# Patient Record
Sex: FEMALE | Race: WHITE | NOT HISPANIC OR LATINO | Employment: UNEMPLOYED | URBAN - METROPOLITAN AREA
[De-identification: names, ages, dates, MRNs, and addresses within clinical notes are randomized per-mention and may not be internally consistent; named-entity substitution may affect disease eponyms.]

---

## 2020-09-03 ENCOUNTER — LAB VISIT (OUTPATIENT)
Dept: LAB | Facility: HOSPITAL | Age: 30
End: 2020-09-03
Attending: FAMILY MEDICINE
Payer: OTHER GOVERNMENT

## 2020-09-03 ENCOUNTER — OFFICE VISIT (OUTPATIENT)
Dept: FAMILY MEDICINE | Facility: CLINIC | Age: 30
End: 2020-09-03
Payer: OTHER GOVERNMENT

## 2020-09-03 VITALS
DIASTOLIC BLOOD PRESSURE: 83 MMHG | BODY MASS INDEX: 35.37 KG/M2 | TEMPERATURE: 98 F | HEART RATE: 93 BPM | SYSTOLIC BLOOD PRESSURE: 112 MMHG | OXYGEN SATURATION: 97 % | RESPIRATION RATE: 15 BRPM | WEIGHT: 192.19 LBS | HEIGHT: 62 IN

## 2020-09-03 DIAGNOSIS — Z11.59 ENCOUNTER FOR HEPATITIS C SCREENING TEST FOR LOW RISK PATIENT: ICD-10-CM

## 2020-09-03 DIAGNOSIS — E03.9 HYPOTHYROIDISM, UNSPECIFIED TYPE: ICD-10-CM

## 2020-09-03 DIAGNOSIS — Z76.89 ENCOUNTER TO ESTABLISH CARE: Primary | ICD-10-CM

## 2020-09-03 DIAGNOSIS — F41.9 ANXIETY: ICD-10-CM

## 2020-09-03 DIAGNOSIS — R10.33 PERIUMBILICAL ABDOMINAL PAIN: ICD-10-CM

## 2020-09-03 DIAGNOSIS — Z13.1 SCREENING FOR DIABETES MELLITUS: ICD-10-CM

## 2020-09-03 DIAGNOSIS — Z13.220 SCREENING FOR LIPID DISORDERS: ICD-10-CM

## 2020-09-03 DIAGNOSIS — Z86.73 HISTORY OF STROKE: ICD-10-CM

## 2020-09-03 DIAGNOSIS — Z11.4 SCREENING FOR HIV (HUMAN IMMUNODEFICIENCY VIRUS): ICD-10-CM

## 2020-09-03 LAB
ALBUMIN SERPL BCP-MCNC: 4 G/DL (ref 3.5–5.2)
ALP SERPL-CCNC: 77 U/L (ref 55–135)
ALT SERPL W/O P-5'-P-CCNC: 29 U/L (ref 10–44)
ANION GAP SERPL CALC-SCNC: 11 MMOL/L (ref 8–16)
AST SERPL-CCNC: 26 U/L (ref 10–40)
BASOPHILS # BLD AUTO: 0.04 K/UL (ref 0–0.2)
BASOPHILS NFR BLD: 0.6 % (ref 0–1.9)
BILIRUB SERPL-MCNC: 0.7 MG/DL (ref 0.1–1)
BUN SERPL-MCNC: 11 MG/DL (ref 6–20)
CALCIUM SERPL-MCNC: 9.4 MG/DL (ref 8.7–10.5)
CHLORIDE SERPL-SCNC: 104 MMOL/L (ref 95–110)
CHOLEST SERPL-MCNC: 185 MG/DL (ref 120–199)
CHOLEST/HDLC SERPL: 3.1 {RATIO} (ref 2–5)
CO2 SERPL-SCNC: 25 MMOL/L (ref 23–29)
CREAT SERPL-MCNC: 0.9 MG/DL (ref 0.5–1.4)
DIFFERENTIAL METHOD: ABNORMAL
EOSINOPHIL # BLD AUTO: 0.2 K/UL (ref 0–0.5)
EOSINOPHIL NFR BLD: 3.2 % (ref 0–8)
ERYTHROCYTE [DISTWIDTH] IN BLOOD BY AUTOMATED COUNT: 14.7 % (ref 11.5–14.5)
EST. GFR  (AFRICAN AMERICAN): >60 ML/MIN/1.73 M^2
EST. GFR  (NON AFRICAN AMERICAN): >60 ML/MIN/1.73 M^2
ESTIMATED AVG GLUCOSE: 94 MG/DL (ref 68–131)
GLUCOSE SERPL-MCNC: 71 MG/DL (ref 70–110)
HBA1C MFR BLD HPLC: 4.9 % (ref 4.5–6.2)
HCT VFR BLD AUTO: 43.1 % (ref 37–48.5)
HDLC SERPL-MCNC: 59 MG/DL (ref 40–75)
HDLC SERPL: 31.9 % (ref 20–50)
HGB BLD-MCNC: 13.9 G/DL (ref 12–16)
IMM GRANULOCYTES # BLD AUTO: 0.02 K/UL (ref 0–0.04)
IMM GRANULOCYTES NFR BLD AUTO: 0.3 % (ref 0–0.5)
LDLC SERPL CALC-MCNC: 103.2 MG/DL (ref 63–159)
LYMPHOCYTES # BLD AUTO: 2.2 K/UL (ref 1–4.8)
LYMPHOCYTES NFR BLD: 32.7 % (ref 18–48)
MCH RBC QN AUTO: 27.4 PG (ref 27–31)
MCHC RBC AUTO-ENTMCNC: 32.3 G/DL (ref 32–36)
MCV RBC AUTO: 85 FL (ref 82–98)
MONOCYTES # BLD AUTO: 0.6 K/UL (ref 0.3–1)
MONOCYTES NFR BLD: 8.3 % (ref 4–15)
NEUTROPHILS # BLD AUTO: 3.6 K/UL (ref 1.8–7.7)
NEUTROPHILS NFR BLD: 54.9 % (ref 38–73)
NONHDLC SERPL-MCNC: 126 MG/DL
NRBC BLD-RTO: 0 /100 WBC
PLATELET # BLD AUTO: 197 K/UL (ref 150–350)
PMV BLD AUTO: 12.1 FL (ref 9.2–12.9)
POTASSIUM SERPL-SCNC: 3.5 MMOL/L (ref 3.5–5.1)
PROT SERPL-MCNC: 8.1 G/DL (ref 6–8.4)
RBC # BLD AUTO: 5.07 M/UL (ref 4–5.4)
SODIUM SERPL-SCNC: 140 MMOL/L (ref 136–145)
T4 FREE SERPL-MCNC: 0.71 NG/DL (ref 0.71–1.51)
TRIGL SERPL-MCNC: 114 MG/DL (ref 30–150)
TSH SERPL DL<=0.005 MIU/L-ACNC: 7.18 UIU/ML (ref 0.34–5.6)
WBC # BLD AUTO: 6.6 K/UL (ref 3.9–12.7)

## 2020-09-03 PROCEDURE — 86703 HIV-1/HIV-2 1 RESULT ANTBDY: CPT

## 2020-09-03 PROCEDURE — 99204 OFFICE O/P NEW MOD 45 MIN: CPT | Mod: S$GLB,,, | Performed by: FAMILY MEDICINE

## 2020-09-03 PROCEDURE — 80053 COMPREHEN METABOLIC PANEL: CPT

## 2020-09-03 PROCEDURE — 86803 HEPATITIS C AB TEST: CPT

## 2020-09-03 PROCEDURE — 84443 ASSAY THYROID STIM HORMONE: CPT

## 2020-09-03 PROCEDURE — 84439 ASSAY OF FREE THYROXINE: CPT

## 2020-09-03 PROCEDURE — 85025 COMPLETE CBC W/AUTO DIFF WBC: CPT

## 2020-09-03 PROCEDURE — 99204 PR OFFICE/OUTPT VISIT, NEW, LEVL IV, 45-59 MIN: ICD-10-PCS | Mod: S$GLB,,, | Performed by: FAMILY MEDICINE

## 2020-09-03 PROCEDURE — 80061 LIPID PANEL: CPT

## 2020-09-03 PROCEDURE — 83036 HEMOGLOBIN GLYCOSYLATED A1C: CPT

## 2020-09-03 PROCEDURE — 36415 COLL VENOUS BLD VENIPUNCTURE: CPT

## 2020-09-03 RX ORDER — CLONAZEPAM 1 MG/1
1 TABLET ORAL 2 TIMES DAILY PRN
COMMUNITY
End: 2020-09-03 | Stop reason: SDUPTHER

## 2020-09-03 RX ORDER — OMEPRAZOLE 40 MG/1
40 CAPSULE, DELAYED RELEASE ORAL DAILY
COMMUNITY
End: 2020-10-08

## 2020-09-03 RX ORDER — THYROID 30 MG/1
30 TABLET ORAL
COMMUNITY
End: 2020-09-08 | Stop reason: SDUPTHER

## 2020-09-03 RX ORDER — CLONAZEPAM 1 MG/1
1 TABLET ORAL 2 TIMES DAILY PRN
Qty: 60 TABLET | Refills: 0 | Status: SHIPPED | OUTPATIENT
Start: 2020-09-03 | End: 2020-10-08 | Stop reason: SDUPTHER

## 2020-09-03 NOTE — PROGRESS NOTES
[facility] - Clinic Note    Subjective      Ms. Gamble is a 30 y.o. female who presents to clinic for Establish Care (REQUESTING REFILLS)    Patient recently moved here from [facility].  She has a history of GERD, hypothyroidism, stroke.  Reports that she has had several strokes in her life.  The last stroke was in February of 2018.  There has been no reason elucidated for these strokes.  She is currently on Eliquis which has been the only medication that has been helping prevent her strokes.  With regard to her hypothyroidism that was diagnosed a year ago she is currently taking Keene Thyroid 30 mg daily.  She also has a history of anxiety for which she takes Klonopin 1 mg daily.  She feels like she may need a 2nd does throughout the day given the move an extra anxiety at this time.  She takes Prilosec for GERD which helps.  For the last 2 days she has noticed some pain in her umbilicus.  Reports that 2 days ago her  noted some purulent drainage.  Reports a history of chronic diarrhea for at least the last year    [facility] Dori has a past medical history of Depression, GERD (gastroesophageal reflux disease), Hypothyroidism, and Stroke.   PSX Dori has a past surgical history that includes Hysterectomy.    Dori's family history includes Arthritis in her maternal grandmother; Depression in her mother and sister; Heart disease in her father and mother; Kidney disease in her sister; Stroke in her mother.   LUISA Meadows reports that she has been smoking cigarettes. She has never used smokeless tobacco. She reports current alcohol use of about 2.0 standard drinks of alcohol per week. She reports previous drug use.   DAI Meadows is allergic to latex, natural rubber.   MAYANK Meadows has a current medication list which includes the following prescription(s): apixaban, clonazepam, omeprazole, and thyroid (pork).     Review of Systems   Constitutional: Negative for chills and fever.   HENT: Negative for  "congestion and rhinorrhea.    Eyes: Negative for visual disturbance.   Respiratory: Negative for cough and shortness of breath.    Cardiovascular: Negative for chest pain.   Gastrointestinal: Positive for abdominal pain and diarrhea. Negative for constipation, nausea and vomiting.   Genitourinary: Negative for dysuria.   Musculoskeletal: Negative for myalgias.   Skin: Negative for rash.   Neurological: Negative for weakness and headaches.     Objective     /83 (BP Location: Left arm, Patient Position: Sitting, BP Method: Large (Automatic))   Pulse 93   Temp 98.4 °F (36.9 °C) (Temporal)   Resp 15   Ht 5' 2" (1.575 m)   Wt 87.2 kg (192 lb 3.2 oz)   SpO2 97%   BMI 35.15 kg/m²     Physical Exam  Vitals signs and nursing note reviewed.   Constitutional:       General: She is not in acute distress.     Appearance: Normal appearance. She is well-developed. She is not diaphoretic.   HENT:      Head: Normocephalic and atraumatic.      Right Ear: External ear normal.      Left Ear: External ear normal.   Eyes:      General:         Right eye: No discharge.         Left eye: No discharge.   Cardiovascular:      Rate and Rhythm: Normal rate and regular rhythm.      Heart sounds: Normal heart sounds.   Pulmonary:      Effort: Pulmonary effort is normal.      Breath sounds: Normal breath sounds. No wheezing or rales.   Abdominal:      General: Abdomen is flat.      Palpations: Abdomen is soft.      Tenderness: There is no abdominal tenderness.      Comments: No drainage in the umbilicus.  Small abrasion, no surrounding erythema or warmth   Skin:     General: Skin is warm and dry.   Neurological:      Mental Status: She is alert and oriented to person, place, and time. Mental status is at baseline.   Psychiatric:         Mood and Affect: Mood normal.         Behavior: Behavior normal.         Thought Content: Thought content normal.         Judgment: Judgment normal.        Assessment/Plan     1. Encounter to " establish care     2. History of stroke  apixaban (ELIQUIS) 5 mg Tab   3. Anxiety  clonazePAM (KLONOPIN) 1 MG tablet   4. Hypothyroidism, unspecified type  T4, free    TSH   5. Periumbilical abdominal pain  Comprehensive metabolic panel    CBC auto differential   6. Encounter for hepatitis C screening test for low risk patient  Hepatitis C Antibody   7. Screening for HIV (human immunodeficiency virus)  HIV 1 / 2 ANTIBODY   8. Screening for lipid disorders  Lipid Panel   9. Screening for diabetes mellitus  Hemoglobin A1C     Will continue Eliquis for history of stroke.  Will obtain records from previous physician for review.  Given persistent anxiety will increase Klonopin to 1 mg twice a day as needed.   reviewed.  No red flags.  He given history of hypothyroidism and diarrhea will check thyroid function to assess if hypothyroidism is adequately controlled.  Will also check CMP and CBC.  Screening lab work as above.  Will continue to monitor umbilical pain.  If any drainage is noted then call back.  Follow-up in 1 month.    Future Appointments   Date Time Provider Department Center   10/8/2020  1:40 PM Mc Bernal MD [facility]         Mc Bernal MD  Family Medicine  [facility]

## 2020-09-04 LAB
HCV AB SERPL QL IA: NEGATIVE
HIV 1+2 AB+HIV1 P24 AG SERPL QL IA: NEGATIVE

## 2020-09-08 DIAGNOSIS — E03.9 HYPOTHYROIDISM, UNSPECIFIED TYPE: Primary | ICD-10-CM

## 2020-09-23 ENCOUNTER — PATIENT MESSAGE (OUTPATIENT)
Dept: FAMILY MEDICINE | Facility: CLINIC | Age: 30
End: 2020-09-23

## 2020-09-24 ENCOUNTER — PATIENT OUTREACH (OUTPATIENT)
Dept: ADMINISTRATIVE | Facility: HOSPITAL | Age: 30
End: 2020-09-24

## 2020-09-25 ENCOUNTER — NURSE TRIAGE (OUTPATIENT)
Dept: ADMINISTRATIVE | Facility: CLINIC | Age: 30
End: 2020-09-25

## 2020-09-25 ENCOUNTER — TELEPHONE (OUTPATIENT)
Dept: FAMILY MEDICINE | Facility: CLINIC | Age: 30
End: 2020-09-25

## 2020-09-25 ENCOUNTER — HOSPITAL ENCOUNTER (EMERGENCY)
Facility: HOSPITAL | Age: 30
Discharge: HOME | End: 2020-09-25
Attending: EMERGENCY MEDICINE
Payer: OTHER GOVERNMENT

## 2020-09-25 VITALS
WEIGHT: 192 LBS | RESPIRATION RATE: 19 BRPM | HEART RATE: 82 BPM | BODY MASS INDEX: 35.33 KG/M2 | DIASTOLIC BLOOD PRESSURE: 78 MMHG | SYSTOLIC BLOOD PRESSURE: 116 MMHG | HEIGHT: 62 IN | TEMPERATURE: 98 F | OXYGEN SATURATION: 97 %

## 2020-09-25 DIAGNOSIS — R05.9 COUGH: ICD-10-CM

## 2020-09-25 DIAGNOSIS — R07.9 CHEST PAIN: ICD-10-CM

## 2020-09-25 DIAGNOSIS — J40 BRONCHITIS: Primary | ICD-10-CM

## 2020-09-25 LAB — SARS-COV-2 RDRP RESP QL NAA+PROBE: NEGATIVE

## 2020-09-25 PROCEDURE — 71046 X-RAY EXAM CHEST 2 VIEWS: CPT | Mod: 26,,, | Performed by: RADIOLOGY

## 2020-09-25 PROCEDURE — 71046 XR CHEST PA AND LATERAL: ICD-10-PCS | Mod: 26,,, | Performed by: RADIOLOGY

## 2020-09-25 PROCEDURE — U0002 COVID-19 LAB TEST NON-CDC: HCPCS

## 2020-09-25 PROCEDURE — 93005 ELECTROCARDIOGRAM TRACING: CPT

## 2020-09-25 PROCEDURE — 99284 EMERGENCY DEPT VISIT MOD MDM: CPT | Mod: 25

## 2020-09-25 PROCEDURE — 71046 X-RAY EXAM CHEST 2 VIEWS: CPT | Mod: TC,FY

## 2020-09-25 RX ORDER — METHYLPREDNISOLONE 4 MG/1
TABLET ORAL
Qty: 1 PACKAGE | Refills: 0 | Status: SHIPPED | OUTPATIENT
Start: 2020-09-25 | End: 2020-10-08

## 2020-09-25 RX ORDER — AZITHROMYCIN 250 MG/1
250 TABLET, FILM COATED ORAL DAILY
Qty: 6 TABLET | Refills: 0 | Status: SHIPPED | OUTPATIENT
Start: 2020-09-25 | End: 2020-10-08

## 2020-09-25 RX ORDER — BENZONATATE 100 MG/1
100 CAPSULE ORAL 3 TIMES DAILY PRN
Qty: 20 CAPSULE | Refills: 0 | Status: SHIPPED | OUTPATIENT
Start: 2020-09-25 | End: 2020-10-05

## 2020-09-25 RX ORDER — GUAIFENESIN 600 MG/1
600 TABLET, EXTENDED RELEASE ORAL 2 TIMES DAILY
Qty: 20 TABLET | Refills: 0 | Status: SHIPPED | OUTPATIENT
Start: 2020-09-25 | End: 2020-10-05

## 2020-09-25 NOTE — TELEPHONE ENCOUNTER
Attempted to contact patient, unable to reach X1      ----- Message from Gracia Buchanan sent at 9/25/2020 10:14 AM CDT -----  Regarding: Pt advice  Contact: py  Type:  Patient Returning Call    Who Called:  pt  Who Left Message for Patient:  Ирина Lamb  Does the patient know what this is regarding?:  pt is returning call to office.  Best Call Back Number:    Additional Information:  Please follow up. Thank you

## 2020-09-25 NOTE — TELEPHONE ENCOUNTER
Cough and cold for a week and very fatigue. Nasal congestion. Pt stated she doesn't want to be tested for covid 19 because she will get it. Tightness in chest. Care advice recommend pt go to Er. Pt verbalized understanding.     Reason for Disposition   SEVERE or constant chest pain or pressure (Exception: mild central chest pain, present only when coughing)    Additional Information   Negative: Severe difficulty breathing (e.g., struggling for each breath, speaks in single words)   Negative: Difficult to awaken or acting confused (e.g., disoriented, slurred speech)   Negative: Bluish (or gray) lips or face now   Negative: Shock suspected (e.g., cold/pale/clammy skin, too weak to stand, low BP, rapid pulse)   Negative: Sounds like a life-threatening emergency to the triager    Protocols used: CORONAVIRUS (COVID-19) DIAGNOSED OR [id number]
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
## 2020-10-08 ENCOUNTER — OFFICE VISIT (OUTPATIENT)
Dept: FAMILY MEDICINE | Facility: CLINIC | Age: 30
End: 2020-10-08

## 2020-10-08 VITALS
WEIGHT: 191.19 LBS | OXYGEN SATURATION: 96 % | HEIGHT: 62 IN | HEART RATE: 88 BPM | SYSTOLIC BLOOD PRESSURE: 111 MMHG | RESPIRATION RATE: 14 BRPM | DIASTOLIC BLOOD PRESSURE: 74 MMHG | BODY MASS INDEX: 35.18 KG/M2 | TEMPERATURE: 98 F

## 2020-10-08 DIAGNOSIS — Z86.73 HISTORY OF STROKE: ICD-10-CM

## 2020-10-08 DIAGNOSIS — F41.9 ANXIETY: ICD-10-CM

## 2020-10-08 DIAGNOSIS — K21.9 GASTROESOPHAGEAL REFLUX DISEASE, UNSPECIFIED WHETHER ESOPHAGITIS PRESENT: Primary | ICD-10-CM

## 2020-10-08 DIAGNOSIS — E03.9 HYPOTHYROIDISM, UNSPECIFIED TYPE: ICD-10-CM

## 2020-10-08 DIAGNOSIS — F17.210 CIGARETTE NICOTINE DEPENDENCE WITHOUT COMPLICATION: ICD-10-CM

## 2020-10-08 PROCEDURE — 99214 PR OFFICE/OUTPT VISIT, EST, LEVL IV, 30-39 MIN: ICD-10-PCS | Mod: S$GLB,,, | Performed by: FAMILY MEDICINE

## 2020-10-08 PROCEDURE — 99214 OFFICE O/P EST MOD 30 MIN: CPT | Mod: S$GLB,,, | Performed by: FAMILY MEDICINE

## 2020-10-08 RX ORDER — FAMOTIDINE 20 MG/1
20 TABLET, FILM COATED ORAL 2 TIMES DAILY PRN
Qty: 60 TABLET | Refills: 11 | Status: SHIPPED | OUTPATIENT
Start: 2020-10-08 | End: 2021-05-13

## 2020-10-08 RX ORDER — ATORVASTATIN CALCIUM 20 MG/1
20 TABLET, FILM COATED ORAL DAILY
Qty: 90 TABLET | Refills: 3 | Status: SHIPPED | OUTPATIENT
Start: 2020-10-08 | End: 2021-05-13 | Stop reason: SDUPTHER

## 2020-10-08 RX ORDER — CLONAZEPAM 1 MG/1
1 TABLET ORAL 2 TIMES DAILY PRN
Qty: 60 TABLET | Refills: 0 | Status: SHIPPED | OUTPATIENT
Start: 2020-10-08 | End: 2021-05-13

## 2020-10-08 NOTE — PROGRESS NOTES
"[facility] - Clinic Note    Subjective      Ms. Gamble is a 30 y.o. female who presents to clinic for Follow-up    Patient reports that she has been feeling well overall.  The Klonopin is helping control her anxiety.  With regard to acid reflux she was previously on omeprazole.  She has never tried Pepcid.  She has been on omeprazole for 5 years.  She currently smokes.  History of strokes on Eliquis.  With regard to her thyroid she states that she was not taking her medication as prescribed and she is taking as prescribed now.    PM Dori has a past medical history of Depression, GERD (gastroesophageal reflux disease), Hypothyroidism, and Stroke.   PSXH Dori has a past surgical history that includes Hysterectomy.    Dori's family history includes Arthritis in her maternal grandmother; Depression in her mother and sister; Heart disease in her father and mother; Kidney disease in her sister; Stroke in her mother.   LUISA Meadows reports that she has been smoking cigarettes. She has never used smokeless tobacco. She reports current alcohol use of about 2.0 standard drinks of alcohol per week. She reports previous drug use.   DAI Meadows is allergic to latex, natural rubber.   MAYANK Meadows has a current medication list which includes the following prescription(s): clonazepam, omeprazole, thyroid (pork), apixaban, atorvastatin, and famotidine.     Review of Systems   Constitutional: Negative for chills and fever.   Respiratory: Negative for shortness of breath.    Cardiovascular: Negative for chest pain.     Objective     /74 (BP Location: Left arm, Patient Position: Sitting, BP Method: Large (Automatic))   Pulse 88   Temp 97.5 °F (36.4 °C) (Temporal)   Resp 14   Ht 5' 2" (1.575 m)   Wt 86.7 kg (191 lb 3.2 oz)   SpO2 96%   BMI 34.97 kg/m²     Physical Exam  Vitals signs and nursing note reviewed.   Constitutional:       General: She is not in acute distress.     Appearance: Normal appearance. " She is well-developed. She is not diaphoretic.   HENT:      Head: Normocephalic and atraumatic.      Right Ear: External ear normal.      Left Ear: External ear normal.   Eyes:      General:         Right eye: No discharge.         Left eye: No discharge.   Cardiovascular:      Rate and Rhythm: Normal rate and regular rhythm.      Heart sounds: Normal heart sounds.   Pulmonary:      Effort: Pulmonary effort is normal.      Breath sounds: Normal breath sounds. No wheezing or rales.   Skin:     General: Skin is warm and dry.   Neurological:      Mental Status: She is alert and oriented to person, place, and time. Mental status is at baseline.   Psychiatric:         Mood and Affect: Mood normal.         Behavior: Behavior normal.         Thought Content: Thought content normal.         Judgment: Judgment normal.        Assessment/Plan     1. Gastroesophageal reflux disease, unspecified whether esophagitis present  famotidine (PEPCID) 20 MG tablet   2. Cigarette nicotine dependence without complication  Ambulatory referral/consult to Smoking Cessation Program   3. Hypothyroidism, unspecified type  TSH   4. History of stroke  atorvastatin (LIPITOR) 20 MG tablet   5. Anxiety  [facility] Drug Screen     Will trial Pepcid instead of omeprazole as needed for GERD.  Referral placed to smoking cessation program.  Given history of strokes it would be beneficial for patient to quit smoking.  Will add Lipitor for secondary stroke prevention.  Continue Eliquis.  Will check TSH in 1 month.  Continue Klonopin for anxiety is is providing relief for patient.  Follow-up in 3 months.  Patient declines vaccinations at this time.    No future appointments.      Mc Bernal MD  Family Medicine  [facility]

## 2020-12-02 ENCOUNTER — HOSPITAL ENCOUNTER (EMERGENCY)
Facility: HOSPITAL | Age: 30
Discharge: HOME | End: 2020-12-02
Attending: INTERNAL MEDICINE

## 2020-12-02 VITALS
RESPIRATION RATE: 18 BRPM | TEMPERATURE: 98 F | OXYGEN SATURATION: 100 % | WEIGHT: 197 LBS | BODY MASS INDEX: 36.25 KG/M2 | DIASTOLIC BLOOD PRESSURE: 91 MMHG | SYSTOLIC BLOOD PRESSURE: 135 MMHG | HEIGHT: 62 IN | HEART RATE: 94 BPM

## 2020-12-02 DIAGNOSIS — G43.011 INTRACTABLE MIGRAINE WITHOUT AURA AND WITH STATUS MIGRAINOSUS: ICD-10-CM

## 2020-12-02 DIAGNOSIS — M94.0 COSTOCHONDRITIS, ACUTE: Primary | ICD-10-CM

## 2020-12-02 DIAGNOSIS — R07.9 CHEST PAIN: ICD-10-CM

## 2020-12-02 LAB
ALBUMIN SERPL BCP-MCNC: 3.9 G/DL (ref 3.5–5.2)
ALP SERPL-CCNC: 88 U/L (ref 55–135)
ALT SERPL W/O P-5'-P-CCNC: 45 U/L (ref 10–44)
ANION GAP SERPL CALC-SCNC: 7 MMOL/L (ref 8–16)
AST SERPL-CCNC: 34 U/L (ref 10–40)
BASOPHILS # BLD AUTO: 0.06 K/UL (ref 0–0.2)
BASOPHILS NFR BLD: 0.8 % (ref 0–1.9)
BILIRUB SERPL-MCNC: 0.6 MG/DL (ref 0.1–1)
BUN SERPL-MCNC: 11 MG/DL (ref 6–20)
CALCIUM SERPL-MCNC: 9.1 MG/DL (ref 8.7–10.5)
CHLORIDE SERPL-SCNC: 105 MMOL/L (ref 95–110)
CO2 SERPL-SCNC: 25 MMOL/L (ref 23–29)
CREAT SERPL-MCNC: 0.9 MG/DL (ref 0.5–1.4)
CRP SERPL-MCNC: 0.63 MG/DL (ref 0–0.75)
D DIMER PPP IA.FEU-MCNC: 0.29 MG/L FEU
DIFFERENTIAL METHOD: NORMAL
EOSINOPHIL # BLD AUTO: 0.2 K/UL (ref 0–0.5)
EOSINOPHIL NFR BLD: 2.7 % (ref 0–8)
ERYTHROCYTE [DISTWIDTH] IN BLOOD BY AUTOMATED COUNT: 13.1 % (ref 11.5–14.5)
EST. GFR  (AFRICAN AMERICAN): >60 ML/MIN/1.73 M^2
EST. GFR  (NON AFRICAN AMERICAN): >60 ML/MIN/1.73 M^2
GLUCOSE SERPL-MCNC: 81 MG/DL (ref 70–110)
HCT VFR BLD AUTO: 43.9 % (ref 37–48.5)
HGB BLD-MCNC: 14.2 G/DL (ref 12–16)
IMM GRANULOCYTES # BLD AUTO: 0.03 K/UL (ref 0–0.04)
IMM GRANULOCYTES NFR BLD AUTO: 0.4 % (ref 0–0.5)
IRON SERPL-MCNC: 61 UG/DL (ref 30–160)
LYMPHOCYTES # BLD AUTO: 2.5 K/UL (ref 1–4.8)
LYMPHOCYTES NFR BLD: 31.1 % (ref 18–48)
MCH RBC QN AUTO: 27.6 PG (ref 27–31)
MCHC RBC AUTO-ENTMCNC: 32.3 G/DL (ref 32–36)
MCV RBC AUTO: 85 FL (ref 82–98)
MONOCYTES # BLD AUTO: 0.5 K/UL (ref 0.3–1)
MONOCYTES NFR BLD: 6.3 % (ref 4–15)
NEUTROPHILS # BLD AUTO: 4.7 K/UL (ref 1.8–7.7)
NEUTROPHILS NFR BLD: 58.7 % (ref 38–73)
NRBC BLD-RTO: 0 /100 WBC
PLATELET # BLD AUTO: 228 K/UL (ref 150–350)
PMV BLD AUTO: 11.6 FL (ref 9.2–12.9)
POTASSIUM SERPL-SCNC: 4.1 MMOL/L (ref 3.5–5.1)
PROT SERPL-MCNC: 7.3 G/DL (ref 6–8.4)
RBC # BLD AUTO: 5.15 M/UL (ref 4–5.4)
SATURATED IRON: 14 % (ref 20–50)
SODIUM SERPL-SCNC: 137 MMOL/L (ref 136–145)
TOTAL IRON BINDING CAPACITY: 435 UG/DL (ref 250–450)
TRANSFERRIN SERPL-MCNC: 294 MG/DL (ref 200–375)
TROPONIN I SERPL DL<=0.01 NG/ML-MCNC: <0.01 NG/ML (ref 0.02–0.5)
WBC # BLD AUTO: 7.91 K/UL (ref 3.9–12.7)

## 2020-12-02 PROCEDURE — 84484 ASSAY OF TROPONIN QUANT: CPT

## 2020-12-02 PROCEDURE — 36415 COLL VENOUS BLD VENIPUNCTURE: CPT

## 2020-12-02 PROCEDURE — 85379 FIBRIN DEGRADATION QUANT: CPT

## 2020-12-02 PROCEDURE — 96372 THER/PROPH/DIAG INJ SC/IM: CPT | Mod: 59

## 2020-12-02 PROCEDURE — 93005 ELECTROCARDIOGRAM TRACING: CPT

## 2020-12-02 PROCEDURE — 99285 EMERGENCY DEPT VISIT HI MDM: CPT | Mod: 25

## 2020-12-02 PROCEDURE — 63600175 PHARM REV CODE 636 W HCPCS: Performed by: INTERNAL MEDICINE

## 2020-12-02 PROCEDURE — 83540 ASSAY OF IRON: CPT

## 2020-12-02 PROCEDURE — 93010 ELECTROCARDIOGRAM REPORT: CPT | Mod: ,,, | Performed by: INTERNAL MEDICINE

## 2020-12-02 PROCEDURE — 80053 COMPREHEN METABOLIC PANEL: CPT

## 2020-12-02 PROCEDURE — 85025 COMPLETE CBC W/AUTO DIFF WBC: CPT

## 2020-12-02 PROCEDURE — 71046 X-RAY EXAM CHEST 2 VIEWS: CPT | Mod: 26,,, | Performed by: RADIOLOGY

## 2020-12-02 PROCEDURE — 71046 X-RAY EXAM CHEST 2 VIEWS: CPT | Mod: TC,FY

## 2020-12-02 PROCEDURE — 86140 C-REACTIVE PROTEIN: CPT

## 2020-12-02 PROCEDURE — 71046 XR CHEST PA AND LATERAL: ICD-10-PCS | Mod: 26,,, | Performed by: RADIOLOGY

## 2020-12-02 PROCEDURE — 93010 EKG 12-LEAD: ICD-10-PCS | Mod: ,,, | Performed by: INTERNAL MEDICINE

## 2020-12-02 RX ORDER — PREDNISONE 10 MG/1
60 TABLET ORAL
Status: COMPLETED | OUTPATIENT
Start: 2020-12-02 | End: 2020-12-02

## 2020-12-02 RX ORDER — DEXAMETHASONE SODIUM PHOSPHATE 10 MG/ML
10 INJECTION INTRAMUSCULAR; INTRAVENOUS
Status: COMPLETED | OUTPATIENT
Start: 2020-12-02 | End: 2020-12-02

## 2020-12-02 RX ORDER — KETOROLAC TROMETHAMINE 30 MG/ML
30 INJECTION, SOLUTION INTRAMUSCULAR; INTRAVENOUS
Status: COMPLETED | OUTPATIENT
Start: 2020-12-02 | End: 2020-12-02

## 2020-12-02 RX ORDER — ATENOLOL 50 MG/1
50 TABLET ORAL DAILY
Qty: 30 TABLET | Refills: 11 | Status: SHIPPED | OUTPATIENT
Start: 2020-12-02 | End: 2022-04-18 | Stop reason: SDUPTHER

## 2020-12-02 RX ORDER — ATENOLOL 50 MG/1
50 TABLET ORAL DAILY
Qty: 30 TABLET | Refills: 11 | Status: SHIPPED | OUTPATIENT
Start: 2020-12-02 | End: 2021-05-13 | Stop reason: SDUPTHER

## 2020-12-02 RX ADMIN — PREDNISONE 60 MG: 10 TABLET ORAL at 08:12

## 2020-12-02 RX ADMIN — KETOROLAC TROMETHAMINE 30 MG: 30 INJECTION, SOLUTION INTRAMUSCULAR at 10:12

## 2020-12-02 RX ADMIN — DEXAMETHASONE SODIUM PHOSPHATE 10 MG: 10 INJECTION, SOLUTION INTRAMUSCULAR; INTRAVENOUS at 10:12

## 2020-12-02 NOTE — ED TRIAGE NOTES
Pt having gi issues, epigastric pain/chest pain for months, unable to get rx filled or get follow up here for eval

## 2020-12-02 NOTE — ED PROVIDER NOTES
Encounter Date: 12/2/2020       History     Chief Complaint   Patient presents with    gastric reflux     Patient comes in with 1 month history of anterior chest pain.  The pain is pleuritic.  It is worse when she laughs and coughs.  She is exquisitely tender along the costal manubrial joints.  She has no other joint stiffness.    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  She has a history of migraine.  Is not being treated her with prophylaxis.  She is on Eliquis for stroke prevention.  She was not found to have a clotting disorder.  The 2nd stroke cause numbness in her lower legs.  These are all characteristic of migraine induced stroke, with without a underlying clotting disorder such as factor 5 Leiden or some other genetic predisposition.    Patient was seen here for bronchitis and September with negative testing including COVID.  She has no other symptoms of COVID currently.    No fever chills sweats no cough a bronchitis no myalgias.    The patient was diagnosed with mild hypothyroidism but states she cannot afford thyroid medication.  TSH was 7.2, which is borderline necessitating treatment.    Patient has had a total hysterectomy for excessive bleeding.        Review of patient's allergies indicates:   Allergen Reactions    Latex, natural rubber Rash     Past Medical History:   Diagnosis Date    Depression     GERD (gastroesophageal reflux disease)     Hypothyroidism     Migraine headache     Stroke      Past Surgical History:   Procedure Laterality Date    HYSTERECTOMY       Family History   Problem Relation Age of Onset    Depression Mother     Heart disease Mother     Stroke Mother     Heart disease Father     Kidney disease Sister     Depression Sister     Arthritis Maternal Grandmother      Social History     Tobacco Use    Smoking status: Current Every Day Smoker     Types: Cigarettes    Smokeless tobacco: Never Used    Substance Use Topics    Alcohol use: Yes     Alcohol/week: 2.0 standard drinks     Types: 2 Cans of beer per week     Frequency: 4 or more times a week     Drinks per session: 1 or 2     Binge frequency: Less than monthly    Drug use: Not Currently     Review of Systems   Constitutional: Negative for fever.   HENT: Negative for sore throat.    Respiratory: Negative for shortness of breath.    Cardiovascular: Positive for chest pain.   Gastrointestinal: Negative for nausea.   Genitourinary: Negative for dysuria.   Musculoskeletal: Negative for back pain.   Skin: Negative for rash.   Neurological: Negative for weakness.   Hematological: Does not bruise/bleed easily.   All other systems reviewed and are negative.      Physical Exam     Initial Vitals [12/02/20 0800]   BP Pulse Resp Temp SpO2   (!) 135/91 94 18 97.6 °F (36.4 °C) 100 %      MAP       --         Physical Exam    Nursing note and vitals reviewed.  Constitutional: Vital signs are normal. She appears well-developed and well-nourished. She is active and cooperative.   HENT:   Head: Normocephalic and atraumatic.   Eyes: Conjunctivae and lids are normal. Lids are everted and swept, no foreign bodies found.   Neck: Trachea normal, normal range of motion and full passive range of motion without pain. Neck supple.   Cardiovascular: Normal rate, regular rhythm, S1 normal, S2 normal, normal heart sounds, intact distal pulses and normal pulses.  No extrasystoles are present.    Pulmonary/Chest: Breath sounds normal.   Exquisite tenderness on the costal manubrial joints as well as the left sternoclavicular joint.  There is no swelling clearly did detected.  No other joints are swollen or tender including peripheral joints   Abdominal: Soft. Normal appearance and bowel sounds are normal.   Musculoskeletal: Normal range of motion.   Neurological: She is alert. She has normal reflexes. GCS eye subscore is 4. GCS verbal subscore is 5. GCS motor subscore is 6.   Skin:  Skin is warm, dry and intact. Capillary refill takes less than 2 seconds.   Psychiatric: She has a normal mood and affect. Her speech is normal and behavior is normal. Cognition and memory are normal.         ED Course   Procedures  Labs Reviewed   CBC W/ AUTO DIFFERENTIAL   COMPREHENSIVE METABOLIC PANEL   C-REACTIVE PROTEIN   D DIMER, QUANTITATIVE   TROPONIN I     EKG Readings: (Independently Interpreted)   Initial Reading: No STEMI. Rhythm: Normal Sinus Rhythm. Ectopy: No Ectopy. Conduction: Normal. ST Segments: Normal ST Segments. T Waves: Normal. Axis: Normal. Clinical Impression: Normal Sinus Rhythm       Imaging Results    None       X-Rays:   Independently Interpreted Readings:   Chest X-Ray: Normal heart size.  No infiltrates.  No acute abnormalities. Normal vessels.                                 Clinical Impression:       ICD-10-CM ICD-9-CM   1. Costochondritis, acute  M94.0 733.6   2. Chest pain  R07.9 786.50                      Disposition:   Disposition: Discharged  Condition: Stable                          Vinnie Lorenzana MD  12/02/20 [id number]

## 2020-12-02 NOTE — DISCHARGE INSTRUCTIONS
1.  Headache free multivitamins 2 tablets once a day for headache prevention, find on [employer] dot com    2.  Atenolol 50 mg daily for headache prevention    3.  Search the Eliquis website for patient's support and financial assistance    4.  After achieving medical care, consider bone density testing due to total hysterectomy    5.

## 2020-12-02 NOTE — Clinical Note
"Dori"Chas Gamble was seen and treated in our emergency department on 12/2/2020.  She may return to work on 12/03/2020.       If you have any questions or concerns, please don't hesitate to call.      pawan carmona RN    "

## 2020-12-09 ENCOUNTER — PATIENT MESSAGE (OUTPATIENT)
Dept: FAMILY MEDICINE | Facility: CLINIC | Age: 30
End: 2020-12-09

## 2021-01-08 ENCOUNTER — PATIENT MESSAGE (OUTPATIENT)
Dept: FAMILY MEDICINE | Facility: CLINIC | Age: 31
End: 2021-01-08

## 2021-02-03 ENCOUNTER — HOSPITAL ENCOUNTER (EMERGENCY)
Facility: HOSPITAL | Age: 31
Discharge: HOME | End: 2021-02-03
Attending: EMERGENCY MEDICINE
Payer: OTHER GOVERNMENT

## 2021-02-03 VITALS
DIASTOLIC BLOOD PRESSURE: 70 MMHG | TEMPERATURE: 98 F | WEIGHT: 200 LBS | HEART RATE: 89 BPM | HEIGHT: 63 IN | OXYGEN SATURATION: 98 % | BODY MASS INDEX: 35.44 KG/M2 | RESPIRATION RATE: 20 BRPM | SYSTOLIC BLOOD PRESSURE: 96 MMHG

## 2021-02-03 DIAGNOSIS — Z20.822 COVID-19 VIRUS RNA NOT DETECTED: ICD-10-CM

## 2021-02-03 DIAGNOSIS — B34.9 VIRAL SYNDROME: Primary | ICD-10-CM

## 2021-02-03 DIAGNOSIS — Z71.89 ADVICE GIVEN ABOUT COVID-19 VIRUS INFECTION: ICD-10-CM

## 2021-02-03 LAB — SARS-COV-2 RDRP RESP QL NAA+PROBE: NEGATIVE

## 2021-02-03 PROCEDURE — U0002 COVID-19 LAB TEST NON-CDC: HCPCS

## 2021-02-03 PROCEDURE — 99281 EMR DPT VST MAYX REQ PHY/QHP: CPT

## 2021-04-26 ENCOUNTER — HOSPITAL ENCOUNTER (EMERGENCY)
Facility: HOSPITAL | Age: 31
Discharge: HOME | End: 2021-04-26
Attending: EMERGENCY MEDICINE

## 2021-04-26 VITALS
TEMPERATURE: 98 F | OXYGEN SATURATION: 96 % | DIASTOLIC BLOOD PRESSURE: 91 MMHG | HEIGHT: 63 IN | WEIGHT: 187 LBS | BODY MASS INDEX: 33.13 KG/M2 | RESPIRATION RATE: 18 BRPM | SYSTOLIC BLOOD PRESSURE: 128 MMHG | HEART RATE: 96 BPM

## 2021-04-26 DIAGNOSIS — B02.9 HERPES ZOSTER WITHOUT COMPLICATION: Primary | ICD-10-CM

## 2021-04-26 PROCEDURE — 99284 EMERGENCY DEPT VISIT MOD MDM: CPT

## 2021-04-26 RX ORDER — ACYCLOVIR 800 MG/1
800 TABLET ORAL
Qty: 40 TABLET | Refills: 0 | Status: SHIPPED | OUTPATIENT
Start: 2021-04-26 | End: 2021-05-13

## 2021-04-26 RX ORDER — GABAPENTIN 100 MG/1
100 CAPSULE ORAL 2 TIMES DAILY
Qty: 14 CAPSULE | Refills: 11 | Status: SHIPPED | OUTPATIENT
Start: 2021-04-26 | End: 2021-05-13

## 2021-05-09 ENCOUNTER — PATIENT MESSAGE (OUTPATIENT)
Dept: FAMILY MEDICINE | Facility: CLINIC | Age: 31
End: 2021-05-09

## 2021-05-13 ENCOUNTER — TELEPHONE (OUTPATIENT)
Dept: FAMILY MEDICINE | Facility: CLINIC | Age: 31
End: 2021-05-13

## 2021-05-13 ENCOUNTER — OFFICE VISIT (OUTPATIENT)
Dept: FAMILY MEDICINE | Facility: CLINIC | Age: 31
End: 2021-05-13
Payer: COMMERCIAL

## 2021-05-13 VITALS
OXYGEN SATURATION: 99 % | WEIGHT: 195.13 LBS | HEART RATE: 82 BPM | RESPIRATION RATE: 17 BRPM | DIASTOLIC BLOOD PRESSURE: 78 MMHG | SYSTOLIC BLOOD PRESSURE: 104 MMHG | BODY MASS INDEX: 34.57 KG/M2 | TEMPERATURE: 98 F | HEIGHT: 63 IN

## 2021-05-13 DIAGNOSIS — Z86.73 HISTORY OF STROKE: ICD-10-CM

## 2021-05-13 DIAGNOSIS — G47.9 SLEEP DISTURBANCE: Primary | ICD-10-CM

## 2021-05-13 DIAGNOSIS — H92.03 OTALGIA OF BOTH EARS: Primary | ICD-10-CM

## 2021-05-13 DIAGNOSIS — H92.03 OTALGIA OF BOTH EARS: ICD-10-CM

## 2021-05-13 PROCEDURE — 99214 OFFICE O/P EST MOD 30 MIN: CPT | Mod: S$GLB,,, | Performed by: FAMILY MEDICINE

## 2021-05-13 PROCEDURE — 3008F PR BODY MASS INDEX (BMI) DOCUMENTED: ICD-10-PCS | Mod: S$GLB,,, | Performed by: FAMILY MEDICINE

## 2021-05-13 PROCEDURE — 1125F PR PAIN SEVERITY QUANTIFIED, PAIN PRESENT: ICD-10-PCS | Mod: S$GLB,,, | Performed by: FAMILY MEDICINE

## 2021-05-13 PROCEDURE — 3008F BODY MASS INDEX DOCD: CPT | Mod: S$GLB,,, | Performed by: FAMILY MEDICINE

## 2021-05-13 PROCEDURE — 1125F AMNT PAIN NOTED PAIN PRSNT: CPT | Mod: S$GLB,,, | Performed by: FAMILY MEDICINE

## 2021-05-13 PROCEDURE — 99214 PR OFFICE/OUTPT VISIT, EST, LEVL IV, 30-39 MIN: ICD-10-PCS | Mod: S$GLB,,, | Performed by: FAMILY MEDICINE

## 2021-05-13 RX ORDER — HYDROCORTISONE AND ACETIC ACID 20.75; 10.375 MG/ML; MG/ML
3 SOLUTION AURICULAR (OTIC) 3 TIMES DAILY
Qty: 10 ML | Refills: 0 | Status: SHIPPED | OUTPATIENT
Start: 2021-05-13 | End: 2021-05-23

## 2021-05-13 RX ORDER — TRAZODONE HYDROCHLORIDE 100 MG/1
100 TABLET ORAL NIGHTLY
Qty: 30 TABLET | Refills: 3 | Status: SHIPPED | OUTPATIENT
Start: 2021-05-13 | End: 2021-09-24

## 2021-05-13 RX ORDER — ATORVASTATIN CALCIUM 20 MG/1
20 TABLET, FILM COATED ORAL DAILY
Qty: 90 TABLET | Refills: 3 | Status: SHIPPED | OUTPATIENT
Start: 2021-05-13 | End: 2021-11-09 | Stop reason: SDUPTHER

## 2021-05-13 RX ORDER — PREDNISOLONE ACETATE 10 MG/ML
SUSPENSION/ DROPS OPHTHALMIC
Qty: 5 ML | Refills: 0 | Status: SHIPPED | OUTPATIENT
Start: 2021-05-13 | End: 2021-05-25

## 2021-05-13 RX ORDER — OMEPRAZOLE 10 MG/1
10 CAPSULE, DELAYED RELEASE ORAL DAILY
COMMUNITY
End: 2021-11-01 | Stop reason: SDUPTHER

## 2021-05-21 ENCOUNTER — PATIENT MESSAGE (OUTPATIENT)
Dept: FAMILY MEDICINE | Facility: CLINIC | Age: 31
End: 2021-05-21

## 2021-05-21 DIAGNOSIS — E03.9 HYPOTHYROIDISM, UNSPECIFIED TYPE: ICD-10-CM

## 2021-05-25 ENCOUNTER — PATIENT MESSAGE (OUTPATIENT)
Dept: FAMILY MEDICINE | Facility: CLINIC | Age: 31
End: 2021-05-25

## 2021-05-25 ENCOUNTER — HOSPITAL ENCOUNTER (EMERGENCY)
Facility: HOSPITAL | Age: 31
Discharge: HOME | End: 2021-05-25
Attending: EMERGENCY MEDICINE
Payer: COMMERCIAL

## 2021-05-25 VITALS
BODY MASS INDEX: 35.44 KG/M2 | SYSTOLIC BLOOD PRESSURE: 116 MMHG | TEMPERATURE: 98 F | RESPIRATION RATE: 19 BRPM | DIASTOLIC BLOOD PRESSURE: 79 MMHG | HEART RATE: 82 BPM | HEIGHT: 63 IN | WEIGHT: 200 LBS | OXYGEN SATURATION: 98 %

## 2021-05-25 DIAGNOSIS — E03.9 HYPOTHYROIDISM, UNSPECIFIED TYPE: Primary | ICD-10-CM

## 2021-05-25 DIAGNOSIS — S81.812A LACERATION OF LEFT LOWER EXTREMITY, INITIAL ENCOUNTER: Primary | ICD-10-CM

## 2021-05-25 PROCEDURE — 63600175 PHARM REV CODE 636 W HCPCS: Performed by: NURSE PRACTITIONER

## 2021-05-25 PROCEDURE — 99284 EMERGENCY DEPT VISIT MOD MDM: CPT | Mod: 25

## 2021-05-25 PROCEDURE — 90471 IMMUNIZATION ADMIN: CPT | Performed by: NURSE PRACTITIONER

## 2021-05-25 PROCEDURE — 90714 TD VACC NO PRESV 7 YRS+ IM: CPT | Performed by: NURSE PRACTITIONER

## 2021-05-25 PROCEDURE — 25000003 PHARM REV CODE 250: Performed by: NURSE PRACTITIONER

## 2021-05-25 PROCEDURE — 12002 RPR S/N/AX/GEN/TRNK2.6-7.5CM: CPT

## 2021-05-25 RX ORDER — BACITRACIN 500 [USP'U]/G
OINTMENT TOPICAL
Status: COMPLETED | OUTPATIENT
Start: 2021-05-25 | End: 2021-05-25

## 2021-05-25 RX ORDER — LIDOCAINE HYDROCHLORIDE 10 MG/ML
5 INJECTION, SOLUTION EPIDURAL; INFILTRATION; INTRACAUDAL; PERINEURAL
Status: COMPLETED | OUTPATIENT
Start: 2021-05-25 | End: 2021-05-25

## 2021-05-25 RX ADMIN — LIDOCAINE HYDROCHLORIDE 50 MG: 10 INJECTION, SOLUTION EPIDURAL; INFILTRATION; INTRACAUDAL; PERINEURAL at 07:05

## 2021-05-25 RX ADMIN — BACITRACIN: 500 OINTMENT TOPICAL at 08:05

## 2021-05-25 RX ADMIN — TETANUS AND DIPHTHERIA TOXOIDS ADSORBED 0.5 ML: 2; 2 INJECTION INTRAMUSCULAR at 07:05

## 2021-05-26 ENCOUNTER — PATIENT MESSAGE (OUTPATIENT)
Dept: FAMILY MEDICINE | Facility: CLINIC | Age: 31
End: 2021-05-26

## 2021-05-26 RX ORDER — THYROID 30 MG/1
30 TABLET ORAL
Qty: 30 TABLET | Refills: 11 | Status: SHIPPED | OUTPATIENT
Start: 2021-05-26 | End: 2021-06-10

## 2021-05-27 ENCOUNTER — PATIENT MESSAGE (OUTPATIENT)
Dept: FAMILY MEDICINE | Facility: CLINIC | Age: 31
End: 2021-05-27

## 2021-06-10 ENCOUNTER — OFFICE VISIT (OUTPATIENT)
Dept: FAMILY MEDICINE | Facility: CLINIC | Age: 31
End: 2021-06-10
Payer: COMMERCIAL

## 2021-06-10 VITALS
BODY MASS INDEX: 35.35 KG/M2 | HEIGHT: 63 IN | HEART RATE: 76 BPM | OXYGEN SATURATION: 97 % | SYSTOLIC BLOOD PRESSURE: 120 MMHG | DIASTOLIC BLOOD PRESSURE: 74 MMHG | RESPIRATION RATE: 17 BRPM | WEIGHT: 199.5 LBS

## 2021-06-10 DIAGNOSIS — E03.9 HYPOTHYROIDISM, UNSPECIFIED TYPE: ICD-10-CM

## 2021-06-10 DIAGNOSIS — G47.9 SLEEP DISTURBANCE: Primary | ICD-10-CM

## 2021-06-10 PROCEDURE — 3008F BODY MASS INDEX DOCD: CPT | Mod: S$GLB,,, | Performed by: FAMILY MEDICINE

## 2021-06-10 PROCEDURE — 99214 OFFICE O/P EST MOD 30 MIN: CPT | Mod: S$GLB,,, | Performed by: FAMILY MEDICINE

## 2021-06-10 PROCEDURE — 3008F PR BODY MASS INDEX (BMI) DOCUMENTED: ICD-10-PCS | Mod: S$GLB,,, | Performed by: FAMILY MEDICINE

## 2021-06-10 PROCEDURE — 1126F AMNT PAIN NOTED NONE PRSNT: CPT | Mod: S$GLB,,, | Performed by: FAMILY MEDICINE

## 2021-06-10 PROCEDURE — 99214 PR OFFICE/OUTPT VISIT, EST, LEVL IV, 30-39 MIN: ICD-10-PCS | Mod: S$GLB,,, | Performed by: FAMILY MEDICINE

## 2021-06-10 PROCEDURE — 1126F PR PAIN SEVERITY QUANTIFIED, NO PAIN PRESENT: ICD-10-PCS | Mod: S$GLB,,, | Performed by: FAMILY MEDICINE

## 2021-06-10 RX ORDER — THYROID 15 MG/1
30 TABLET ORAL
Qty: 60 TABLET | Refills: 11 | Status: SHIPPED | OUTPATIENT
Start: 2021-06-10 | End: 2021-11-09 | Stop reason: SDUPTHER

## 2021-06-21 ENCOUNTER — HOSPITAL ENCOUNTER (EMERGENCY)
Facility: HOSPITAL | Age: 31
Discharge: HOME | End: 2021-06-22
Attending: FAMILY MEDICINE
Payer: OTHER MISCELLANEOUS

## 2021-06-21 ENCOUNTER — NURSE TRIAGE (OUTPATIENT)
Dept: ADMINISTRATIVE | Facility: CLINIC | Age: 31
End: 2021-06-21

## 2021-06-21 DIAGNOSIS — S09.90XA INJURY OF HEAD, INITIAL ENCOUNTER: Primary | ICD-10-CM

## 2021-06-21 PROCEDURE — 25000003 PHARM REV CODE 250: Performed by: FAMILY MEDICINE

## 2021-06-21 PROCEDURE — 70450 CT HEAD/BRAIN W/O DYE: CPT | Mod: 26,,, | Performed by: RADIOLOGY

## 2021-06-21 PROCEDURE — 70450 CT HEAD WITHOUT CONTRAST: ICD-10-PCS | Mod: 26,,, | Performed by: RADIOLOGY

## 2021-06-21 PROCEDURE — 99284 EMERGENCY DEPT VISIT MOD MDM: CPT | Mod: 25

## 2021-06-21 PROCEDURE — 70450 CT HEAD/BRAIN W/O DYE: CPT | Mod: TC

## 2021-06-21 RX ORDER — HYDROCODONE BITARTRATE AND ACETAMINOPHEN 5; 325 MG/1; MG/1
1 TABLET ORAL
Status: COMPLETED | OUTPATIENT
Start: 2021-06-21 | End: 2021-06-21

## 2021-06-21 RX ADMIN — HYDROCODONE BITARTRATE AND ACETAMINOPHEN 1 TABLET: 5; 325 TABLET ORAL at 11:06

## 2021-06-22 VITALS
HEIGHT: 63 IN | HEART RATE: 68 BPM | SYSTOLIC BLOOD PRESSURE: 98 MMHG | OXYGEN SATURATION: 98 % | RESPIRATION RATE: 16 BRPM | WEIGHT: 201 LBS | DIASTOLIC BLOOD PRESSURE: 60 MMHG | TEMPERATURE: 97 F | BODY MASS INDEX: 35.61 KG/M2

## 2021-06-22 RX ORDER — TRAMADOL HYDROCHLORIDE 50 MG/1
100 TABLET ORAL EVERY 8 HOURS PRN
Qty: 8 TABLET | Refills: 0 | Status: SHIPPED | OUTPATIENT
Start: 2021-06-22 | End: 2021-09-13

## 2021-06-23 ENCOUNTER — HOSPITAL ENCOUNTER (EMERGENCY)
Facility: HOSPITAL | Age: 31
Discharge: HOME | End: 2021-06-23
Attending: EMERGENCY MEDICINE
Payer: COMMERCIAL

## 2021-06-23 VITALS
RESPIRATION RATE: 16 BRPM | TEMPERATURE: 98 F | WEIGHT: 200 LBS | SYSTOLIC BLOOD PRESSURE: 96 MMHG | OXYGEN SATURATION: 99 % | DIASTOLIC BLOOD PRESSURE: 50 MMHG | HEIGHT: 62 IN | BODY MASS INDEX: 36.8 KG/M2 | HEART RATE: 78 BPM

## 2021-06-23 DIAGNOSIS — F07.81 POST CONCUSSIVE SYNDROME: Primary | ICD-10-CM

## 2021-06-23 LAB
B-HCG UR QL: NEGATIVE
CTP QC/QA: YES

## 2021-06-23 PROCEDURE — 81025 URINE PREGNANCY TEST: CPT | Performed by: EMERGENCY MEDICINE

## 2021-06-23 PROCEDURE — 99284 EMERGENCY DEPT VISIT MOD MDM: CPT | Mod: 25

## 2021-06-23 RX ORDER — ACETAMINOPHEN 325 MG/1
650 TABLET ORAL
Status: DISCONTINUED | OUTPATIENT
Start: 2021-06-23 | End: 2021-06-23 | Stop reason: HOSPADM

## 2021-07-22 ENCOUNTER — LAB VISIT (OUTPATIENT)
Dept: LAB | Facility: HOSPITAL | Age: 31
End: 2021-07-22
Attending: FAMILY MEDICINE
Payer: COMMERCIAL

## 2021-07-22 DIAGNOSIS — E03.9 HYPOTHYROIDISM, UNSPECIFIED TYPE: ICD-10-CM

## 2021-07-22 LAB — TSH SERPL DL<=0.005 MIU/L-ACNC: 3.96 UIU/ML (ref 0.4–4)

## 2021-07-22 PROCEDURE — 84443 ASSAY THYROID STIM HORMONE: CPT | Performed by: FAMILY MEDICINE

## 2021-07-22 PROCEDURE — 36415 COLL VENOUS BLD VENIPUNCTURE: CPT | Performed by: FAMILY MEDICINE

## 2021-08-04 ENCOUNTER — PATIENT MESSAGE (OUTPATIENT)
Dept: FAMILY MEDICINE | Facility: CLINIC | Age: 31
End: 2021-08-04

## 2021-08-20 ENCOUNTER — LAB VISIT (OUTPATIENT)
Dept: FAMILY MEDICINE | Facility: CLINIC | Age: 31
End: 2021-08-20
Payer: COMMERCIAL

## 2021-08-20 DIAGNOSIS — R05.9 COUGH: Primary | ICD-10-CM

## 2021-08-20 DIAGNOSIS — R05.8 COUGH WITH EXPOSURE TO COVID-19 VIRUS: ICD-10-CM

## 2021-08-20 DIAGNOSIS — Z20.822 COUGH WITH EXPOSURE TO COVID-19 VIRUS: ICD-10-CM

## 2021-08-20 PROCEDURE — U0005 INFEC AGEN DETEC AMPLI PROBE: HCPCS | Performed by: FAMILY MEDICINE

## 2021-08-20 PROCEDURE — U0003 INFECTIOUS AGENT DETECTION BY NUCLEIC ACID (DNA OR RNA); SEVERE ACUTE RESPIRATORY SYNDROME CORONAVIRUS 2 (SARS-COV-2) (CORONAVIRUS DISEASE [COVID-19]), AMPLIFIED PROBE TECHNIQUE, MAKING USE OF HIGH THROUGHPUT TECHNOLOGIES AS DESCRIBED BY CMS-2020-01-R: HCPCS | Performed by: FAMILY MEDICINE

## 2021-08-21 LAB — SARS-COV-2- CYCLE NUMBER: 13.92

## 2021-08-22 LAB — SARS-COV-2 RNA RESP QL NAA+PROBE: DETECTED

## 2021-08-24 ENCOUNTER — TELEPHONE (OUTPATIENT)
Dept: FAMILY MEDICINE | Facility: CLINIC | Age: 31
End: 2021-08-24

## 2021-09-04 ENCOUNTER — HOSPITAL ENCOUNTER (EMERGENCY)
Facility: HOSPITAL | Age: 31
Discharge: LEFT BEFORE DISCHARGE | End: 2021-09-04
Attending: FAMILY MEDICINE
Payer: COMMERCIAL

## 2021-09-04 VITALS
DIASTOLIC BLOOD PRESSURE: 76 MMHG | SYSTOLIC BLOOD PRESSURE: 120 MMHG | WEIGHT: 198 LBS | OXYGEN SATURATION: 100 % | RESPIRATION RATE: 17 BRPM | HEART RATE: 73 BPM | BODY MASS INDEX: 36.44 KG/M2 | TEMPERATURE: 99 F | HEIGHT: 62 IN

## 2021-09-04 DIAGNOSIS — G98.8 NEUROLOGIC DISORDER: Primary | ICD-10-CM

## 2021-09-04 LAB
ALBUMIN SERPL BCP-MCNC: 3.7 G/DL (ref 3.5–5.2)
ALP SERPL-CCNC: 91 U/L (ref 55–135)
ALT SERPL W/O P-5'-P-CCNC: 44 U/L (ref 10–44)
ANION GAP SERPL CALC-SCNC: 12 MMOL/L (ref 8–16)
AST SERPL-CCNC: 29 U/L (ref 10–40)
BASOPHILS # BLD AUTO: 0.02 K/UL (ref 0–0.2)
BASOPHILS NFR BLD: 0.2 % (ref 0–1.9)
BILIRUB SERPL-MCNC: 0.7 MG/DL (ref 0.1–1)
BUN SERPL-MCNC: 8 MG/DL (ref 6–20)
CALCIUM SERPL-MCNC: 9.5 MG/DL (ref 8.7–10.5)
CHLORIDE SERPL-SCNC: 110 MMOL/L (ref 95–110)
CO2 SERPL-SCNC: 20 MMOL/L (ref 23–29)
CREAT SERPL-MCNC: 1 MG/DL (ref 0.5–1.4)
DIFFERENTIAL METHOD: NORMAL
EOSINOPHIL # BLD AUTO: 0.1 K/UL (ref 0–0.5)
EOSINOPHIL NFR BLD: 1.1 % (ref 0–8)
ERYTHROCYTE [DISTWIDTH] IN BLOOD BY AUTOMATED COUNT: 13.5 % (ref 11.5–14.5)
EST. GFR  (AFRICAN AMERICAN): >60 ML/MIN/1.73 M^2
EST. GFR  (NON AFRICAN AMERICAN): >60 ML/MIN/1.73 M^2
GLUCOSE SERPL-MCNC: 92 MG/DL (ref 70–110)
HCT VFR BLD AUTO: 40.7 % (ref 37–48.5)
HGB BLD-MCNC: 13.8 G/DL (ref 12–16)
IMM GRANULOCYTES # BLD AUTO: 0.03 K/UL (ref 0–0.04)
IMM GRANULOCYTES NFR BLD AUTO: 0.3 % (ref 0–0.5)
LYMPHOCYTES # BLD AUTO: 3.4 K/UL (ref 1–4.8)
LYMPHOCYTES NFR BLD: 36.6 % (ref 18–48)
MCH RBC QN AUTO: 30.4 PG (ref 27–31)
MCHC RBC AUTO-ENTMCNC: 33.9 G/DL (ref 32–36)
MCV RBC AUTO: 90 FL (ref 82–98)
MONOCYTES # BLD AUTO: 0.7 K/UL (ref 0.3–1)
MONOCYTES NFR BLD: 7.2 % (ref 4–15)
NEUTROPHILS # BLD AUTO: 5 K/UL (ref 1.8–7.7)
NEUTROPHILS NFR BLD: 54.6 % (ref 38–73)
NRBC BLD-RTO: 0 /100 WBC
PLATELET # BLD AUTO: 214 K/UL (ref 150–450)
PMV BLD AUTO: 11.1 FL (ref 9.2–12.9)
POTASSIUM SERPL-SCNC: 3.7 MMOL/L (ref 3.5–5.1)
PROT SERPL-MCNC: 7.1 G/DL (ref 6–8.4)
RBC # BLD AUTO: 4.54 M/UL (ref 4–5.4)
SODIUM SERPL-SCNC: 142 MMOL/L (ref 136–145)
WBC # BLD AUTO: 9.18 K/UL (ref 3.9–12.7)

## 2021-09-04 PROCEDURE — 85025 COMPLETE CBC W/AUTO DIFF WBC: CPT | Performed by: FAMILY MEDICINE

## 2021-09-04 PROCEDURE — 80053 COMPREHEN METABOLIC PANEL: CPT | Performed by: FAMILY MEDICINE

## 2021-09-04 PROCEDURE — 99283 EMERGENCY DEPT VISIT LOW MDM: CPT

## 2021-09-04 PROCEDURE — 25000003 PHARM REV CODE 250: Performed by: FAMILY MEDICINE

## 2021-09-04 RX ORDER — LORAZEPAM 2 MG/ML
1 INJECTION INTRAMUSCULAR
Status: DISCONTINUED | OUTPATIENT
Start: 2021-09-04 | End: 2021-09-04 | Stop reason: HOSPADM

## 2021-09-04 RX ORDER — LORAZEPAM 0.5 MG/1
1 TABLET ORAL
Status: COMPLETED | OUTPATIENT
Start: 2021-09-04 | End: 2021-09-04

## 2021-09-04 RX ADMIN — LORAZEPAM 1 MG: 0.5 TABLET ORAL at 04:09

## 2021-09-08 ENCOUNTER — OFFICE VISIT (OUTPATIENT)
Dept: FAMILY MEDICINE | Facility: CLINIC | Age: 31
End: 2021-09-08
Payer: COMMERCIAL

## 2021-09-08 VITALS
HEART RATE: 67 BPM | WEIGHT: 200.5 LBS | HEIGHT: 62 IN | RESPIRATION RATE: 16 BRPM | SYSTOLIC BLOOD PRESSURE: 109 MMHG | BODY MASS INDEX: 36.9 KG/M2 | OXYGEN SATURATION: 99 % | DIASTOLIC BLOOD PRESSURE: 72 MMHG

## 2021-09-08 DIAGNOSIS — G47.9 SLEEP DISTURBANCE: ICD-10-CM

## 2021-09-08 DIAGNOSIS — E03.9 HYPOTHYROIDISM, UNSPECIFIED TYPE: Primary | ICD-10-CM

## 2021-09-08 PROCEDURE — 1159F MED LIST DOCD IN RCRD: CPT | Mod: ,,, | Performed by: FAMILY MEDICINE

## 2021-09-08 PROCEDURE — 99214 PR OFFICE/OUTPT VISIT, EST, LEVL IV, 30-39 MIN: ICD-10-PCS | Mod: ,,, | Performed by: FAMILY MEDICINE

## 2021-09-08 PROCEDURE — 3078F DIAST BP <80 MM HG: CPT | Mod: ,,, | Performed by: FAMILY MEDICINE

## 2021-09-08 PROCEDURE — 3074F PR MOST RECENT SYSTOLIC BLOOD PRESSURE < 130 MM HG: ICD-10-PCS | Mod: ,,, | Performed by: FAMILY MEDICINE

## 2021-09-08 PROCEDURE — 3078F PR MOST RECENT DIASTOLIC BLOOD PRESSURE < 80 MM HG: ICD-10-PCS | Mod: ,,, | Performed by: FAMILY MEDICINE

## 2021-09-08 PROCEDURE — 1159F PR MEDICATION LIST DOCUMENTED IN MEDICAL RECORD: ICD-10-PCS | Mod: ,,, | Performed by: FAMILY MEDICINE

## 2021-09-08 PROCEDURE — 3008F BODY MASS INDEX DOCD: CPT | Mod: ,,, | Performed by: FAMILY MEDICINE

## 2021-09-08 PROCEDURE — 3074F SYST BP LT 130 MM HG: CPT | Mod: ,,, | Performed by: FAMILY MEDICINE

## 2021-09-08 PROCEDURE — 3008F PR BODY MASS INDEX (BMI) DOCUMENTED: ICD-10-PCS | Mod: ,,, | Performed by: FAMILY MEDICINE

## 2021-09-08 PROCEDURE — 99214 OFFICE O/P EST MOD 30 MIN: CPT | Mod: ,,, | Performed by: FAMILY MEDICINE

## 2021-09-08 RX ORDER — RIMEGEPANT SULFATE 75 MG/75MG
1 TABLET, ORALLY DISINTEGRATING ORAL
COMMUNITY
Start: 2021-08-21 | End: 2022-10-05

## 2021-09-10 DIAGNOSIS — I67.9 CEREBROVASCULAR DISEASE, UNSPECIFIED: ICD-10-CM

## 2021-09-10 DIAGNOSIS — F07.81 POSTCONCUSSION SYNDROME: Primary | ICD-10-CM

## 2021-09-10 DIAGNOSIS — Z86.73 HISTORY OF STROKE: ICD-10-CM

## 2021-09-10 DIAGNOSIS — R90.89 MRI OF BRAIN ABNORMAL: ICD-10-CM

## 2021-09-10 DIAGNOSIS — R40.4 STARING EPISODES: ICD-10-CM

## 2021-09-13 ENCOUNTER — OFFICE VISIT (OUTPATIENT)
Dept: CARDIOLOGY | Facility: CLINIC | Age: 31
End: 2021-09-13
Payer: COMMERCIAL

## 2021-09-13 ENCOUNTER — TELEPHONE (OUTPATIENT)
Dept: CARDIOLOGY | Facility: CLINIC | Age: 31
End: 2021-09-13

## 2021-09-13 ENCOUNTER — PATIENT MESSAGE (OUTPATIENT)
Dept: FAMILY MEDICINE | Facility: CLINIC | Age: 31
End: 2021-09-13

## 2021-09-13 ENCOUNTER — HOSPITAL ENCOUNTER (OUTPATIENT)
Dept: RADIOLOGY | Facility: HOSPITAL | Age: 31
Discharge: HOME | End: 2021-09-13
Attending: STUDENT IN AN ORGANIZED HEALTH CARE EDUCATION/TRAINING PROGRAM
Payer: COMMERCIAL

## 2021-09-13 VITALS
WEIGHT: 199 LBS | HEIGHT: 62 IN | BODY MASS INDEX: 36.62 KG/M2 | DIASTOLIC BLOOD PRESSURE: 80 MMHG | HEART RATE: 80 BPM | OXYGEN SATURATION: 96 % | SYSTOLIC BLOOD PRESSURE: 112 MMHG

## 2021-09-13 DIAGNOSIS — R40.4 STARING EPISODES: ICD-10-CM

## 2021-09-13 DIAGNOSIS — I67.9 CEREBROVASCULAR DISEASE, UNSPECIFIED: ICD-10-CM

## 2021-09-13 DIAGNOSIS — R90.89 MRI OF BRAIN ABNORMAL: ICD-10-CM

## 2021-09-13 DIAGNOSIS — Z86.73 HISTORY OF STROKE: ICD-10-CM

## 2021-09-13 DIAGNOSIS — I63.9 CRYPTOGENIC STROKE: ICD-10-CM

## 2021-09-13 DIAGNOSIS — F07.81 POSTCONCUSSION SYNDROME: ICD-10-CM

## 2021-09-13 DIAGNOSIS — I34.0 MITRAL VALVE INSUFFICIENCY, UNSPECIFIED ETIOLOGY: Primary | ICD-10-CM

## 2021-09-13 PROBLEM — G44.52 NEW DAILY PERSISTENT HEADACHE: Status: ACTIVE | Noted: 2021-09-13

## 2021-09-13 PROBLEM — I05.9 MITRAL VALVE DISORDER: Status: ACTIVE | Noted: 2021-09-13

## 2021-09-13 PROBLEM — I07.9 TRICUSPID VALVE DISORDER: Status: ACTIVE | Noted: 2021-09-13

## 2021-09-13 PROBLEM — G35 MULTIPLE SCLEROSIS: Status: ACTIVE | Noted: 2021-09-13

## 2021-09-13 PROBLEM — R56.9 SEIZURE: Status: ACTIVE | Noted: 2021-09-13

## 2021-09-13 PROCEDURE — 3008F PR BODY MASS INDEX (BMI) DOCUMENTED: ICD-10-PCS | Mod: CPTII,S$GLB,, | Performed by: INTERNAL MEDICINE

## 2021-09-13 PROCEDURE — 25500020 PHARM REV CODE 255: Performed by: STUDENT IN AN ORGANIZED HEALTH CARE EDUCATION/TRAINING PROGRAM

## 2021-09-13 PROCEDURE — 1160F RVW MEDS BY RX/DR IN RCRD: CPT | Mod: CPTII,S$GLB,, | Performed by: INTERNAL MEDICINE

## 2021-09-13 PROCEDURE — 3008F BODY MASS INDEX DOCD: CPT | Mod: CPTII,S$GLB,, | Performed by: INTERNAL MEDICINE

## 2021-09-13 PROCEDURE — 3074F SYST BP LT 130 MM HG: CPT | Mod: CPTII,S$GLB,, | Performed by: INTERNAL MEDICINE

## 2021-09-13 PROCEDURE — 1159F PR MEDICATION LIST DOCUMENTED IN MEDICAL RECORD: ICD-10-PCS | Mod: CPTII,S$GLB,, | Performed by: INTERNAL MEDICINE

## 2021-09-13 PROCEDURE — 99202 PR OFFICE/OUTPT VISIT, NEW, LEVL II, 15-29 MIN: ICD-10-PCS | Mod: S$GLB,,, | Performed by: INTERNAL MEDICINE

## 2021-09-13 PROCEDURE — 3079F DIAST BP 80-89 MM HG: CPT | Mod: CPTII,S$GLB,, | Performed by: INTERNAL MEDICINE

## 2021-09-13 PROCEDURE — 93000 EKG 12-LEAD: ICD-10-PCS | Mod: S$GLB,,, | Performed by: INTERNAL MEDICINE

## 2021-09-13 PROCEDURE — 3044F HG A1C LEVEL LT 7.0%: CPT | Mod: CPTII,S$GLB,, | Performed by: INTERNAL MEDICINE

## 2021-09-13 PROCEDURE — 93000 ELECTROCARDIOGRAM COMPLETE: CPT | Mod: S$GLB,,, | Performed by: INTERNAL MEDICINE

## 2021-09-13 PROCEDURE — 3074F PR MOST RECENT SYSTOLIC BLOOD PRESSURE < 130 MM HG: ICD-10-PCS | Mod: CPTII,S$GLB,, | Performed by: INTERNAL MEDICINE

## 2021-09-13 PROCEDURE — 3044F PR MOST RECENT HEMOGLOBIN A1C LEVEL <7.0%: ICD-10-PCS | Mod: CPTII,S$GLB,, | Performed by: INTERNAL MEDICINE

## 2021-09-13 PROCEDURE — 1159F MED LIST DOCD IN RCRD: CPT | Mod: CPTII,S$GLB,, | Performed by: INTERNAL MEDICINE

## 2021-09-13 PROCEDURE — A9585 GADOBUTROL INJECTION: HCPCS | Performed by: STUDENT IN AN ORGANIZED HEALTH CARE EDUCATION/TRAINING PROGRAM

## 2021-09-13 PROCEDURE — 1160F PR REVIEW ALL MEDS BY PRESCRIBER/CLIN PHARMACIST DOCUMENTED: ICD-10-PCS | Mod: CPTII,S$GLB,, | Performed by: INTERNAL MEDICINE

## 2021-09-13 PROCEDURE — 99202 OFFICE O/P NEW SF 15 MIN: CPT | Mod: S$GLB,,, | Performed by: INTERNAL MEDICINE

## 2021-09-13 PROCEDURE — 3079F PR MOST RECENT DIASTOLIC BLOOD PRESSURE 80-89 MM HG: ICD-10-PCS | Mod: CPTII,S$GLB,, | Performed by: INTERNAL MEDICINE

## 2021-09-13 PROCEDURE — 70553 MRI BRAIN STEM W/O & W/DYE: CPT | Mod: TC

## 2021-09-13 RX ORDER — GADOBUTROL 604.72 MG/ML
9 INJECTION INTRAVENOUS
Status: COMPLETED | OUTPATIENT
Start: 2021-09-13 | End: 2021-09-13

## 2021-09-13 RX ADMIN — GADOBUTROL 9 ML: 604.72 INJECTION INTRAVENOUS at 01:09

## 2021-09-14 ENCOUNTER — LAB VISIT (OUTPATIENT)
Dept: LAB | Facility: HOSPITAL | Age: 31
End: 2021-09-14
Attending: STUDENT IN AN ORGANIZED HEALTH CARE EDUCATION/TRAINING PROGRAM
Payer: COMMERCIAL

## 2021-09-14 DIAGNOSIS — R40.4 TRANSIENT ALTERATION OF AWARENESS: Primary | ICD-10-CM

## 2021-09-14 DIAGNOSIS — R56.9 GENERALIZED-ONSET SEIZURES: ICD-10-CM

## 2021-09-14 LAB
CRP SERPL-MCNC: 6.3 MG/L (ref 0–3.19)
ERYTHROCYTE [SEDIMENTATION RATE] IN BLOOD BY WESTERGREN METHOD: 40 MM/HR (ref 0–20)
ESTIMATED AVG GLUCOSE: 94 MG/DL (ref 68–131)
HBA1C MFR BLD: 4.9 % (ref 4–5.6)
HCYS SERPL-SCNC: 8.4 UMOL/L (ref 4–15.5)
T3FREE SERPL-MCNC: 3.7 PG/ML (ref 2.3–4.2)
T4 FREE SERPL-MCNC: 0.8 NG/DL (ref 0.71–1.51)
TSH SERPL DL<=0.005 MIU/L-ACNC: 4.61 UIU/ML (ref 0.4–4)

## 2021-09-14 PROCEDURE — 84443 ASSAY THYROID STIM HORMONE: CPT | Performed by: STUDENT IN AN ORGANIZED HEALTH CARE EDUCATION/TRAINING PROGRAM

## 2021-09-14 PROCEDURE — 86235 NUCLEAR ANTIGEN ANTIBODY: CPT | Mod: 59 | Performed by: STUDENT IN AN ORGANIZED HEALTH CARE EDUCATION/TRAINING PROGRAM

## 2021-09-14 PROCEDURE — 83090 ASSAY OF HOMOCYSTEINE: CPT | Performed by: STUDENT IN AN ORGANIZED HEALTH CARE EDUCATION/TRAINING PROGRAM

## 2021-09-14 PROCEDURE — 85651 RBC SED RATE NONAUTOMATED: CPT | Performed by: STUDENT IN AN ORGANIZED HEALTH CARE EDUCATION/TRAINING PROGRAM

## 2021-09-14 PROCEDURE — 85613 RUSSELL VIPER VENOM DILUTED: CPT | Mod: 91 | Performed by: STUDENT IN AN ORGANIZED HEALTH CARE EDUCATION/TRAINING PROGRAM

## 2021-09-14 PROCEDURE — 85210 CLOT FACTOR II PROTHROM SPEC: CPT | Performed by: STUDENT IN AN ORGANIZED HEALTH CARE EDUCATION/TRAINING PROGRAM

## 2021-09-14 PROCEDURE — 85240 CLOT FACTOR VIII AHG 1 STAGE: CPT | Performed by: STUDENT IN AN ORGANIZED HEALTH CARE EDUCATION/TRAINING PROGRAM

## 2021-09-14 PROCEDURE — 36415 COLL VENOUS BLD VENIPUNCTURE: CPT | Performed by: STUDENT IN AN ORGANIZED HEALTH CARE EDUCATION/TRAINING PROGRAM

## 2021-09-14 PROCEDURE — 86147 CARDIOLIPIN ANTIBODY EA IG: CPT | Mod: 59 | Performed by: STUDENT IN AN ORGANIZED HEALTH CARE EDUCATION/TRAINING PROGRAM

## 2021-09-14 PROCEDURE — 86255 FLUORESCENT ANTIBODY SCREEN: CPT | Mod: 91 | Performed by: STUDENT IN AN ORGANIZED HEALTH CARE EDUCATION/TRAINING PROGRAM

## 2021-09-14 PROCEDURE — 83036 HEMOGLOBIN GLYCOSYLATED A1C: CPT | Performed by: STUDENT IN AN ORGANIZED HEALTH CARE EDUCATION/TRAINING PROGRAM

## 2021-09-14 PROCEDURE — 85220 BLOOC CLOT FACTOR V TEST: CPT | Performed by: STUDENT IN AN ORGANIZED HEALTH CARE EDUCATION/TRAINING PROGRAM

## 2021-09-14 PROCEDURE — 84481 FREE ASSAY (FT-3): CPT | Performed by: STUDENT IN AN ORGANIZED HEALTH CARE EDUCATION/TRAINING PROGRAM

## 2021-09-14 PROCEDURE — 85613 RUSSELL VIPER VENOM DILUTED: CPT | Performed by: STUDENT IN AN ORGANIZED HEALTH CARE EDUCATION/TRAINING PROGRAM

## 2021-09-14 PROCEDURE — 86039 ANTINUCLEAR ANTIBODIES (ANA): CPT | Performed by: STUDENT IN AN ORGANIZED HEALTH CARE EDUCATION/TRAINING PROGRAM

## 2021-09-14 PROCEDURE — 84439 ASSAY OF FREE THYROXINE: CPT | Performed by: STUDENT IN AN ORGANIZED HEALTH CARE EDUCATION/TRAINING PROGRAM

## 2021-09-14 PROCEDURE — 86038 ANTINUCLEAR ANTIBODIES: CPT | Performed by: STUDENT IN AN ORGANIZED HEALTH CARE EDUCATION/TRAINING PROGRAM

## 2021-09-14 PROCEDURE — 86141 C-REACTIVE PROTEIN HS: CPT | Performed by: STUDENT IN AN ORGANIZED HEALTH CARE EDUCATION/TRAINING PROGRAM

## 2021-09-15 LAB
ANA PATTERN 1: NORMAL
ANA SER QL IF: POSITIVE
ANA TITR SER IF: NORMAL {TITER}
FACT V ACT/NOR PPP: 98 % (ref 60–145)
FACT VIII ACT/NOR PPP: 194 % (ref 60–170)
PROTHROM ACT/NOR PPP: 104 % (ref 70–130)

## 2021-09-17 LAB
ANTI SM ANTIBODY: 0.08 RATIO (ref 0–0.99)
ANTI SM/RNP ANTIBODY: 0.07 RATIO (ref 0–0.99)
ANTI-SM INTERPRETATION: NEGATIVE
ANTI-SM/RNP INTERPRETATION: NEGATIVE
ANTI-SSA ANTIBODY: 0.1 RATIO (ref 0–0.99)
ANTI-SSA INTERPRETATION: NEGATIVE
ANTI-SSB ANTIBODY: 0.1 RATIO (ref 0–0.99)
ANTI-SSB INTERPRETATION: NEGATIVE
DSDNA AB SER-ACNC: NORMAL [IU]/ML

## 2021-09-18 LAB
APTT IMM NP PPP: ABNORMAL SEC (ref 32–48)
APTT P HEP NEUT PPP: ABNORMAL SEC (ref 32–48)
CONFIRM APTT STACLOT: ABNORMAL
DRVVT SCREEN TO CONFIRM RATIO: ABNORMAL RATIO
LA 3 SCREEN W REFLEX-IMP: ABNORMAL
LA NT DPL PPP QL: ABNORMAL
MIXING DRVVT: 41 SEC (ref 33–44)
PROTHROMBIN TIME: 13.6 SEC (ref 12–15.5)
REPTILASE TIME: ABNORMAL SEC
SCREEN APTT: 48 SEC (ref 32–48)
SCREEN DRVVT: 50 SEC (ref 33–44)
THROMBIN TIME: ABNORMAL SEC (ref 14.7–19.5)

## 2021-09-20 ENCOUNTER — TELEPHONE (OUTPATIENT)
Dept: RHEUMATOLOGY | Facility: CLINIC | Age: 31
End: 2021-09-20

## 2021-09-20 LAB
ANCA AB TITR SER IF: NORMAL TITER
CARDIOLIPIN IGG SER IA-ACNC: <9.4 GPL (ref 0–14.99)
CARDIOLIPIN IGM SER IA-ACNC: 22.7 MPL (ref 0–12.49)
P-ANCA TITR SER IF: NORMAL TITER

## 2021-09-22 ENCOUNTER — PATIENT OUTREACH (OUTPATIENT)
Dept: ADMINISTRATIVE | Facility: OTHER | Age: 31
End: 2021-09-22

## 2021-09-22 ENCOUNTER — TELEPHONE (OUTPATIENT)
Dept: CARDIOLOGY | Facility: CLINIC | Age: 31
End: 2021-09-22

## 2021-10-04 ENCOUNTER — OFFICE VISIT (OUTPATIENT)
Dept: RHEUMATOLOGY | Facility: CLINIC | Age: 31
End: 2021-10-04
Payer: COMMERCIAL

## 2021-10-04 ENCOUNTER — HOSPITAL ENCOUNTER (OUTPATIENT)
Dept: RADIOLOGY | Facility: HOSPITAL | Age: 31
Discharge: HOME | End: 2021-10-04
Attending: INTERNAL MEDICINE
Payer: COMMERCIAL

## 2021-10-04 VITALS
WEIGHT: 206.38 LBS | DIASTOLIC BLOOD PRESSURE: 67 MMHG | BODY MASS INDEX: 36.57 KG/M2 | HEART RATE: 62 BPM | HEIGHT: 63 IN | SYSTOLIC BLOOD PRESSURE: 120 MMHG

## 2021-10-04 DIAGNOSIS — M79.10 MYALGIA: ICD-10-CM

## 2021-10-04 DIAGNOSIS — I67.9 CEREBROVASCULAR DISEASE: ICD-10-CM

## 2021-10-04 DIAGNOSIS — F41.9 ANXIETY: ICD-10-CM

## 2021-10-04 DIAGNOSIS — R76.8 POSITIVE ANA (ANTINUCLEAR ANTIBODY): Primary | ICD-10-CM

## 2021-10-04 DIAGNOSIS — G43.909 MIGRAINE WITHOUT STATUS MIGRAINOSUS, NOT INTRACTABLE, UNSPECIFIED MIGRAINE TYPE: ICD-10-CM

## 2021-10-04 DIAGNOSIS — R76.8 POSITIVE ANA (ANTINUCLEAR ANTIBODY): ICD-10-CM

## 2021-10-04 PROCEDURE — 71046 XR CHEST PA AND LATERAL: ICD-10-PCS | Mod: 26,,, | Performed by: RADIOLOGY

## 2021-10-04 PROCEDURE — 71046 X-RAY EXAM CHEST 2 VIEWS: CPT | Mod: TC

## 2021-10-04 PROCEDURE — 71046 X-RAY EXAM CHEST 2 VIEWS: CPT | Mod: 26,,, | Performed by: RADIOLOGY

## 2021-10-04 PROCEDURE — 3044F HG A1C LEVEL LT 7.0%: CPT | Mod: CPTII,S$GLB,, | Performed by: INTERNAL MEDICINE

## 2021-10-04 PROCEDURE — 99999 PR PBB SHADOW E&M-EST. PATIENT-LVL IV: CPT | Mod: PBBFAC,,, | Performed by: INTERNAL MEDICINE

## 2021-10-04 PROCEDURE — 1159F MED LIST DOCD IN RCRD: CPT | Mod: CPTII,S$GLB,, | Performed by: INTERNAL MEDICINE

## 2021-10-04 PROCEDURE — 3078F PR MOST RECENT DIASTOLIC BLOOD PRESSURE < 80 MM HG: ICD-10-PCS | Mod: CPTII,S$GLB,, | Performed by: INTERNAL MEDICINE

## 2021-10-04 PROCEDURE — 3074F SYST BP LT 130 MM HG: CPT | Mod: CPTII,S$GLB,, | Performed by: INTERNAL MEDICINE

## 2021-10-04 PROCEDURE — 99204 PR OFFICE/OUTPT VISIT, NEW, LEVL IV, 45-59 MIN: ICD-10-PCS | Mod: S$GLB,,, | Performed by: INTERNAL MEDICINE

## 2021-10-04 PROCEDURE — 3044F PR MOST RECENT HEMOGLOBIN A1C LEVEL <7.0%: ICD-10-PCS | Mod: CPTII,S$GLB,, | Performed by: INTERNAL MEDICINE

## 2021-10-04 PROCEDURE — 3074F PR MOST RECENT SYSTOLIC BLOOD PRESSURE < 130 MM HG: ICD-10-PCS | Mod: CPTII,S$GLB,, | Performed by: INTERNAL MEDICINE

## 2021-10-04 PROCEDURE — 3008F PR BODY MASS INDEX (BMI) DOCUMENTED: ICD-10-PCS | Mod: CPTII,S$GLB,, | Performed by: INTERNAL MEDICINE

## 2021-10-04 PROCEDURE — 99999 PR PBB SHADOW E&M-EST. PATIENT-LVL IV: ICD-10-PCS | Mod: PBBFAC,,, | Performed by: INTERNAL MEDICINE

## 2021-10-04 PROCEDURE — 3008F BODY MASS INDEX DOCD: CPT | Mod: CPTII,S$GLB,, | Performed by: INTERNAL MEDICINE

## 2021-10-04 PROCEDURE — 3078F DIAST BP <80 MM HG: CPT | Mod: CPTII,S$GLB,, | Performed by: INTERNAL MEDICINE

## 2021-10-04 PROCEDURE — 99204 OFFICE O/P NEW MOD 45 MIN: CPT | Mod: S$GLB,,, | Performed by: INTERNAL MEDICINE

## 2021-10-04 PROCEDURE — 1160F RVW MEDS BY RX/DR IN RCRD: CPT | Mod: CPTII,S$GLB,, | Performed by: INTERNAL MEDICINE

## 2021-10-04 PROCEDURE — 1160F PR REVIEW ALL MEDS BY PRESCRIBER/CLIN PHARMACIST DOCUMENTED: ICD-10-PCS | Mod: CPTII,S$GLB,, | Performed by: INTERNAL MEDICINE

## 2021-10-04 PROCEDURE — 1159F PR MEDICATION LIST DOCUMENTED IN MEDICAL RECORD: ICD-10-PCS | Mod: CPTII,S$GLB,, | Performed by: INTERNAL MEDICINE

## 2021-10-04 ASSESSMENT — ROUTINE ASSESSMENT OF PATIENT INDEX DATA (RAPID3)
PATIENT GLOBAL ASSESSMENT SCORE: 10
FATIGUE SCORE: 8.5
TOTAL RAPID3 SCORE: 8.22
MDHAQ FUNCTION SCORE: 1.7
PSYCHOLOGICAL DISTRESS SCORE: 9.9
PAIN SCORE: 9
AM STIFFNESS SCORE: 1, YES
WHEN YOU AWAKENED IN THE MORNING OVER THE LAST WEEK, PLEASE INDICATE THE AMOUNT OF TIME IT TAKES UNTIL YOU ARE AS LIMBER AS YOU WILL BE FOR THE DAY: 1 HR

## 2021-10-07 ENCOUNTER — PATIENT MESSAGE (OUTPATIENT)
Dept: RHEUMATOLOGY | Facility: CLINIC | Age: 31
End: 2021-10-07

## 2021-10-20 ENCOUNTER — OFFICE VISIT (OUTPATIENT)
Dept: FAMILY MEDICINE | Facility: CLINIC | Age: 31
End: 2021-10-20
Payer: COMMERCIAL

## 2021-10-20 DIAGNOSIS — F33.2 SEVERE EPISODE OF RECURRENT MAJOR DEPRESSIVE DISORDER, WITHOUT PSYCHOTIC FEATURES: Primary | ICD-10-CM

## 2021-10-20 PROCEDURE — 3044F HG A1C LEVEL LT 7.0%: CPT | Mod: 95,,, | Performed by: FAMILY MEDICINE

## 2021-10-20 PROCEDURE — 99214 PR OFFICE/OUTPT VISIT, EST, LEVL IV, 30-39 MIN: ICD-10-PCS | Mod: 95,,, | Performed by: FAMILY MEDICINE

## 2021-10-20 PROCEDURE — 3044F PR MOST RECENT HEMOGLOBIN A1C LEVEL <7.0%: ICD-10-PCS | Mod: 95,,, | Performed by: FAMILY MEDICINE

## 2021-10-20 PROCEDURE — 99214 OFFICE O/P EST MOD 30 MIN: CPT | Mod: 95,,, | Performed by: FAMILY MEDICINE

## 2021-10-20 RX ORDER — ESCITALOPRAM OXALATE 10 MG/1
10 TABLET ORAL DAILY
Qty: 30 TABLET | Refills: 3 | Status: SHIPPED | OUTPATIENT
Start: 2021-10-20 | End: 2021-10-21 | Stop reason: SDUPTHER

## 2021-10-21 ENCOUNTER — PATIENT MESSAGE (OUTPATIENT)
Dept: FAMILY MEDICINE | Facility: CLINIC | Age: 31
End: 2021-10-21

## 2021-10-21 DIAGNOSIS — F33.2 SEVERE EPISODE OF RECURRENT MAJOR DEPRESSIVE DISORDER, WITHOUT PSYCHOTIC FEATURES: ICD-10-CM

## 2021-10-21 RX ORDER — ESCITALOPRAM OXALATE 10 MG/1
10 TABLET ORAL DAILY
Qty: 30 TABLET | Refills: 3 | Status: SHIPPED | OUTPATIENT
Start: 2021-10-21 | End: 2021-11-09 | Stop reason: SDUPTHER

## 2021-11-01 ENCOUNTER — PATIENT MESSAGE (OUTPATIENT)
Dept: FAMILY MEDICINE | Facility: CLINIC | Age: 31
End: 2021-11-01
Payer: COMMERCIAL

## 2021-11-01 RX ORDER — OMEPRAZOLE 10 MG/1
10 CAPSULE, DELAYED RELEASE ORAL DAILY
Qty: 30 CAPSULE | Refills: 11 | Status: SHIPPED | OUTPATIENT
Start: 2021-11-01 | End: 2021-11-09 | Stop reason: SDUPTHER

## 2021-11-04 ENCOUNTER — TELEPHONE (OUTPATIENT)
Dept: CARDIOLOGY | Facility: CLINIC | Age: 31
End: 2021-11-04
Payer: OTHER MISCELLANEOUS

## 2021-11-10 DIAGNOSIS — G47.9 FATIGUE DUE TO SLEEP PATTERN DISTURBANCE: ICD-10-CM

## 2021-11-10 DIAGNOSIS — R06.83 SNORING: ICD-10-CM

## 2021-11-10 DIAGNOSIS — G47.19 EXCESSIVE DAYTIME SLEEPINESS: Primary | ICD-10-CM

## 2021-11-10 DIAGNOSIS — R53.83 FATIGUE DUE TO SLEEP PATTERN DISTURBANCE: ICD-10-CM

## 2021-11-18 ENCOUNTER — OCCUPATIONAL HEALTH (OUTPATIENT)
Dept: URGENT CARE | Facility: CLINIC | Age: 31
End: 2021-11-18
Payer: OTHER MISCELLANEOUS

## 2021-11-18 DIAGNOSIS — Z01.812 ENCOUNTER FOR PREPROCEDURE SCREENING LABORATORY TESTING FOR COVID-19: Primary | ICD-10-CM

## 2021-11-18 DIAGNOSIS — Z11.52 ENCOUNTER FOR PREPROCEDURE SCREENING LABORATORY TESTING FOR COVID-19: Primary | ICD-10-CM

## 2021-11-18 LAB
CTP QC/QA: YES
SARS-COV-2 RDRP RESP QL NAA+PROBE: NEGATIVE

## 2021-11-18 PROCEDURE — U0002 COVID-19 LAB TEST NON-CDC: HCPCS | Mod: QW,S$GLB,, | Performed by: EMERGENCY MEDICINE

## 2021-11-18 PROCEDURE — U0002: ICD-10-PCS | Mod: QW,S$GLB,, | Performed by: EMERGENCY MEDICINE

## 2021-11-19 ENCOUNTER — PROCEDURE VISIT (OUTPATIENT)
Dept: SLEEP MEDICINE | Facility: HOSPITAL | Age: 31
End: 2021-11-19
Attending: STUDENT IN AN ORGANIZED HEALTH CARE EDUCATION/TRAINING PROGRAM
Payer: OTHER MISCELLANEOUS

## 2021-11-19 DIAGNOSIS — R53.83 FATIGUE DUE TO SLEEP PATTERN DISTURBANCE: ICD-10-CM

## 2021-11-19 DIAGNOSIS — G47.9 FATIGUE DUE TO SLEEP PATTERN DISTURBANCE: ICD-10-CM

## 2021-11-19 DIAGNOSIS — G47.19 EXCESSIVE DAYTIME SLEEPINESS: ICD-10-CM

## 2021-11-19 DIAGNOSIS — R06.83 SNORING: ICD-10-CM

## 2021-11-19 PROCEDURE — 95810 POLYSOM 6/> YRS 4/> PARAM: CPT

## 2021-11-22 ENCOUNTER — PATIENT MESSAGE (OUTPATIENT)
Dept: FAMILY MEDICINE | Facility: CLINIC | Age: 31
End: 2021-11-22
Payer: COMMERCIAL

## 2021-12-02 ENCOUNTER — PATIENT MESSAGE (OUTPATIENT)
Dept: FAMILY MEDICINE | Facility: CLINIC | Age: 31
End: 2021-12-02
Payer: COMMERCIAL

## 2022-01-26 ENCOUNTER — OFFICE VISIT (OUTPATIENT)
Dept: FAMILY MEDICINE | Facility: CLINIC | Age: 32
End: 2022-01-26
Payer: COMMERCIAL

## 2022-01-26 DIAGNOSIS — F41.9 ANXIETY: ICD-10-CM

## 2022-01-26 DIAGNOSIS — F33.2 SEVERE EPISODE OF RECURRENT MAJOR DEPRESSIVE DISORDER, WITHOUT PSYCHOTIC FEATURES: Primary | ICD-10-CM

## 2022-01-26 PROCEDURE — 99214 PR OFFICE/OUTPT VISIT, EST, LEVL IV, 30-39 MIN: ICD-10-PCS | Mod: 95,,, | Performed by: FAMILY MEDICINE

## 2022-01-26 PROCEDURE — 99214 OFFICE O/P EST MOD 30 MIN: CPT | Mod: 95,,, | Performed by: FAMILY MEDICINE

## 2022-01-26 RX ORDER — ESCITALOPRAM OXALATE 20 MG/1
20 TABLET ORAL DAILY
Qty: 30 TABLET | Refills: 11 | Status: SHIPPED | OUTPATIENT
Start: 2022-01-26 | End: 2022-04-18 | Stop reason: SDUPTHER

## 2022-01-26 NOTE — PROGRESS NOTES
[facility] - Clinic Note    Subjective    The patient location is: home   The chief complaint leading to consultation is: depression     Visit type: audiovisual    Face to Face time with patient: 7 minutes of total time spent on the encounter, which includes face to face time and non-face to face time preparing to see the patient (eg, review of tests), Obtaining and/or reviewing separately obtained history, Documenting clinical information in the electronic or other health record, Independently interpreting results (not separately reported) and communicating results to the patient/family/caregiver, or Care coordination (not separately reported).         Each patient to whom he or she provides medical services by telemedicine is:  (1) informed of the relationship between the physician and patient and the respective role of any other health care provider with respect to management of the patient; and (2) notified that he or she may decline to receive medical services by telemedicine and may withdraw from such care at any time.    Notes:     Ms. Gamble is a 31 y.o. female who presents for a VV.     Follow up for depression.   Admits to worsening depression.   Was started on lexapro about 2-3 months ago.   States that she initially did well with it but feels like it is wearing off.   Stress and depression is coming from her health conditions as well as financial constraints.     PM Dori has a past medical history of Anticoagulant long-term use, Anxiety, Depression, GERD (gastroesophageal reflux disease), Hypothyroidism, Migraine headache, and Stroke.   PSXH Dori has a past surgical history that includes Hysterectomy and  section.    Doir's family history includes Arthritis in her maternal grandmother; Depression in her mother and sister; Heart disease in her father and mother; Kidney disease in her sister; Stroke in her mother.    Dori reports that she quit smoking about 5 months ago. Her  smoking use included cigarettes. She has a 8.50 pack-year smoking history. She has never used smokeless tobacco. She reports current alcohol use. She reports previous drug use.   DAI Meadows is allergic to latex, natural rubber.   MAYANK Meadows has a current medication list which includes the following prescription(s): apixaban, atenolol, atorvastatin, escitalopram oxalate, nurtec, omeprazole, thyroid (pork), and trazodone.     Review of Systems   Constitutional: Positive for activity change and unexpected weight change.   HENT: Negative for hearing loss, rhinorrhea and trouble swallowing.    Eyes: Negative for discharge and visual disturbance.   Respiratory: Positive for chest tightness. Negative for wheezing.    Cardiovascular: Positive for palpitations. Negative for chest pain.   Gastrointestinal: Negative for blood in stool, constipation, diarrhea and vomiting.   Endocrine: Negative for polydipsia and polyuria.   Genitourinary: Negative for difficulty urinating, dysuria, hematuria and menstrual problem.   Musculoskeletal: Positive for arthralgias. Negative for joint swelling and neck pain.   Neurological: Positive for headaches. Negative for weakness.   Psychiatric/Behavioral: Positive for confusion and dysphoric mood.     Objective     There were no vitals taken for this visit.    Physical Exam   Constitutional:  Non-toxic appearance. She does not appear ill. No distress.   HENT:   Head: Normocephalic and atraumatic.   Right Ear: External ear normal.   Left Ear: External ear normal.   Eyes: Right eye exhibits no discharge. Left eye exhibits no discharge.   Pulmonary/Chest: Effort normal. No respiratory distress.   Speaks in complete sentences without notable SOB. No tachypnea.    Abdominal: Normal appearance.   Neurological: She is alert.   Skin: She is not diaphoretic.   Psychiatric: Her behavior is normal. Mood, judgment and thought content normal.      Assessment/Plan     Diagnoses and all orders for this  visit:    Severe episode of recurrent major depressive disorder, without psychotic features  -     EScitalopram oxalate (LEXAPRO) 20 MG tablet; Take 1 tablet (20 mg total) by mouth once daily.    Anxiety  -     EScitalopram oxalate (LEXAPRO) 20 MG tablet; Take 1 tablet (20 mg total) by mouth once daily.      -increase lexapro to 20mg and monitor for improvement.    Follow up in about 2 months (around 3/26/2022), or if symptoms worsen or fail to improve.    Future Appointments   Date Time Provider Department Center   3/8/2022  3:40 PM Feliciano Acosta MD [facility]       Feliciano Acosta MD  Family Medicine  [facility]

## 2022-03-29 ENCOUNTER — HOSPITAL ENCOUNTER (EMERGENCY)
Facility: HOSPITAL | Age: 32
Discharge: ANOTHER HOSPITAL | End: 2022-03-29
Attending: EMERGENCY MEDICINE

## 2022-03-29 ENCOUNTER — HOSPITAL ENCOUNTER (INPATIENT)
Facility: HOSPITAL | Age: 32
LOS: 3 days | Discharge: HOME | DRG: 024 | End: 2022-04-01
Attending: EMERGENCY MEDICINE | Admitting: PSYCHIATRY & NEUROLOGY

## 2022-03-29 VITALS
HEART RATE: 59 BPM | BODY MASS INDEX: 36.5 KG/M2 | HEIGHT: 63 IN | WEIGHT: 206 LBS | RESPIRATION RATE: 18 BRPM | OXYGEN SATURATION: 97 % | DIASTOLIC BLOOD PRESSURE: 57 MMHG | SYSTOLIC BLOOD PRESSURE: 102 MMHG

## 2022-03-29 DIAGNOSIS — I63.9 CEREBROVASCULAR ACCIDENT (CVA), UNSPECIFIED MECHANISM: Primary | ICD-10-CM

## 2022-03-29 DIAGNOSIS — I63.9 STROKE: ICD-10-CM

## 2022-03-29 DIAGNOSIS — I63.411 EMBOLIC STROKE INVOLVING RIGHT MIDDLE CEREBRAL ARTERY: Primary | ICD-10-CM

## 2022-03-29 DIAGNOSIS — I63.511 ACUTE ISCHEMIC RIGHT MCA STROKE: ICD-10-CM

## 2022-03-29 DIAGNOSIS — R41.82 AMS (ALTERED MENTAL STATUS): ICD-10-CM

## 2022-03-29 PROBLEM — G93.6 CYTOTOXIC CEREBRAL EDEMA: Status: ACTIVE | Noted: 2022-03-29

## 2022-03-29 PROBLEM — F44.7 FUNCTIONAL NEUROLOGICAL SYMPTOM DISORDER WITH MIXED SYMPTOMS: Status: ACTIVE | Noted: 2022-03-29

## 2022-03-29 LAB
ALBUMIN SERPL BCP-MCNC: 4.1 G/DL (ref 3.5–5.2)
ALLENS TEST: ABNORMAL
ALP SERPL-CCNC: 86 U/L (ref 55–135)
ALT SERPL W/O P-5'-P-CCNC: 27 U/L (ref 10–44)
ANION GAP SERPL CALC-SCNC: 10 MMOL/L (ref 8–16)
AST SERPL-CCNC: 22 U/L (ref 10–40)
BASOPHILS # BLD AUTO: 0.04 K/UL (ref 0–0.2)
BASOPHILS # BLD AUTO: 0.06 K/UL (ref 0–0.2)
BASOPHILS NFR BLD: 0.4 % (ref 0–1.9)
BASOPHILS NFR BLD: 0.5 % (ref 0–1.9)
BILIRUB SERPL-MCNC: 0.8 MG/DL (ref 0.1–1)
BUN SERPL-MCNC: 11 MG/DL (ref 6–20)
CALCIUM SERPL-MCNC: 9.3 MG/DL (ref 8.7–10.5)
CHLORIDE SERPL-SCNC: 108 MMOL/L (ref 95–110)
CO2 SERPL-SCNC: 21 MMOL/L (ref 23–29)
CREAT SERPL-MCNC: 0.9 MG/DL (ref 0.5–1.4)
DIFFERENTIAL METHOD: NORMAL
DIFFERENTIAL METHOD: NORMAL
EOSINOPHIL # BLD AUTO: 0.2 K/UL (ref 0–0.5)
EOSINOPHIL # BLD AUTO: 0.3 K/UL (ref 0–0.5)
EOSINOPHIL NFR BLD: 2.1 % (ref 0–8)
EOSINOPHIL NFR BLD: 2.2 % (ref 0–8)
ERYTHROCYTE [DISTWIDTH] IN BLOOD BY AUTOMATED COUNT: 12.7 % (ref 11.5–14.5)
ERYTHROCYTE [DISTWIDTH] IN BLOOD BY AUTOMATED COUNT: 12.9 % (ref 11.5–14.5)
EST. GFR  (AFRICAN AMERICAN): >60 ML/MIN/1.73 M^2
EST. GFR  (NON AFRICAN AMERICAN): >60 ML/MIN/1.73 M^2
GLUCOSE SERPL-MCNC: 103 MG/DL (ref 70–110)
HCO3 UR-SCNC: 23.8 MMOL/L (ref 24–28)
HCT VFR BLD AUTO: 43.3 % (ref 37–48.5)
HCT VFR BLD AUTO: 43.9 % (ref 37–48.5)
HGB BLD-MCNC: 14.6 G/DL (ref 12–16)
HGB BLD-MCNC: 14.7 G/DL (ref 12–16)
IMM GRANULOCYTES # BLD AUTO: 0.02 K/UL (ref 0–0.04)
IMM GRANULOCYTES # BLD AUTO: 0.03 K/UL (ref 0–0.04)
IMM GRANULOCYTES NFR BLD AUTO: 0.2 % (ref 0–0.5)
IMM GRANULOCYTES NFR BLD AUTO: 0.3 % (ref 0–0.5)
INR PPP: 1.1 (ref 0.8–1.2)
LYMPHOCYTES # BLD AUTO: 2.6 K/UL (ref 1–4.8)
LYMPHOCYTES # BLD AUTO: 3.7 K/UL (ref 1–4.8)
LYMPHOCYTES NFR BLD: 29 % (ref 18–48)
LYMPHOCYTES NFR BLD: 31 % (ref 18–48)
MCH RBC QN AUTO: 30.1 PG (ref 27–31)
MCH RBC QN AUTO: 30.2 PG (ref 27–31)
MCHC RBC AUTO-ENTMCNC: 33.3 G/DL (ref 32–36)
MCHC RBC AUTO-ENTMCNC: 33.9 G/DL (ref 32–36)
MCV RBC AUTO: 89 FL (ref 82–98)
MCV RBC AUTO: 91 FL (ref 82–98)
MONOCYTES # BLD AUTO: 0.6 K/UL (ref 0.3–1)
MONOCYTES # BLD AUTO: 0.8 K/UL (ref 0.3–1)
MONOCYTES NFR BLD: 6.2 % (ref 4–15)
MONOCYTES NFR BLD: 6.6 % (ref 4–15)
NEUTROPHILS # BLD AUTO: 5.6 K/UL (ref 1.8–7.7)
NEUTROPHILS # BLD AUTO: 7.1 K/UL (ref 1.8–7.7)
NEUTROPHILS NFR BLD: 59.5 % (ref 38–73)
NEUTROPHILS NFR BLD: 62 % (ref 38–73)
NRBC BLD-RTO: 0 /100 WBC
NRBC BLD-RTO: 0 /100 WBC
PCO2 BLDA: 33.9 MMHG (ref 35–45)
PH SMN: 7.45 [PH] (ref 7.35–7.45)
PLATELET # BLD AUTO: 198 K/UL (ref 150–450)
PLATELET # BLD AUTO: 198 K/UL (ref 150–450)
PMV BLD AUTO: 11.5 FL (ref 9.2–12.9)
PMV BLD AUTO: 11.6 FL (ref 9.2–12.9)
PO2 BLDA: 43 MMHG (ref 40–60)
POC BE: 0 MMOL/L
POC SATURATED O2: 82 % (ref 95–100)
POC TCO2: 25 MMOL/L (ref 24–29)
POCT GLUCOSE: 79 MG/DL (ref 70–110)
POTASSIUM SERPL-SCNC: 3.6 MMOL/L (ref 3.5–5.1)
PROT SERPL-MCNC: 7.6 G/DL (ref 6–8.4)
PROTHROMBIN TIME: 11 SEC (ref 9–12.5)
RBC # BLD AUTO: 4.83 M/UL (ref 4–5.4)
RBC # BLD AUTO: 4.88 M/UL (ref 4–5.4)
SAMPLE: ABNORMAL
SARS-COV-2 RDRP RESP QL NAA+PROBE: NEGATIVE
SITE: ABNORMAL
SODIUM SERPL-SCNC: 139 MMOL/L (ref 136–145)
T4 FREE SERPL-MCNC: 0.92 NG/DL (ref 0.71–1.51)
TSH SERPL DL<=0.005 MIU/L-ACNC: 9.95 UIU/ML (ref 0.4–4)
WBC # BLD AUTO: 11.9 K/UL (ref 3.9–12.7)
WBC # BLD AUTO: 8.98 K/UL (ref 3.9–12.7)

## 2022-03-29 PROCEDURE — 85025 COMPLETE CBC W/AUTO DIFF WBC: CPT | Mod: 91 | Performed by: EMERGENCY MEDICINE

## 2022-03-29 PROCEDURE — 71045 X-RAY EXAM CHEST 1 VIEW: CPT | Mod: 26,,, | Performed by: RADIOLOGY

## 2022-03-29 PROCEDURE — 81003 URINALYSIS AUTO W/O SCOPE: CPT | Performed by: PHYSICIAN ASSISTANT

## 2022-03-29 PROCEDURE — 25500020 PHARM REV CODE 255: Performed by: EMERGENCY MEDICINE

## 2022-03-29 PROCEDURE — 99285 EMERGENCY DEPT VISIT HI MDM: CPT | Mod: 25

## 2022-03-29 PROCEDURE — 71045 X-RAY EXAM CHEST 1 VIEW: CPT | Mod: TC,FY

## 2022-03-29 PROCEDURE — 99291 CRITICAL CARE FIRST HOUR: CPT | Mod: ,,, | Performed by: PHYSICIAN ASSISTANT

## 2022-03-29 PROCEDURE — 85610 PROTHROMBIN TIME: CPT | Performed by: EMERGENCY MEDICINE

## 2022-03-29 PROCEDURE — 70496 CTA HEAD AND NECK (XPD): ICD-10-PCS | Mod: 26,,, | Performed by: RADIOLOGY

## 2022-03-29 PROCEDURE — 93010 EKG 12-LEAD: ICD-10-PCS | Mod: ,,, | Performed by: INTERNAL MEDICINE

## 2022-03-29 PROCEDURE — 93010 ELECTROCARDIOGRAM REPORT: CPT | Mod: ,,, | Performed by: INTERNAL MEDICINE

## 2022-03-29 PROCEDURE — 70450 CT HEAD WITHOUT CONTRAST: ICD-10-PCS | Mod: 26,,, | Performed by: RADIOLOGY

## 2022-03-29 PROCEDURE — 20000000 HC ICU ROOM

## 2022-03-29 PROCEDURE — 70496 CT ANGIOGRAPHY HEAD: CPT | Mod: 26,,, | Performed by: RADIOLOGY

## 2022-03-29 PROCEDURE — G0426 PR INPT TELEHEALTH CONSULT 50M: ICD-10-PCS | Mod: GT,,, | Performed by: PSYCHIATRY & NEUROLOGY

## 2022-03-29 PROCEDURE — 99285 PR EMERGENCY DEPT VISIT,LEVEL V: ICD-10-PCS | Mod: ,,, | Performed by: EMERGENCY MEDICINE

## 2022-03-29 PROCEDURE — 84443 ASSAY THYROID STIM HORMONE: CPT | Performed by: EMERGENCY MEDICINE

## 2022-03-29 PROCEDURE — 93005 ELECTROCARDIOGRAM TRACING: CPT

## 2022-03-29 PROCEDURE — 85025 COMPLETE CBC W/AUTO DIFF WBC: CPT | Performed by: EMERGENCY MEDICINE

## 2022-03-29 PROCEDURE — 99223 1ST HOSP IP/OBS HIGH 75: CPT | Mod: 25,,, | Performed by: PSYCHIATRY & NEUROLOGY

## 2022-03-29 PROCEDURE — 63600175 PHARM REV CODE 636 W HCPCS: Performed by: PHYSICIAN ASSISTANT

## 2022-03-29 PROCEDURE — 70498 CTA HEAD AND NECK (XPD): ICD-10-PCS | Mod: 26,,, | Performed by: RADIOLOGY

## 2022-03-29 PROCEDURE — U0002 COVID-19 LAB TEST NON-CDC: HCPCS | Performed by: EMERGENCY MEDICINE

## 2022-03-29 PROCEDURE — 63600175 PHARM REV CODE 636 W HCPCS: Performed by: STUDENT IN AN ORGANIZED HEALTH CARE EDUCATION/TRAINING PROGRAM

## 2022-03-29 PROCEDURE — 99285 EMERGENCY DEPT VISIT HI MDM: CPT | Mod: 25,27

## 2022-03-29 PROCEDURE — 70498 CT ANGIOGRAPHY NECK: CPT | Mod: 26,,, | Performed by: RADIOLOGY

## 2022-03-29 PROCEDURE — 99223 PR INITIAL HOSPITAL CARE,LEVL III: ICD-10-PCS | Mod: 25,,, | Performed by: PSYCHIATRY & NEUROLOGY

## 2022-03-29 PROCEDURE — 99291 PR CRITICAL CARE, E/M 30-74 MINUTES: ICD-10-PCS | Mod: ,,, | Performed by: PHYSICIAN ASSISTANT

## 2022-03-29 PROCEDURE — 80053 COMPREHEN METABOLIC PANEL: CPT | Performed by: EMERGENCY MEDICINE

## 2022-03-29 PROCEDURE — G0426 INPT/ED TELECONSULT50: HCPCS | Mod: GT,,, | Performed by: PSYCHIATRY & NEUROLOGY

## 2022-03-29 PROCEDURE — 70450 CT HEAD/BRAIN W/O DYE: CPT | Mod: TC

## 2022-03-29 PROCEDURE — 99285 EMERGENCY DEPT VISIT HI MDM: CPT | Mod: ,,, | Performed by: EMERGENCY MEDICINE

## 2022-03-29 PROCEDURE — 70450 CT HEAD/BRAIN W/O DYE: CPT | Mod: 26,,, | Performed by: RADIOLOGY

## 2022-03-29 PROCEDURE — 84439 ASSAY OF FREE THYROXINE: CPT | Performed by: EMERGENCY MEDICINE

## 2022-03-29 PROCEDURE — 70496 CT ANGIOGRAPHY HEAD: CPT | Mod: TC

## 2022-03-29 PROCEDURE — 71045 XR CHEST 1 VIEW: ICD-10-PCS | Mod: 26,,, | Performed by: RADIOLOGY

## 2022-03-29 PROCEDURE — 82962 GLUCOSE BLOOD TEST: CPT

## 2022-03-29 PROCEDURE — 82803 BLOOD GASES ANY COMBINATION: CPT

## 2022-03-29 PROCEDURE — 99900035 HC TECH TIME PER 15 MIN (STAT)

## 2022-03-29 RX ORDER — ONDANSETRON 2 MG/ML
8 INJECTION INTRAMUSCULAR; INTRAVENOUS EVERY 8 HOURS PRN
Status: DISCONTINUED | OUTPATIENT
Start: 2022-03-29 | End: 2022-04-01 | Stop reason: HOSPADM

## 2022-03-29 RX ORDER — FENTANYL CITRATE 50 UG/ML
INJECTION, SOLUTION INTRAMUSCULAR; INTRAVENOUS CODE/TRAUMA/SEDATION MEDICATION
Status: COMPLETED | OUTPATIENT
Start: 2022-03-29 | End: 2022-03-29

## 2022-03-29 RX ORDER — ACETAMINOPHEN 325 MG/1
650 TABLET ORAL EVERY 6 HOURS PRN
Status: DISCONTINUED | OUTPATIENT
Start: 2022-03-29 | End: 2022-04-01 | Stop reason: HOSPADM

## 2022-03-29 RX ORDER — ASPIRIN 81 MG/1
81 TABLET ORAL DAILY
Status: DISCONTINUED | OUTPATIENT
Start: 2022-03-30 | End: 2022-03-30

## 2022-03-29 RX ORDER — SODIUM CHLORIDE 0.9 % (FLUSH) 0.9 %
10 SYRINGE (ML) INJECTION
Status: DISCONTINUED | OUTPATIENT
Start: 2022-03-29 | End: 2022-04-01 | Stop reason: HOSPADM

## 2022-03-29 RX ORDER — ATORVASTATIN CALCIUM 40 MG/1
40 TABLET, FILM COATED ORAL DAILY
Status: DISCONTINUED | OUTPATIENT
Start: 2022-03-30 | End: 2022-04-01 | Stop reason: HOSPADM

## 2022-03-29 RX ORDER — ONDANSETRON 2 MG/ML
4 INJECTION INTRAMUSCULAR; INTRAVENOUS
Status: ACTIVE | OUTPATIENT
Start: 2022-03-29 | End: 2022-03-30

## 2022-03-29 RX ORDER — HEPARIN SODIUM 5000 [USP'U]/ML
5000 INJECTION, SOLUTION INTRAVENOUS; SUBCUTANEOUS EVERY 8 HOURS
Status: DISCONTINUED | OUTPATIENT
Start: 2022-03-30 | End: 2022-03-30

## 2022-03-29 RX ADMIN — ONDANSETRON 8 MG: 2 INJECTION INTRAMUSCULAR; INTRAVENOUS at 10:03

## 2022-03-29 RX ADMIN — FENTANYL CITRATE 50 MCG: 50 INJECTION, SOLUTION INTRAMUSCULAR; INTRAVENOUS at 09:03

## 2022-03-29 RX ADMIN — IOHEXOL 100 ML: 350 INJECTION, SOLUTION INTRAVENOUS at 06:03

## 2022-03-29 RX ADMIN — IOHEXOL 40 ML: 300 INJECTION, SOLUTION INTRAVENOUS at 10:03

## 2022-03-29 NOTE — HPI
31F w/ baseline left sided weakness and tingling and peripheral vision loss, today acutely had a reduction in ability to speak after a very stressful fight with family members and falling onto the bed. She has had other episodes triggered by stress as well. 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 She is very stressed out right now and crying.

## 2022-03-29 NOTE — CONSULTS
[facility]  Vascular Neurology  Comprehensive Stroke Center  TeleVascular Neurology Acute Consultation Note      Consults    Consulting Provider: KARRIE HOANG  Current Providers  No providers found    Patient Location:  [facility] EMERGENCY DEPARTMENT Emergency Department  Spoke hospital nurse at bedside with patient assisting consultant.     Patient information was obtained from patient and spouse/SO.         Assessment/Plan:       Diagnoses:   Functional neurological symptom disorder with mixed symptoms  Hx of prior clinical strokes, some with negative workups per the notes available within Epic over past 2 years. Her neurology notes are not in system however it seems as though she was put on Eliquis for stroke prevention given the multiple nature of them - she had a weak IgM cardiolipin which should not be considered confirmatory for APLS, and apixban is not the standard treatment for this, so it still remains unclear why she is on this for stroke prevention.  She also has hx of depression / anxiety and multiple stroke-like episodes which have been triggered by stress, similar to today. She does have left PCA stroke on imaging in the absence of any considerable underlying small vessel disease or multi-infarct pattern or any infarct elsewhere for that matter. Therefore there is a significant mismatch between patient's clinical exam and her imaging - particularly in the chronic setting.  At this time her current symptoms are likely functional in nature and this was discussed with the patient. She may benefit from long term neuropsychological evaluation and management to help address these episodes and prevent further attacks in the future.  Would continue apixaban at this time and have her see her neurologist to continue to evaluate the need for continued long term anticoagulation in this setting.        STROKE DOCUMENTATION     Acute Stroke Times:   Acute Stroke Times   Last  "Known Normal Time: 1300  Symptom Onset Date: 03/29/22  Symptom Onset Time: 1300  Stroke Team Called Time: 1623  Stroke Team Arrival Time: 1624  CT Interpretation Time: 1628    NIH Scale:  1a. Level of Consciousness: 0-->Alert, keenly responsive  1b. LOC Questions: 0-->Answers both questions correctly  1c. LOC Commands: 0-->Performs both tasks correctly  2. Best Gaze: 0-->Normal  3. Visual: 0-->No visual loss  4. Facial Palsy: 3-->Complete paralysis of one or both sides (absence of facial movement in the upper and lower face)  5a. Motor Arm, Left: 1-->Drift, limb holds 90 (or 45) degrees, but drifts down before full 10 seconds, does not hit bed or other support  5b. Motor Arm, Right: 0-->No drift, limb holds 90 (or 45) degrees for full 10 secs  6a. Motor Leg, Left: 1-->Drift, leg falls by the end of the 5-sec period but does not hit bed  6b. Motor Leg, Right: 0-->No drift, leg holds 30 degree position for full 5 secs  7. Limb Ataxia: 0-->Absent  8. Sensory: 1-->Mild-to-moderate sensory loss, patient feels pinprick is less sharp or is dull on the affected side, or there is a loss of superficial pain with pinprick, but patient is aware of being touched  9. Best Language: 0-->No aphasia, normal  10. Dysarthria: 1-->Mild-to-moderate dysarthria, patient slurs at least some words and, at worst, can be understood with some difficulty  11. Extinction and Inattention (formerly Neglect): 0-->No abnormality  Total (NIH Stroke Scale): 7     Modified [place] Coma Scale:    ABCD2 Score:    [id number] Score:   HAS -BLED Score:   ICH Score:   Hunt & Melton Classification:       Blood pressure (!) 127/98, pulse 95, resp. rate 20, height 5' 3" (1.6 m), weight 93.4 kg (206 lb), SpO2 96 %.  Alteplase Eligible?: No  Alteplase Recommendation: Alteplase not recommended due to Full dose anticoagulation   Possible Interventional Revascularization Candidate? No; at this time symptoms not suggestive of large vessel " occlusion    Disposition Recommendation: pending further studies    Subjective:     History of Present Illness:  31F w/ baseline left sided weakness and tingling and peripheral vision loss, today acutely had a reduction in ability to speak after a very stressful fight with family members and falling onto the bed. She has had other episodes triggered by stress as well. 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 She is very stressed out right now and crying.        Woke up with symptoms?: yes    Recent bleeding noted: no  Does the patient take any Blood Thinners? yes  Medications: Anticoagulants:  apixaban/Eliquis      Past Medical History:   Past Medical History:   Diagnosis Date    Anticoagulant long-term use     Anxiety     Depression     GERD (gastroesophageal reflux disease)     Hypothyroidism     Migraine headache     Stroke        Past Surgical History: no major surgeries within the last 2 weeks    Family History: no relevant history    Social History: no smoking, no drinking, no drugs    Allergies: Latex, Natural Rubber No relevant allergies    Review of Systems   Constitutional: Negative for appetite change.   HENT: Negative for congestion.    Eyes: Negative for discharge.   Respiratory: Negative for shortness of breath.    Cardiovascular: Negative for chest pain.   Gastrointestinal: Negative for abdominal pain.   Endocrine: Negative for cold intolerance.   Genitourinary: Negative for difficulty urinating.   Musculoskeletal: Negative for joint swelling.   Skin: Negative for color change.     Objective:   Vitals:  /106, HR 88, o2 99, RR 18    CT READ: Yes  No hemmorhage. No mass effect. No early infarct signs.  Remote L PCA stroke    Physical Exam  Constitutional:       General: She is not in acute distress.  HENT:      Head: Normocephalic.      Right Ear: External ear normal.      Left Ear:  External ear normal.   Eyes:      Conjunctiva/sclera: Conjunctivae normal.   Pulmonary:      Effort: Pulmonary effort is normal.      Breath sounds: No stridor.   Abdominal:      Tenderness: There is no abdominal tenderness.   Musculoskeletal:      Cervical back: Normal range of motion.   Skin:     General: Skin is dry.      Findings: No rash.             The attending portion of this evaluation, treatment, and documentation was performed per Raheem Bartholomew MD via audiovisual.    In your opinion, this was a: Tier 1 Van Negative    Consult End Time: 4:51 PM     Raheem Bartholomew MD  [facility] Stroke [facility]  Vascular Neurology   [facility]

## 2022-03-29 NOTE — ASSESSMENT & PLAN NOTE
Hx of prior clinical strokes, some with negative workups per the notes available within Epic over past 2 years. Her neurology notes are not in system however it seems as though she was put on Eliquis for stroke prevention given the multiple nature of them - she had a weak IgM cardiolipin which should not be considered confirmatory for APLS, and apixban is not the standard treatment for this, so it still remains unclear why she is on this for stroke prevention.  She also has hx of depression / anxiety and multiple stroke-like episodes which have been triggered by stress, similar to today. She does have left PCA stroke on imaging in the absence of any considerable underlying small vessel disease or multi-infarct pattern or any infarct elsewhere for that matter. Therefore there is a significant mismatch between patient's clinical exam and her imaging - particularly in the chronic setting.  At this time her current symptoms are likely functional in nature and this was discussed with the patient. She may benefit from long term neuropsychological evaluation and management to help address these episodes and prevent further attacks in the future.  Would continue apixaban at this time and have her see her neurologist to continue to evaluate the need for continued long term anticoagulation in this setting.

## 2022-03-29 NOTE — SUBJECTIVE & OBJECTIVE
Woke up with symptoms?: yes    Recent bleeding noted: no  Does the patient take any Blood Thinners? yes  Medications: Anticoagulants:  apixaban/Eliquis      Past Medical History:   Past Medical History:   Diagnosis Date    Anticoagulant long-term use     Anxiety     Depression     GERD (gastroesophageal reflux disease)     Hypothyroidism     Migraine headache     Stroke        Past Surgical History: no major surgeries within the last 2 weeks    Family History: no relevant history    Social History: no smoking, no drinking, no drugs    Allergies: Latex, Natural Rubber No relevant allergies    Review of Systems   Constitutional: Negative for appetite change.   HENT: Negative for congestion.    Eyes: Negative for discharge.   Respiratory: Negative for shortness of breath.    Cardiovascular: Negative for chest pain.   Gastrointestinal: Negative for abdominal pain.   Endocrine: Negative for cold intolerance.   Genitourinary: Negative for difficulty urinating.   Musculoskeletal: Negative for joint swelling.   Skin: Negative for color change.     Objective:   Vitals:  /106, HR 88, o2 99, RR 18    CT READ: Yes  No hemmorhage. No mass effect. No early infarct signs.  Remote L PCA stroke    Physical Exam  Constitutional:       General: She is not in acute distress.  HENT:      Head: Normocephalic.      Right Ear: External ear normal.      Left Ear: External ear normal.   Eyes:      Conjunctiva/sclera: Conjunctivae normal.   Pulmonary:      Effort: Pulmonary effort is normal.      Breath sounds: No stridor.   Abdominal:      Tenderness: There is no abdominal tenderness.   Musculoskeletal:      Cervical back: Normal range of motion.   Skin:     General: Skin is dry.      Findings: No rash.

## 2022-03-29 NOTE — ED PROVIDER NOTES
Encounter Date: 3/29/2022       History     Chief Complaint   Patient presents with    Extremity Weakness     History of TIA, arrives POV accompanied by  who report their 8 year old child called him at work to tell him Mom is not acting right and unable to get words out. Pt on eliquis     31-year-old female brought from home via private vehicle for evaluation and treatment of stroke-like symptoms.  Patient has had numerous strokes the past and is currently taking Eliquis.  Evidently she was in an argument with her son about half an hour prior to arrival when she suddenly began having difficulty speaking and having difficulty standing with right-sided facial droop.  Upon arrival here, the patient is tearful and hyperventilating.  Stroke code initiated.        Review of patient's allergies indicates:   Allergen Reactions    Latex, natural rubber Rash     Past Medical History:   Diagnosis Date    Anticoagulant long-term use     Anxiety     Depression     GERD (gastroesophageal reflux disease)     Hypothyroidism     Migraine headache     Stroke      Past Surgical History:   Procedure Laterality Date     SECTION      HYSTERECTOMY       Family History   Problem Relation Age of Onset    Depression Mother     Heart disease Mother     Stroke Mother     Heart disease Father     Kidney disease Sister     Depression Sister     Arthritis Maternal Grandmother      Social History     Tobacco Use    Smoking status: Former Smoker     Packs/day: 0.50     Years: 17.00     Pack years: 8.50     Types: Cigarettes     Quit date: 2021     Years since quittin.5    Smokeless tobacco: Never Used   Substance Use Topics    Alcohol use: Yes     Comment: occ.     Drug use: Not Currently     Review of Systems   Unable to perform ROS: Acuity of condition   Constitutional: Negative for chills, fatigue and fever.   HENT: Negative for congestion, rhinorrhea and sore throat.    Eyes: Negative for  photophobia and pain.   Respiratory: Negative for cough, chest tightness, shortness of breath and wheezing.    Cardiovascular: Negative for chest pain and palpitations.   Gastrointestinal: Negative for abdominal distention, constipation, diarrhea and nausea.   Endocrine: Negative for polydipsia and polyuria.   Genitourinary: Negative for difficulty urinating, dysuria, flank pain, frequency and urgency.   Musculoskeletal: Negative for back pain, myalgias, neck pain and neck stiffness.   Skin: Negative for pallor and rash.   Neurological: Positive for weakness and numbness. Negative for dizziness and headaches.        Patient has decreased sensation and weakness of the musculature on the right side of face.  Her speech is more slurred than usual according to the .  She was having some aphasia as well earlier but this seems to have cleared a bit.  I do not detect any weakness of the extremities beyond her normal.       Physical Exam     Initial Vitals [03/29/22 1615]   BP Pulse Resp Temp SpO2   (!) 127/98 95 20 -- 96 %      MAP       --         Physical Exam    Nursing note and vitals reviewed.  Constitutional: She appears well-developed and well-nourished. She is not diaphoretic. No distress.   HENT:   Head: Normocephalic and atraumatic.   Nose: Nose normal.   Mouth/Throat: Oropharynx is clear and moist. No oropharyngeal exudate.   Eyes: Conjunctivae and EOM are normal. Pupils are equal, round, and reactive to light. No scleral icterus.   Neck: Neck supple. No JVD present.   Normal range of motion.  Cardiovascular: Normal rate, regular rhythm, normal heart sounds and intact distal pulses.   No murmur heard.  Pulmonary/Chest: Breath sounds normal. No stridor. No respiratory distress. She has no wheezes.   Abdominal: Abdomen is soft. Bowel sounds are normal. She exhibits no distension. There is no abdominal tenderness.   Musculoskeletal:         General: No edema. Normal range of motion.      Cervical back:  Normal range of motion and neck supple.     Neurological: She is alert and oriented to person, place, and time. She has normal strength. No sensory deficit. GCS score is 15. GCS eye subscore is 4. GCS verbal subscore is 5. GCS motor subscore is 6.   Patient is having trouble speaking, and she has a left-sided facial droop.   Skin: Skin is warm and dry. Capillary refill takes less than 2 seconds. No rash noted. No erythema.   Psychiatric:   Very anxious, tearful         ED Course   Procedures  Labs Reviewed   CBC W/ AUTO DIFFERENTIAL   COMPREHENSIVE METABOLIC PANEL   DRUG SCREEN PANEL, URINE EMERGENCY   URINALYSIS, REFLEX TO URINE CULTURE   POCT GLUCOSE MONITORING CONTINUOUS     EKG Readings: (Independently Interpreted)   EKG, personally reviewed by me shows normal sinus rhythm at 91 beats per minute.  NJ interval 162, . No obvious ST changes.         Imaging Results          X-Ray Chest 1 View (In process)  Result time 03/29/22 17:13:03               CT head (if fall & altered, LOC>2, on Warfarin or other anticoagulants) (Final result)  Result time 03/29/22 16:31:24    Final result by Henry Salgado MD (03/29/22 16:31:24)                 Impression:      1. No acute intracranial abnormality.  2. 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      Electronically signed by: Henry Salgado  Date:    03/29/2022  Time:    16:31             Narrative:    EXAMINATION:  CT HEAD WITHOUT CONTRAST    CLINICAL HISTORY:  Altered mental status, nontraumatic (Ped 0-18y);    TECHNIQUE:  Low dose axial images were obtained through the head.  Coronal and sagittal reformations were also performed. Contrast was not administered.    COMPARISON:  MRI 09/13/2021.  CT 06/23/2021.    FINDINGS:  There is no acute hemorrhage or infarction.  There is a normal pattern of gray-white matter differentiation.  Chronic focal encephalomalacia of the medial left occipital lobe.    No extra-axial fluid collections.  Ventricles  are normal in size, shape and configuration.  The basal cisterns are patent.    The imaged paranasal sinuses and ethmoid air cells are well aerated.    The mastoid air cells and middle ears are normally pneumatized.                                 Medications - No data to display  Medical Decision Making:   Differential Diagnosis:   TIA, CVA, inversion, anxiety, etc.  ED Management:  Code stroke was called, the patient was interviewed by Dr. Roger  via telemetry.  He suspects that her symptoms today could be explained by anxiety and conversion disorder, but with her significant history of strokes etc., he believes that she will need further study.  CT angiogram of the head and neck has been performed, and the does appear to be an occlusion which may be treatable.  I spoke to Dr. Rodriguez regarding these findings, and he will accept the patient at the main campus.  Spoke to patient and she agrees with this plan of care.  Patient will be transported via helicopter.                      Clinical Impression:   Final diagnoses:  [R41.82] AMS (altered mental status)                 Carlos Martinez MD  03/29/22 1922

## 2022-03-30 LAB
ABO + RH BLD: NORMAL
ALBUMIN SERPL BCP-MCNC: 3.9 G/DL (ref 3.5–5.2)
ALP SERPL-CCNC: 80 U/L (ref 55–135)
ALT SERPL W/O P-5'-P-CCNC: 26 U/L (ref 10–44)
ANION GAP SERPL CALC-SCNC: 11 MMOL/L (ref 8–16)
ASCENDING AORTA: 2.32 CM
AST SERPL-CCNC: 21 U/L (ref 10–40)
AV INDEX (PROSTH): 0.62
AV MEAN GRADIENT: 4 MMHG
AV PEAK GRADIENT: 7 MMHG
AV VALVE AREA: 1.92 CM2
AV VELOCITY RATIO: 0.62
BASOPHILS # BLD AUTO: 0.03 K/UL (ref 0–0.2)
BASOPHILS NFR BLD: 0.3 % (ref 0–1.9)
BILIRUB SERPL-MCNC: 1 MG/DL (ref 0.1–1)
BILIRUB UR QL STRIP: NEGATIVE
BLD GP AB SCN CELLS X3 SERPL QL: NORMAL
BSA FOR ECHO PROCEDURE: 2.04 M2
BUN SERPL-MCNC: 9 MG/DL (ref 6–20)
CALCIUM SERPL-MCNC: 9.5 MG/DL (ref 8.7–10.5)
CHLORIDE SERPL-SCNC: 106 MMOL/L (ref 95–110)
CHOLEST SERPL-MCNC: 159 MG/DL (ref 120–199)
CHOLEST/HDLC SERPL: 2.9 {RATIO} (ref 2–5)
CLARITY UR REFRACT.AUTO: CLEAR
CO2 SERPL-SCNC: 21 MMOL/L (ref 23–29)
COLOR UR AUTO: YELLOW
CREAT SERPL-MCNC: 0.8 MG/DL (ref 0.5–1.4)
CV ECHO LV RWT: 0.47 CM
DIFFERENTIAL METHOD: NORMAL
DOP CALC AO PEAK VEL: 1.3 M/S
DOP CALC AO VTI: 27.31 CM
DOP CALC LVOT AREA: 3.1 CM2
DOP CALC LVOT DIAMETER: 1.99 CM
DOP CALC LVOT PEAK VEL: 0.81 M/S
DOP CALC LVOT STROKE VOLUME: 52.41 CM3
DOP CALCLVOT PEAK VEL VTI: 16.86 CM
E WAVE DECELERATION TIME: 200.61 MSEC
E/A RATIO: 1.21
E/E' RATIO: 8.4 M/S
ECHO LV POSTERIOR WALL: 0.9 CM (ref 0.6–1.1)
EJECTION FRACTION: 65 %
EOSINOPHIL # BLD AUTO: 0.2 K/UL (ref 0–0.5)
EOSINOPHIL NFR BLD: 1.5 % (ref 0–8)
ERYTHROCYTE [DISTWIDTH] IN BLOOD BY AUTOMATED COUNT: 12.8 % (ref 11.5–14.5)
EST. GFR  (AFRICAN AMERICAN): >60 ML/MIN/1.73 M^2
EST. GFR  (NON AFRICAN AMERICAN): >60 ML/MIN/1.73 M^2
ESTIMATED AVG GLUCOSE: 85 MG/DL (ref 68–131)
FRACTIONAL SHORTENING: 24 % (ref 28–44)
GLUCOSE SERPL-MCNC: 91 MG/DL (ref 70–110)
GLUCOSE UR QL STRIP: NEGATIVE
HBA1C MFR BLD: 4.6 % (ref 4–5.6)
HCT VFR BLD AUTO: 43.4 % (ref 37–48.5)
HDLC SERPL-MCNC: 54 MG/DL (ref 40–75)
HDLC SERPL: 34 % (ref 20–50)
HGB BLD-MCNC: 14.2 G/DL (ref 12–16)
HGB UR QL STRIP: NEGATIVE
IMM GRANULOCYTES # BLD AUTO: 0.03 K/UL (ref 0–0.04)
IMM GRANULOCYTES NFR BLD AUTO: 0.3 % (ref 0–0.5)
INTERVENTRICULAR SEPTUM: 1.08 CM (ref 0.6–1.1)
KETONES UR QL STRIP: ABNORMAL
LA MAJOR: 4.93 CM
LA MINOR: 4.9 CM
LA WIDTH: 3.39 CM
LDLC SERPL CALC-MCNC: 87.8 MG/DL (ref 63–159)
LEFT ATRIUM SIZE: 3.1 CM
LEFT ATRIUM VOLUME INDEX: 22.4 ML/M2
LEFT ATRIUM VOLUME: 43.9 CM3
LEFT INTERNAL DIMENSION IN SYSTOLE: 2.94 CM (ref 2.1–4)
LEFT VENTRICLE DIASTOLIC VOLUME INDEX: 32.68 ML/M2
LEFT VENTRICLE DIASTOLIC VOLUME: 64.05 ML
LEFT VENTRICLE MASS INDEX: 60 G/M2
LEFT VENTRICLE SYSTOLIC VOLUME INDEX: 17 ML/M2
LEFT VENTRICLE SYSTOLIC VOLUME: 33.33 ML
LEFT VENTRICULAR INTERNAL DIMENSION IN DIASTOLE: 3.85 CM (ref 3.5–6)
LEFT VENTRICULAR MASS: 117.99 G
LEUKOCYTE ESTERASE UR QL STRIP: NEGATIVE
LV LATERAL E/E' RATIO: 7.5 M/S
LV SEPTAL E/E' RATIO: 9.55 M/S
LYMPHOCYTES # BLD AUTO: 2.2 K/UL (ref 1–4.8)
LYMPHOCYTES NFR BLD: 21.9 % (ref 18–48)
MAGNESIUM SERPL-MCNC: 1.8 MG/DL (ref 1.6–2.6)
MCH RBC QN AUTO: 29.8 PG (ref 27–31)
MCHC RBC AUTO-ENTMCNC: 32.7 G/DL (ref 32–36)
MCV RBC AUTO: 91 FL (ref 82–98)
MONOCYTES # BLD AUTO: 0.6 K/UL (ref 0.3–1)
MONOCYTES NFR BLD: 5.8 % (ref 4–15)
MV PEAK A VEL: 0.87 M/S
MV PEAK E VEL: 1.05 M/S
MV STENOSIS PRESSURE HALF TIME: 58.18 MS
MV VALVE AREA P 1/2 METHOD: 3.78 CM2
NEUTROPHILS # BLD AUTO: 6.9 K/UL (ref 1.8–7.7)
NEUTROPHILS NFR BLD: 70.2 % (ref 38–73)
NITRITE UR QL STRIP: NEGATIVE
NONHDLC SERPL-MCNC: 105 MG/DL
NRBC BLD-RTO: 0 /100 WBC
PH UR STRIP: 5 [PH] (ref 5–8)
PHOSPHATE SERPL-MCNC: 2.7 MG/DL (ref 2.7–4.5)
PLATELET # BLD AUTO: 201 K/UL (ref 150–450)
PMV BLD AUTO: 12.6 FL (ref 9.2–12.9)
POCT GLUCOSE: 81 MG/DL (ref 70–110)
POCT GLUCOSE: 89 MG/DL (ref 70–110)
POTASSIUM SERPL-SCNC: 3.9 MMOL/L (ref 3.5–5.1)
PROT SERPL-MCNC: 7.2 G/DL (ref 6–8.4)
PROT UR QL STRIP: NEGATIVE
RA MAJOR: 4.45 CM
RA PRESSURE: 3 MMHG
RA WIDTH: 3.06 CM
RBC # BLD AUTO: 4.76 M/UL (ref 4–5.4)
RIGHT VENTRICULAR END-DIASTOLIC DIMENSION: 3.18 CM
SINUS: 2.16 CM
SODIUM SERPL-SCNC: 138 MMOL/L (ref 136–145)
SP GR UR STRIP: 1.01 (ref 1–1.03)
STJ: 2.01 CM
TDI LATERAL: 0.14 M/S
TDI SEPTAL: 0.11 M/S
TDI: 0.13 M/S
TRICUSPID ANNULAR PLANE SYSTOLIC EXCURSION: 2.02 CM
TRIGL SERPL-MCNC: 86 MG/DL (ref 30–150)
URN SPEC COLLECT METH UR: ABNORMAL
WBC # BLD AUTO: 9.88 K/UL (ref 3.9–12.7)

## 2022-03-30 PROCEDURE — 83036 HEMOGLOBIN GLYCOSYLATED A1C: CPT | Performed by: PHYSICIAN ASSISTANT

## 2022-03-30 PROCEDURE — 99233 PR SUBSEQUENT HOSPITAL CARE,LEVL III: ICD-10-PCS | Mod: ,,, | Performed by: NURSE PRACTITIONER

## 2022-03-30 PROCEDURE — 25000003 PHARM REV CODE 250: Performed by: PHYSICIAN ASSISTANT

## 2022-03-30 PROCEDURE — 86850 RBC ANTIBODY SCREEN: CPT | Performed by: PHYSICIAN ASSISTANT

## 2022-03-30 PROCEDURE — 97165 OT EVAL LOW COMPLEX 30 MIN: CPT

## 2022-03-30 PROCEDURE — 97535 SELF CARE MNGMENT TRAINING: CPT

## 2022-03-30 PROCEDURE — 97530 THERAPEUTIC ACTIVITIES: CPT

## 2022-03-30 PROCEDURE — 80053 COMPREHEN METABOLIC PANEL: CPT | Performed by: PHYSICIAN ASSISTANT

## 2022-03-30 PROCEDURE — 83735 ASSAY OF MAGNESIUM: CPT | Performed by: PHYSICIAN ASSISTANT

## 2022-03-30 PROCEDURE — 25000003 PHARM REV CODE 250: Performed by: NURSE PRACTITIONER

## 2022-03-30 PROCEDURE — 99233 SBSQ HOSP IP/OBS HIGH 50: CPT | Mod: ,,, | Performed by: PSYCHIATRY & NEUROLOGY

## 2022-03-30 PROCEDURE — 84100 ASSAY OF PHOSPHORUS: CPT | Performed by: PHYSICIAN ASSISTANT

## 2022-03-30 PROCEDURE — 36415 COLL VENOUS BLD VENIPUNCTURE: CPT | Performed by: PSYCHIATRY & NEUROLOGY

## 2022-03-30 PROCEDURE — 20000000 HC ICU ROOM

## 2022-03-30 PROCEDURE — 94761 N-INVAS EAR/PLS OXIMETRY MLT: CPT

## 2022-03-30 PROCEDURE — 97161 PT EVAL LOW COMPLEX 20 MIN: CPT

## 2022-03-30 PROCEDURE — 99233 PR SUBSEQUENT HOSPITAL CARE,LEVL III: ICD-10-PCS | Mod: ,,, | Performed by: PSYCHIATRY & NEUROLOGY

## 2022-03-30 PROCEDURE — 85025 COMPLETE CBC W/AUTO DIFF WBC: CPT | Performed by: PHYSICIAN ASSISTANT

## 2022-03-30 PROCEDURE — 92610 EVALUATE SWALLOWING FUNCTION: CPT

## 2022-03-30 PROCEDURE — 63600175 PHARM REV CODE 636 W HCPCS: Performed by: PHYSICIAN ASSISTANT

## 2022-03-30 PROCEDURE — 99233 SBSQ HOSP IP/OBS HIGH 50: CPT | Mod: ,,, | Performed by: NURSE PRACTITIONER

## 2022-03-30 PROCEDURE — 80061 LIPID PANEL: CPT | Performed by: PHYSICIAN ASSISTANT

## 2022-03-30 RX ORDER — AMOXICILLIN 250 MG
1 CAPSULE ORAL DAILY
Status: DISCONTINUED | OUTPATIENT
Start: 2022-03-30 | End: 2022-04-01 | Stop reason: HOSPADM

## 2022-03-30 RX ORDER — CLOPIDOGREL BISULFATE 75 MG/1
75 TABLET ORAL DAILY
Status: DISCONTINUED | OUTPATIENT
Start: 2022-03-31 | End: 2022-04-01 | Stop reason: HOSPADM

## 2022-03-30 RX ORDER — THYROID 15 MG/1
30 TABLET ORAL
Status: DISCONTINUED | OUTPATIENT
Start: 2022-03-30 | End: 2022-04-01 | Stop reason: HOSPADM

## 2022-03-30 RX ORDER — NAPROXEN SODIUM 220 MG/1
81 TABLET, FILM COATED ORAL DAILY
Status: DISCONTINUED | OUTPATIENT
Start: 2022-03-31 | End: 2022-04-01 | Stop reason: HOSPADM

## 2022-03-30 RX ORDER — ESCITALOPRAM OXALATE 20 MG/1
20 TABLET ORAL DAILY
Status: DISCONTINUED | OUTPATIENT
Start: 2022-03-30 | End: 2022-04-01 | Stop reason: HOSPADM

## 2022-03-30 RX ORDER — PANTOPRAZOLE SODIUM 20 MG/1
40 TABLET, DELAYED RELEASE ORAL DAILY
Refills: 11 | Status: DISCONTINUED | OUTPATIENT
Start: 2022-03-30 | End: 2022-04-01 | Stop reason: HOSPADM

## 2022-03-30 RX ADMIN — ACETAMINOPHEN 650 MG: 325 TABLET ORAL at 12:03

## 2022-03-30 RX ADMIN — SENNOSIDES AND DOCUSATE SODIUM 1 TABLET: 50; 8.6 TABLET ORAL at 10:03

## 2022-03-30 RX ADMIN — HEPARIN SODIUM 5000 UNITS: 5000 INJECTION INTRAVENOUS; SUBCUTANEOUS at 05:03

## 2022-03-30 RX ADMIN — THYROID, PORCINE 30 MG: 30 TABLET ORAL at 05:03

## 2022-03-30 RX ADMIN — APIXABAN 5 MG: 5 TABLET, FILM COATED ORAL at 09:03

## 2022-03-30 RX ADMIN — ATORVASTATIN CALCIUM 40 MG: 20 TABLET, FILM COATED ORAL at 08:03

## 2022-03-30 RX ADMIN — PANTOPRAZOLE SODIUM 40 MG: 40 TABLET, DELAYED RELEASE ORAL at 08:03

## 2022-03-30 RX ADMIN — ASPIRIN 81 MG: 81 TABLET, COATED ORAL at 08:03

## 2022-03-30 RX ADMIN — ESCITALOPRAM OXALATE 20 MG: 10 TABLET ORAL at 08:03

## 2022-03-30 RX ADMIN — ACETAMINOPHEN 650 MG: 325 TABLET ORAL at 02:03

## 2022-03-30 RX ADMIN — ONDANSETRON 8 MG: 2 INJECTION INTRAMUSCULAR; INTRAVENOUS at 02:03

## 2022-03-30 RX ADMIN — APIXABAN 5 MG: 5 TABLET, FILM COATED ORAL at 10:03

## 2022-03-30 NOTE — SUBJECTIVE & OBJECTIVE
Past Medical History:   Diagnosis Date    Anticoagulant long-term use     Anxiety     Depression     GERD (gastroesophageal reflux disease)     Hypothyroidism     Migraine headache     Stroke      Past Surgical History:   Procedure Laterality Date     SECTION      HYSTERECTOMY       Family History   Problem Relation Age of Onset    Depression Mother     Heart disease Mother     Stroke Mother     Heart disease Father     Kidney disease Sister     Depression Sister     Arthritis Maternal Grandmother      Social History     Tobacco Use    Smoking status: Former Smoker     Packs/day: 0.50     Years: 17.00     Pack years: 8.50     Types: Cigarettes     Quit date: 2021     Years since quittin.5    Smokeless tobacco: Never Used   Substance Use Topics    Alcohol use: Yes     Comment: occ.     Drug use: Not Currently     Review of patient's allergies indicates:   Allergen Reactions    Latex, natural rubber Rash       Medications: I have reviewed the current medication administration record.    (Not in a hospital admission)      Review of Systems   Constitutional:  Negative for fever.   HENT:  Negative for drooling.    Eyes:  Negative for visual disturbance.   Respiratory:  Negative for cough.    Cardiovascular:  Negative for chest pain.   Gastrointestinal:  Negative for nausea and vomiting.   Genitourinary:  Negative for dysuria.   Musculoskeletal:  Negative for arthralgias and myalgias.   Skin:  Negative for rash.   Neurological:  Positive for facial asymmetry, speech difficulty, weakness and numbness.   Psychiatric/Behavioral:  Negative for agitation. The patient is nervous/anxious.    Objective:     Vital Signs (Most Recent):  Temp: 99.5 °F (37.5 °C) (22)  Pulse: 66 (22)  Resp: 18 (22)  BP: (!) 106/55 (22)  SpO2: 97 % (22)    Vital Signs Range (Last 24H):  Temp:  [99.5 °F (37.5 °C)]   Pulse:  [59-95]   Resp:  [17-22]   BP: ()/()    SpO2:  [96 %-100 %]     Physical Exam  Vitals reviewed.   Constitutional:       General: She is not in acute distress.     Appearance: She is well-developed.   HENT:      Head: Normocephalic and atraumatic.      Right Ear: External ear normal.      Left Ear: External ear normal.      Nose: Nose normal.   Eyes:      General: No scleral icterus.  Cardiovascular:      Rate and Rhythm: Normal rate.   Pulmonary:      Effort: Pulmonary effort is normal. No respiratory distress.   Abdominal:      General: There is no distension.   Musculoskeletal:      Cervical back: Normal range of motion.      Right lower leg: No edema.      Left lower leg: No edema.   Skin:     General: Skin is warm and dry.   Neurological:      Mental Status: She is alert.   Psychiatric:         Mood and Affect: Mood is anxious. Affect is tearful.       Neurological Exam:   LOC: alert  Attention Span: Good   Language: No aphasia  Articulation: Dysarthria  Orientation: Person, Place, Time   Visual Fields: Full  EOM (CN III, IV, VI): Full/intact  Facial Movement (CN VII): Lower facial weakness on the Left  Motor: Arm left  Paresis: 4/5  Leg left  Normal 5/5  Arm right  Normal 5/5  Leg right Normal 5/5  Sensation: Perry-anesthesia left  Tone: Normal tone throughout      Laboratory:  CMP:   Recent Labs   Lab 03/29/22  1709   CALCIUM 9.3   ALBUMIN 4.1   PROT 7.6      K 3.6   CO2 21*      BUN 11   CREATININE 0.9   ALKPHOS 86   ALT 27   AST 22   BILITOT 0.8     CBC:   Recent Labs   Lab 03/29/22  1709   WBC 8.98   RBC 4.88   HGB 14.7   HCT 43.3      MCV 89   MCH 30.1   MCHC 33.9     Lipid Panel: No results for input(s): CHOL, LDLCALC, HDL, TRIG in the last 168 hours.  Coagulation: No results for input(s): PT, INR, APTT in the last 168 hours.  Hgb A1C: No results for input(s): HGBA1C in the last 168 hours.  TSH: No results for input(s): TSH in the last 168 hours.    Diagnostic Results:      Brain imaging:  CT Head. Date: 03/29/22  2051  Suspected early ischemic changes involving the right frontal lobe in this patient with suspected small branch vessel occlusion involving the anterior right MCA on prior outside CTA.     No acute intracranial hemorrhage    CT Head. Date: 03/29/22 1624  1. No acute intracranial abnormality.  2. 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       Vessel Imaging:  CTA Head and Neck OSH. Date: 03/29/22  R M2/M3 occlusion    Cardiac Evaluation:   TTE with bubble

## 2022-03-30 NOTE — PT/OT/SLP EVAL
Occupational Therapy   Evaluation    Name: Dori Gamble  MRN: [id number]  Admitting Diagnosis:  Embolic stroke involving right middle cerebral artery  Recent Surgery: * No surgery found *      Recommendations:     Discharge Recommendations: home health PT, home health OT  Discharge Equipment Recommendations:  none  Barriers to discharge:  None    Assessment:     Dori Gamble is a 31 y.o. female with a medical diagnosis of Embolic stroke involving right middle cerebral artery.  She presents with the following performance deficits affecting function: weakness, impaired endurance, impaired sensation, impaired self care skills, impaired functional mobilty, impaired balance, gait instability, decreased upper extremity function, decreased lower extremity function, impaired fine motor.      Patient participated well with therapy, presented with some nausea with direction change during mobility on this date, stand by assist for bed mobility, contact guard assist for mobility within room; patient reporting some residual deficits from previous stroke, and R side facial numbness; presents with good safety awareness and insight, will continue to benefit from therapy during this admission, OT POC initiated.    Rehab Prognosis: Good; patient would benefit from acute skilled OT services to address these deficits and reach maximum level of function.       Plan:     Patient to be seen 4 x/week to address the above listed problems via self-care/home management, therapeutic activities, therapeutic exercises, neuromuscular re-education  · Plan of Care Expires: 04/30/22  · Plan of Care Reviewed with: patient, spouse    Subjective     Chief Complaint: Nausea   Patient/Family Comments/goals: Return home and to [facility]    Occupational Profile:  Living Environment: Pt lives with spouse and 2 children (11, 8) in [facility] with 4STE and B HR.  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  Patient found supine with blood pressure cuff, pulse ox (continuous), telemetry upon OT entry to room.    General Precautions: Standard, aspiration, fall, NPO   Orthopedic Precautions:N/A   Braces: N/A  Respiratory Status: Room air    Occupational Performance:    Bed Mobility:    · Patient completed Rolling/Turning to Right with stand by assistance  · Patient completed Scooting/Bridging with stand by assistance  · Patient completed Supine to Sit with stand by assistance    Functional Mobility/Transfers:  · Patient completed Sit <> Stand Transfer with contact guard assistance  with  hand-held assist   · Patient completed Bed <> Chair Transfer using Step Transfer technique with contact guard assistance with hand-held assist  · Functional Mobility: CGA with bilateral HHA from edge of bed to window to bedside chair; slow and wide SINDHU, patient reporting near baseline aside from feelings of neausea     Activities of Daily Living:  · Grooming: supervision seated in bedside chair  · Upper Body Dressing: supervision seated in bedside chair  · Lower Body Dressing: supervision seated in bedside chair    Cognitive/Visual Perceptual:  Cognitive/Psychosocial Skills:     -       Oriented to: Person, Place, Time and Situation   -       Follows Commands/attention:Follows multistep  commands  -       Safety awareness/insight to disability: intact   -       Mood/Affect/Coping skills/emotional control: Appropriate to situation and  Cooperative    Physical Exam:  Postural examination/scapula alignment:    -       No postural abnormalities identified  Upper Extremity Range of Motion:     -       Right Upper Extremity: WFL  -       Left Upper Extremity: WFL  Upper Extremity Strength:    -       Right Upper Extremity: WFL  -       Left Upper Extremity: WFL     AMPAC 6 Click ADL:  AMPAC Total Score: 21    Treatment & Education:  -Evaluation completed, plan of care established.  -Patient and family educated on roles/goals of OT and POC.  -White board updated.  -Therapist provided time for questions and answered within scope of practice.  -Patient educated on importance of EOB/OOB activity to maximize recovery.  Education:    Patient left up in chair with all lines intact, call button in reach, nursing notified and patient's  present    GOALS:   Multidisciplinary Problems     Occupational Therapy Goals        Problem: Occupational Therapy    Goal Priority Disciplines Outcome Interventions   Occupational Therapy Goal     OT, PT/OT Ongoing, Progressing    Description: Goals to be met by: 22     Patient will increase functional independence with ADLs by performing:    UE Dressing with Modified [place].  LE Dressing with Modified [place].  Grooming while standing at sink with Modified [place].  Toileting from toilet with Modified [place] for hygiene and clothing management.   Supine to sit with Modified [place].  Step transfer with Modified [place]  Toilet transfer to toilet with Modified [place].                     History:     Past Medical History:   Diagnosis Date    Anticoagulant long-term use     Anxiety     Depression     GERD (gastroesophageal reflux disease)     Hypothyroidism     Migraine headache     Stroke        Past Surgical History:   Procedure Laterality Date     SECTION      HYSTERECTOMY         Time Tracking:     OT Date of Treatment: 22  OT Start Time: 849  OT Stop  Time: 0908  OT Total Time (min): 19 min    Billable Minutes:Evaluation 9  Self Care/Home Management 10    3/30/2022

## 2022-03-30 NOTE — ASSESSMENT & PLAN NOTE
32 y/o female with h/o previous CVAs on apixaban (unknown reason), TBI, and hypothyroidism presents with speech difficulty, found to have R M2 occlusion and is now s/p mechanical thrombectomy of RM2 occlusion with TICI 3 reperfusion.   - Admit to NCC  - Vascular Neurology following  - SBP goal 100-140  - MRI tonight to evaluate stroke burden, if no hemorrhagic conversion, will restart home anticoagulation   - TSH elevated, free t4 WNL   - Lipid panel + Hemoglobin A1c pending  - Hourly neuro checks  - Post-thrombectomy RN protocol   - Begin daily statin  - Echo pending   - PT/OT/SLP in AM  3/30/2022: start Eliquis

## 2022-03-30 NOTE — PLAN OF CARE
Pt arrived to IR room 4 for thrombectomy, no acute distress noted. Orders, consent and labs reviewed on chart. ED RN at bedside.

## 2022-03-30 NOTE — ASSESSMENT & PLAN NOTE
Area of cerebral edema identified when reviewing brain imaging in the territory of the R middle cerebral artery. We will continue to monitor with q4h neuro checks while on floor or more frequently while in neuro critical care, as any change in the patients clinical exam may signify expansion of the insult and/or the area of the edema. Such changes may require acute interventions to prevent loss of function and/or death.
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
## 2022-03-30 NOTE — PT/OT/SLP EVAL
"Physical Therapy Co-Evaluation/Treatment     Patient Name:  Dori Gamble   MRN:  [id number]    Recommendations:     Discharge Recommendations:  home health PT, home health OT   Discharge Equipment Recommendations: none   Barriers to discharge: Inaccessible home (4STE BHR) and Decreased caregiver support     Assessment:     Dori Gamble is a 31 y.o. female admitted with a medical diagnosis of Embolic stroke involving right middle cerebral artery.  She presents with the following impairments/functional limitations:  weakness, impaired endurance, impaired sensation, impaired self care skills, impaired functional mobilty, impaired balance, gait instability, decreased upper extremity function, decreased lower extremity function, pain, impaired fine motor. Pt completed bed mobility with SBA.. Pt amb with proper postural alignment, but required CGA and HHAx2 for instability, weakness, and impaired endurance. Pt had slurred speech throughout the session and reported nausea with mobility. Pt reported increase in headache Sx with cervical rotation to the R. Pt may benefit from trial with RW in future session. Pt would continue to benefit from skilled acute PT in order to address current deficits and progress functional mobility.       Rehab Prognosis: Good; patient would benefit from acute skilled PT services to address these deficits and reach maximum level of function.    Recent Surgery: * No surgery found *      Plan:     During this hospitalization, patient to be seen 4 x/week to address the identified rehab impairments via gait training, therapeutic activities, therapeutic exercises, neuromuscular re-education and progress toward the following goals:    · Plan of Care Expires:  04/28/22    Subjective     Chief Complaint: Headache   Patient/Family Comments/goals: "I feel nauseous." re: sitting EOB  Pain/Comfort:  · Pain Rating 1:  (Pt reported headache (did not rate))  · Pain Addressed 1: Reposition, " Distraction    Patients cultural, spiritual, Orthodox conflicts given the current situation: no    Living Environment:  Pt lives with her  and 2 children (10 yo son; 9 yo daughter) in a 1SH with 4STE in front and back of home with BHR. Pt will have assistance from her  following discharge, but he works during the day and reported getting home around 8pm some days. Pt crochets in her free time.   Prior to admission, patients level of function was IND with ADLs and drives. Equipment used at home: none.  DME owned (not currently used): patient reported prior ownership of a rolling walker, single point cane and shower chair but was unsure if they still had it) .  Upon discharge, patient will have assistance from  after work.    Objective:     Communicated with RN prior to session.  Patient found supine HOB elevated with blood pressure cuff, telemetry, pulse ox (continuous)  upon PT entry to room.    General Precautions: Standard, fall, aspiration   Orthopedic Precautions:N/A   Braces: N/A  Respiratory Status: Room air    Exams:  · Vitals Assessment:   · Supine: 91/56 (74)  · Initial Sit EOB: 108/67 (82)  · Seated EOB ~2 min.: 101/62 (76)  · Cognitive Exam:  Patient is oriented to Person, Place, Time and Situation  · Postural Exam:  Patient presented with the following abnormalities:    · -       Rounded shoulders  · -       Forward head  · Sensation:    · -       Impaired: pt reported tingling on LUE/LE and L side of face with light touch  · RLE ROM: WFL  · RLE Strength: WFL  · Hip Flexion: grossly 4/5  · Ankle DF/Knee Extension: grossly 5/5  · LLE ROM: WFL  · LLE Strength: WFL  · Hip Flexion: grossly 4/5  · Ankle DF/Knee Extension: grossly 5/5    Functional Mobility:  · Bed Mobility:     · Scooting: stand by assistance to sit EOB   · Supine to Sit: stand by assistance with HOB elevated  · Transfers:     · Sit to Stand:  contact guard assistance and HHAx2  · Gait: ~10 ft with CGA and HHAx2 from  bed>window>chair  · Pt had decreased toe-floor clearance, decreased geneva, decreased step length, WBOS  · Cues provided for self-pacing and sx assessment  · Balance:  · Static Sitting: Good  · Dynamic Sitting: Good  · Static Standing: Good  · Dynamic Standing: Good     Therapeutic Activities and Exercises:  Pt educated on role of PT and PT POC. Pt verbalized understanding.   Pt oriented to call bell and instructed to call for staff assist with standing tasks/transfers. Pt verbalized understanding.   Pt educated on the importance of OOB mobility and encouraged to sit UIC as tolerated and contact nursing staff for OOB mobility and functional tasks. Pt verbalized understanding.     AM-PAC 6 CLICK MOBILITY  Total Score:17     Patient left up in chair with all lines intact, call button in reach and  present.    GOALS:   Multidisciplinary Problems     Physical Therapy Goals        Problem: Physical Therapy    Goal Priority Disciplines Outcome Goal Variances Interventions   Physical Therapy Goal     PT, PT/OT Ongoing, Progressing     Description: Goals to be met by: 4/10/2022     Patient will increase functional independence with mobility by performin. Supine to sit with Ipava  2. Sit to supine with Ipava  3. Sit to stand transfer with Supervision  4. Gait  x 150 feet with Supervision using LRAD as needed.   5. Ascend/descend 4 stairs with bilateral Handrails Supervision using No Assistive Device.   6. Stand for ~10 minutes with Supervision using No Assistive Device to perform functional tasks.   7. Lower extremity exercise program x15 reps, with supervision, in order to increase LE strength and (I) with functional mobility.                      History:     Past Medical History:   Diagnosis Date    Anticoagulant long-term use     Anxiety     Depression     GERD (gastroesophageal reflux disease)     Hypothyroidism     Migraine headache     Stroke        Past Surgical History:    Procedure Laterality Date     SECTION      HYSTERECTOMY         Time Tracking:     PT Received On: 22  PT Start Time: 849     PT Stop Time: 908  PT Total Time (min): 19 min     Billable Minutes: Evaluation 9 and Therapeutic Activity 10   (co-eval and treatment performed this date due to need for 2 skilled therapists in order to ensure pt safety, accomodate for pt activity tolerance, and maximize functional potential)        2022

## 2022-03-30 NOTE — ASSESSMENT & PLAN NOTE
Remote L PCA infarct  history of multiple strokes singe age 17 and multiple stroke-like episodes triggered by stress. She is on eliquis at home without clear indication, weak IgM cardiolipin and no clear confirmatory diagnosis for APLS  [facility]
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
## 2022-03-30 NOTE — PT/OT/SLP EVAL
Speech Language Pathology Evaluation  Bedside Swallow    Patient Name:  Dori Gamble   MRN:  [id number]  Admitting Diagnosis: Embolic stroke involving right middle cerebral artery    Recommendations:                 General Recommendations:  Dysphagia therapy, Speech language evaluation and Cognitive-linguistic evaluation  Diet recommendations:  Dental Soft, Thin   Aspiration Precautions: 1 bite/sip at a time, Alternating bites/sips, Small bites/sips and Standard aspiration precautions   General Precautions: Standard, fall  Communication strategies:  provide increased time to answer and go to room if call light pushed    History:     Past Medical History:   Diagnosis Date    Anticoagulant long-term use     Anxiety     Depression     GERD (gastroesophageal reflux disease)     Hypothyroidism     Migraine headache     Stroke        Past Surgical History:   Procedure Laterality Date     SECTION      HYSTERECTOMY         Prior Intubation HX:  None    Modified Barium Swallow: None    Chest X-Rays (3/29): There is no acute cardiopulmonary abnormality.    Prior diet: Reg/thin, but pt endorses tough solids are difficult to chew with sensitive teeth      Subjective     Pt sleeping, easily woke to name  Communicated with nurse prior to session     Pain/Comfort:  · Pain Rating 1: 0/10  · Pain Rating Post-Intervention 1: 0/10    Respiratory Status: Room air    Objective:     Oral Musculature Evaluation  · Oral Musculature: facial asymmetry present, left weakness (reduced sensation)  · Dentition: present and adequate  · Secretion Management: adequate  · Mucosal Quality: good  · Oral Labial Strength and Mobility: Impaired retraction  · Lingual Strength and Mobility: WFL  · Velar Elevation: WFL  · Buccal Strength and Mobility: WFL  · Volitional Cough: strong  · Volitional Swallow: timely and efficient  · Voice Prior to PO Intake: WFL    Bedside Swallow Eval:   Consistencies Assessed:  · Thin liquids 6oz  · Puree  tsp x3  · Solids x1     Oral Phase:   · Residue on L anterior lip unable to clear INDly 2/2 reduced sensation, does not appear to affect swallow function  · Prolonged mastication    Pharyngeal Phase:   · no overt clinical signs/symptoms of aspiration  · no overt clinical signs/symptoms of pharyngeal dysphagia    Compensatory Strategies  · None    Treatment: Pt seen for BSE. SLP recommending Dysphagia soft/thin liquid diet 2/2 pt preference for soft solids and prolonged mastication. Pt and pt's spouse educated on diet recs, SLP POC, and current recs. Pt left with call light in reach. Recs communicated to RN.     Assessment:     Dori Gamble is a 31 y.o. female with an SLP diagnosis of mild oral dysphagia and further SLC eval warranted to assess for further deficits. SLP continue to follow.     Goals:   Multidisciplinary Problems     SLP Goals        Problem: SLP    Goal Priority Disciplines Outcome   SLP Goal     SLP Ongoing, Progressing   Description: Speech-language Pathology Goals  Goals to be met by 4/6  1. Pt will participate in ongoing assessment of swallow to determine safest and least restrictive diet.  2. Pt will participate in SLC eval to further determine POC.                     Plan:     · Patient to be seen:  4 x/week   · Plan of Care expires:  04/29/22  · Plan of Care reviewed with:  patient, spouse   · SLP Follow-Up:  Yes       Discharge recommendations:   (pending)   Barriers to Discharge:  None    Time Tracking:     SLP Treatment Date:   03/30/22  Speech Start Time:  0801  Speech Stop Time:  0811     Speech Total Time (min):  10 min    Billable Minutes: Eval Swallow and Oral Function 10     ALEJO Weinberg-SLP  Speech-Language Pathology    03/30/2022

## 2022-03-30 NOTE — HOSPITAL COURSE
03/31/2022: pending TTE, initiate Apixaban home med  03/31/2022: NAEON. MRI with no hemorrhagic conversion. TTE with EF 65% and mild mitral regurgitation. Stable for transfer to floor on VN service.   04/01/2022: NAEON. TCDs pending. Awaiting bed for step down to vascular neurology.

## 2022-03-30 NOTE — ASSESSMENT & PLAN NOTE
Dori Gamble is a 31 y.o. 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 Patient was not eligible for tPA due to eliquis. CTA at OSH with R M2/M3 occlusion. CT on arrival with early ischemic changes in R frontal lobe. Patient taken to IR now s/p TICI3. MRI with R MCA infarct and new remote microhemorrage in the r posterior temporal lobe. Echo pending.    Recommend TCD to rule out PFO.      Antithrombotics for secondary stroke prevention: DAPT    Statins for secondary stroke prevention and hyperlipidemia, if present:   Statins: Atorvastatin- 40 mg daily    Aggressive risk factor modification: prior stroke and migraine     Rehab efforts: The patient has been evaluated by a stroke team provider and the therapy needs have been fully considered based off the presenting complaints and exam findings. The following therapy evaluations are needed: PT evaluate and treat, OT evaluate and treat, SLP evaluate and treat, PM&R evaluate for appropriate placement    Diagnostics ordered/pending: Lipid Profile to assess cholesterol levels, TTE to assess cardiac function/status , Other: TCD    VTE prophylaxis: Heparin 5000 units SQ every 8 hours  Mechanical prophylaxis: Place SCDs    BP parameters: Infarct: Post sucessful thrombectomy, SBP <140

## 2022-03-30 NOTE — H&P
Inpatient Radiology Pre-procedure Note    History of Present Illness:  Dori Gamble is a 31 y.o. female with previous stroke with right MCA stroke symptoms - CTA OSH right M2 occlusion. NIHSS 7 on admission. CT head - Good ASPECTS.     Admission H&P reviewed.    Hence, plans for cerebral angiogram +/- aspiration thrombectomy     Past Medical History:   Diagnosis Date    Anticoagulant long-term use     Anxiety     Depression     GERD (gastroesophageal reflux disease)     Hypothyroidism     Migraine headache     Stroke      Past Surgical History:   Procedure Laterality Date     SECTION      HYSTERECTOMY         Review of Systems:   As documented in primary team H&P    Home Meds:   Prior to Admission medications    Medication Sig Start Date End Date Taking? Authorizing Provider   apixaban (ELIQUIS) 5 mg Tab Take 1 tablet by mouth 2 (two) times a day.    Historical Provider   atenoloL (TENORMIN) 50 MG tablet Take 1 tablet (50 mg total) by mouth once daily. 20  Vinnie Lorenzana MD   atorvastatin (LIPITOR) 20 MG tablet Take 1 tablet (20 mg total) by mouth once daily. 11/10/21 11/10/22  Feliciano Acosta MD   EScitalopram oxalate (LEXAPRO) 20 MG tablet Take 1 tablet (20 mg total) by mouth once daily. 22  Feliciano Acosta MD   NURTEC 75 mg odt Take 1 tablet by mouth. 21   Historical Provider   omeprazole (PRILOSEC) 10 MG capsule Take 1 capsule (10 mg total) by mouth once daily. 11/10/21 11/10/22  Feliciano Acosta MD   thyroid, pork, (ARMOUR THYROID) 15 mg Tab Take 2 tablets (30 mg total) by mouth before breakfast. 11/10/21 11/10/22  Feliciano Acosta MD   traZODone (DESYREL) 100 MG tablet Take 1 tablet (100 mg total) by mouth every evening. 11/10/21 11/10/22  Feliciano Acosta MD     Scheduled Meds:   Continuous Infusions:   PRN Meds:  Anticoagulants/Antiplatelets: Eliquis     Allergies:   Review of patient's allergies indicates:   Allergen Reactions     Latex, natural rubber Rash     Sedation Hx: have not been any systemic reactions    Labs:  No results for input(s): INR in the last 168 hours.    Invalid input(s):  PT,  PTT    Recent Labs   Lab 03/29/22  1709   WBC 8.98   HGB 14.7   HCT 43.3   MCV 89         Recent Labs   Lab 03/29/22  1709         K 3.6      CO2 21*   BUN 11   CREATININE 0.9   CALCIUM 9.3   ALT 27   AST 22   ALBUMIN 4.1   BILITOT 0.8         Vitals:        Physical Exam:  ASA: 3  Mallampati: 2    General: no acute distress  Mental Status: alert and oriented to person, place and time  HEENT: normocephalic, atraumatic  Chest: unlabored breathing  Heart: regular heart rate  Abdomen: nondistended  Extremity: Left sided weakness       Plan:  Sedation Plan: Up to moderate   Patient will undergo cerebral angiogram +/- aspiration thrombectomy        Garrett Wick MD     Neuro Endovascular Surgery Fellow   [facility]
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
## 2022-03-30 NOTE — ASSESSMENT & PLAN NOTE
Follow-up to patient discharge plan     CM spoke with Mrs. Jessica Lund discuss calling insurance care manager to assist patient in the community or dispatch Health. [address] has decline services unable to start in a timely manner. Astrid Farrell has decline patient for admission. THERESA has left multiple calls in regards to patient care coordinator [phone] option 4. Waiting on call back patient aware to call her insurance and ask for her insurance care coordinator.     John Hurst CM Remote L PCA infarct  history of multiple strokes singe age 17 and multiple stroke-like episodes triggered by stress. She is on eliquis at home without clear indication, weak IgM cardiolipin and no clear confirmatory diagnosis for APLS  [facility]

## 2022-03-30 NOTE — PLAN OF CARE
PT evaluation complete and appropriate goals established.    3/30/2022    Problem: Physical Therapy  Goal: Physical Therapy Goal  Description: Goals to be met by: 4/10/2022     Patient will increase functional independence with mobility by performin. Supine to sit with [place]  2. Sit to supine with [place]  3. Sit to stand transfer with Supervision  4. Gait  x 150 feet with Supervision using LRAD as needed.   5. Ascend/descend 4 stairs with bilateral Handrails Supervision using No Assistive Device.   6. Stand for ~10 minutes with Supervision using No Assistive Device to perform functional tasks.   7. Lower extremity exercise program x15 reps, with supervision, in order to increase LE strength and (I) with functional mobility.     Outcome: Ongoing, Progressing
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
## 2022-03-30 NOTE — SUBJECTIVE & OBJECTIVE
Neurologic Chief Complaint: slurred    Subjective:     Interval History: Patient is seen for follow-up neurological assessment and treatment recommendations: Neuro exam stable. [facility]ending echo[facility] for [facility].    HPI, Past Medical, Family, and Social History remains the same as documented in the initial encounter.     Review of Systems   Constitutional:  Negative for activity change and fatigue.   HENT:  Negative for drooling and trouble swallowing.    Eyes:  Positive for visual disturbance. Negative for photophobia.   Respiratory:  Negative for cough and shortness of breath.    Cardiovascular:  Negative for chest pain and palpitations.   Gastrointestinal:  Negative for nausea and vomiting.   Musculoskeletal:  Negative for neck pain and neck stiffness.   Skin:  Negative for rash and wound.   Neurological:  Positive for facial asymmetry, speech difficulty and weakness. Negative for headaches.   Psychiatric/Behavioral:  Negative for confusion. The patient is not nervous/anxious.    Scheduled Meds:   apixaban  5 mg Oral BID    atorvastatin  40 mg Oral Daily    EScitalopram oxalate  20 mg Oral Daily    pantoprazole  40 mg Oral Daily    senna-docusate 8.6-50 mg  1 tablet Oral Daily    thyroid (pork)  30 mg Oral Before breakfast     Continuous Infusions:  PRN Meds:acetaminophen, ondansetron, sodium chloride 0.9%    Objective:     Vital Signs (Most Recent):  Temp: 98.8 °F (37.1 °C) (03/30/22 1105)  Pulse: 62 (03/30/22 1335)  Resp: (!) 25 (03/30/22 1335)  BP: 107/62 (03/30/22 1335)  SpO2: 97 % (03/30/22 1335)  BP Location: Left arm    Vital Signs Range (Last 24H):  Temp:  [97.9 °F (36.6 °C)-99.5 °F (37.5 °C)]   Pulse:  [51-95]   Resp:  [3-39]   BP: ()/()   SpO2:  [92 %-100 %]   BP Location: Left arm    Physical Exam  Constitutional:       General: She is not in acute distress.  HENT:      Head: Normocephalic.   Eyes:      Extraocular Movements: Extraocular movements intact.    Cardiovascular:      Rate and Rhythm: Normal rate.   Pulmonary:      Effort: Pulmonary effort is normal. No respiratory distress.   Abdominal:      General: Abdomen is flat.   Musculoskeletal:      Comments: LSW   Skin:     General: Skin is warm and dry.   Neurological:      Mental Status: She is alert and oriented to person, place, and time.      Cranial Nerves: No dysarthria or facial asymmetry.      Motor: Weakness present.      Coordination: Finger-Nose-Finger Test normal.   Psychiatric:         Mood and Affect: Mood normal.       Neurological Exam:   LOC: alert  Attention Span: Good   Language: No aphasia  Articulation: Dysarthria  Orientation: Person, Place, Time   Visual Fields: Inferior quadrantanopsia: right  EOM (CN III, IV, VI): Full/intact  Facial Movement (CN VII): Lower facial weakness on the Right  Motor: Arm left  Paresis: 4/5  Leg left  Paresis: 4/5  Arm right  Normal 5/5  Leg right Normal 5/5  Cerebellum: No evidence of appendicular or axial ataxia    Laboratory:  CMP:   Recent Labs   Lab 03/30/22  0005   CALCIUM 9.5   ALBUMIN 3.9   PROT 7.2      K 3.9   CO2 21*      BUN 9   CREATININE 0.8   ALKPHOS 80   ALT 26   AST 21   BILITOT 1.0     BMP:   Recent Labs   Lab 03/30/22  0005      K 3.9      CO2 21*   BUN 9   CREATININE 0.8   CALCIUM 9.5     CBC:   Recent Labs   Lab 03/30/22  0005   WBC 9.88   RBC 4.76   HGB 14.2   HCT 43.4      MCV 91   MCH 29.8   MCHC 32.7     Lipid Panel:   Recent Labs   Lab 03/30/22  0005   CHOL 159   LDLCALC 87.8   HDL 54   TRIG 86     Coagulation:   Recent Labs   Lab 03/29/22 2055   INR 1.1     Platelet Aggregation Study: No results for input(s): PLTAGG, PLTAGINTERP, PLTAGREGLACO, ADPPLTAGGREG in the last 168 hours.  Hgb A1C:   Recent Labs   Lab 03/30/22  0005   HGBA1C 4.6     TSH:   Recent Labs   Lab 03/29/22 2055   TSH 9.952*       Diagnostic Results     Brain Imaging   [facility] 3/29/2022  1. No acute intracranial abnormality.  2. Chronic  focal encephalomalacia of the left occipital lobe.    [facility] 3/29/2022  Suspected early ischemic changes involving the right frontal lobe in this patient with suspected small branch vessel occlusion involving the anterior right MCA on prior outside CTA.     No acute intracranial hemorrhage.    MRI Brain 3/30/2022  Recent infarction in the right MCA distribution.  Region is grossly stable from the pre-thrombectomy CT without evidence of significant infarct extension or hemorrhagic conversion.  2. Age advanced chronic microvascular ischemic change.  3. Stable small remote lacunar-type infarcts involving the left caudate and right cerebellum as well as stable region of encephalomalacia involving the left occipital lobe.  4. New remote microhemorrhage in the right posterior temporal lobe.    Vessel Imaging   CTA 3/29/2022  The cervical vertebral and carotid arteries are patent.  Also, there is no intracranial large vessel occlusion or hemodynamically significant stenosis.  There is no thrombosis, dissection or large aneurysm.    Cardiac Imaging   Echo pending   pending labs and covid screening

## 2022-03-30 NOTE — ASSESSMENT & PLAN NOTE
32 y/o female with h/o previous CVAs on apixaban (unknown reason), TBI, and hypothyroidism presents with speech difficulty, found to have R M2 occlusion and is now s/p mechanical thrombectomy of RM2 occlusion with TICI 3 reperfusion.   - Admit to NCC  - Vascular Neurology following  - SBP goal 100-140  - MRI tonight to evaluate stroke burden, if no hemorrhagic conversion, will restart home anticoagulation   - TSH elevated, free t4 WNL   - Lipid panel + Hemoglobin A1c pending  - Hourly neuro checks  - Post-thrombectomy RN protocol   - Begin daily statin  - Echo pending   - PT/OT/SLP in AM

## 2022-03-30 NOTE — PLAN OF CARE
[facility] Care Plan    POC reviewed with Dori Gamble and family at 1400. Pt verbalized understanding. Questions and concerns addressed. No acute events today. Pt progressing toward goals. Will continue to monitor. See below and flowsheets for full assessment and VS info.       Neuro:  Gwen Coma Scale  Best Eye Response: 4-->(E4) spontaneous  Best Motor Response: 6-->(M6) obeys commands  Best Verbal Response: 5-->(V5) oriented  Gwen Coma Scale Score: 15  Assessment Qualifiers: no eye obstruction present  Pupil PERRLA: yes     24 hr Temp:  [97.9 °F (36.6 °C)-99.5 °F (37.5 °C)]     CV:   Rhythm: sinus bradycardia  BP goals:   SBP < 140  MAP > 65    Resp:   O2 Device (Oxygen Therapy): room air       Plan: N/A    GI/:     Diet/Nutrition Received: mechanical/dental soft  Last Bowel Movement: 03/29/22  Voiding Characteristics: hesitancy    Intake/Output Summary (Last 24 hours) at 3/30/2022 1618  Last data filed at 3/30/2022 1505  Gross per 24 hour   Intake 400 ml   Output 1250 ml   Net -850 ml     Unmeasured Output  Urine Occurrence: 1    Labs/Accuchecks:  Recent Labs   Lab 03/30/22  0005   WBC 9.88   RBC 4.76   HGB 14.2   HCT 43.4         Recent Labs   Lab 03/30/22  0005      K 3.9   CO2 21*      BUN 9   CREATININE 0.8   ALKPHOS 80   ALT 26   AST 21   BILITOT 1.0      Recent Labs   Lab 03/29/22 2055   INR 1.1    No results for input(s): CPK, CPKMB, TROPONINI, MB in the last 168 hours.    Electrolytes: N/A - electrolytes WDL  Accuchecks: Q6H    Gtts:      LDA/Wounds:  Lines/Drains/Airways       Peripheral Intravenous Line  Duration                  Peripheral IV - Single Lumen 03/29/22 2054 20 G Right Forearm <1 day         Peripheral IV - Single Lumen 03/29/22 2300 18 G Left Antecubital <1 day                  Wounds: No  Wound care consulted: No

## 2022-03-30 NOTE — ED TRIAGE NOTES
Dori Gamble, a 31 y.o. female presents to the ED w/ complaint of increased left sided weakness and slurred speech. Patient arrives as transfer from [place] for Neuro Evaluation after abnormal CT scan. Patient LKN was 1545 this afternoon. Patient states she was arguing with her kid, had sudden onset of slurred speech. Patient reports left sided weakness at baseline due previous strokes. Patient reports the weakness suddenly got worse at the time the slurred speech began. Patient AOx4, able to follow commands. Right sided facial droop noted. CBG 79. Patient in CT scanner at this time; stroke team at bedside.     Triage note:  Chief Complaint   Patient presents with    Cerebrovascular Accident     Review of patient's allergies indicates:   Allergen Reactions    Latex, natural rubber Rash     Past Medical History:   Diagnosis Date    Anticoagulant long-term use     Anxiety     Depression     GERD (gastroesophageal reflux disease)     Hypothyroidism     Migraine headache     Stroke

## 2022-03-30 NOTE — CONSULTS
Inpatient consult to Physical Medicine Rehab  Consult performed by: Adeline Shah NP  Consult ordered by: Adeline Rizzo PA-C  Reason for consult: Assess rehab needs      Reviewed patient history and current admission.  Rehab team following.  Full consult to follow.    JOSE L Montano, FNP-C  Physical Medicine & Rehabilitation   03/30/2022

## 2022-03-30 NOTE — HOSPITAL COURSE
Dori Gamble is a 31 y.o. female with PMHx of L PCA stroke (baseline LSW and tingling), Multiple CVAs, hypothyroidism, migraine, TBI (last year with speech arrest) and anticoagulation use who presented to ED as transfer from [place] for possible thrombectomy. Patient was not eligible for tPA due to eliquis. CTA at OSH with R M2/M3 occlusion. CT on arrival with early ischemic changes in R frontal lobe. Patient taken to IR now s/p TICI3. MRI with R MCA infarct and new remote microhemorrage in the r posterior temporal lobe. Echo with no PFO. TCD negative for PFO. Eliquis discontinue 2/2 no clear indication and patient having strokes while on eliquis. Started of DAPT and statin.      Therapy recommending home with home health. Hypercoagulable panel pending. Patient reports she has no insurance and might have difficulty coming back for a follow up, reports she is working on getting disability.     Will be discharge today.

## 2022-03-30 NOTE — SUBJECTIVE & OBJECTIVE
Review of Systems  Constitutional: Denies fevers, weight loss, chills, or weakness.  Eyes: Denies changes in vision.  ENT: Denies dysphagia, nasal discharge, ear pain or discharge.  Cardiovascular: Denies chest pain, palpitations, orthopnea, or claudication.  Respiratory: Denies shortness of breath, cough, hemoptysis, or wheezing.  GI: Denies nausea/vomitting, hematochezia, melena, abd pain, or changes in appetite.  : Denies dysuria, incontinence, or hematuria.  Musculoskeletal: Denies joint pain or myalgias.  Skin/breast: Denies rashes, lumps, lesions, or discharge.  Neurologic: Denies headache, dizziness, vertigo, or paresthesias.  Psychiatric: Denies changes in mood or hallucinations.  Endocrine: Denies polyuria, polydipsia, heat/cold intolerance.  Hematologic/Lymph: Denies lymphadenopathy, easy bruising or easy bleeding.  Allergic/Immunologic: Denies rash, rhinitis.    Objective:     Vitals:  Temp: 98.8 °F (37.1 °C)  Pulse: (!) 55  Rhythm: sinus bradycardia  BP: (!) 101/58  MAP (mmHg): 74  Resp: 19  SpO2: 97 %  O2 Device (Oxygen Therapy): room air    Temp  Min: 97.9 °F (36.6 °C)  Max: 99.5 °F (37.5 °C)  Pulse  Min: 51  Max: 95  BP  Min: 90/66  Max: 152/74  MAP (mmHg)  Min: 65  Max: 109  Resp  Min: 3  Max: 39  ETCO2 (mmHg)  Min: 29 mmHg  Max: 34 mmHg  SpO2  Min: 92 %  Max: 100 %    03/29 0701 - 03/30 0700  In: 200 [P.O.:200]  Out: 850 [Urine:850]   Unmeasured Output  Urine Occurrence: 1       Physical Exam  GA: comfortable, no acute distress.   HEENT: No scleral icterus or JVD.   Pulmonary: Clear to auscultation A/L.  Cardiac: RRR S1 & S2 w/o rubs/murmurs/gallops.   Abdominal: Bowel sounds present x 4. No appreciable hepatosplenomegaly.  Skin: No jaundice, rashes, or visible lesions.  Neuro:  --GCS: E4 V5 M6  --Mental Status:  awake, oriented X4, follows commands, slurred speech  --CN II-XII grossly intact.   --Pupils 3mm, PERRL.   --Corneal reflex, gag, cough intact.  --ARMENDARIZ all  spont    Medications:  Continuous Scheduledapixaban, 5 mg, BID  atorvastatin, 40 mg, Daily  EScitalopram oxalate, 20 mg, Daily  pantoprazole, 40 mg, Daily  senna-docusate 8.6-50 mg, 1 tablet, Daily  thyroid (pork), 30 mg, Before breakfast    PRNacetaminophen, 650 mg, Q6H PRN  ondansetron, 8 mg, Q8H PRN  sodium chloride 0.9%, 10 mL, PRN      Today I personally reviewed pertinent medications, lines/drains/airways, imaging, cardiology results, laboratory results, microbiology results,    Diet  Diet Dysphagia Pureed (IDDSI Level 4) [facility]; Thin

## 2022-03-30 NOTE — PROGRESS NOTES
[facility] - Neuro Critical Care  Vascular Neurology  Comprehensive Stroke Center  Progress Note    Assessment/Plan:     * Embolic stroke involving right middle cerebral artery  Dori Gamble is a 31 y.o. 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 Patient was not eligible for tPA due to eliquis. CTA at OSH with R M2/M3 occlusion. CT on arrival with early ischemic changes in R frontal lobe. Patient taken to IR now s/p TICI3. MRI with R MCA infarct and new remote microhemorrage in the r posterior temporal lobe. Echo pending.    Recommend TCD to rule out PFO.      Antithrombotics for secondary stroke prevention: DAPT    Statins for secondary stroke prevention and hyperlipidemia, if present:   Statins: Atorvastatin- 40 mg daily    Aggressive risk factor modification: prior stroke and migraine     Rehab efforts: The patient has been evaluated by a stroke team provider and the therapy needs have been fully considered based off the presenting complaints and exam findings. The following therapy evaluations are needed: PT evaluate and treat, OT evaluate and treat, SLP evaluate and treat, PM&R evaluate for appropriate placement    Diagnostics ordered/pending: Lipid Profile to assess cholesterol levels, TTE to assess cardiac function/status , Other: TCD    VTE prophylaxis: Heparin 5000 units SQ every 8 hours  Mechanical prophylaxis: Place SCDs    BP parameters: Infarct: Post sucessful thrombectomy, SBP <140        Cytotoxic cerebral edema  Area of cerebral edema identified when reviewing brain imaging in the territory of the R middle cerebral artery. We will continue to monitor with q4h neuro checks while on floor or more frequently while in neuro critical care, as any change in the patients clinical exam may signify expansion of the  insult and/or the area of the edema. Such changes may require acute interventions to prevent loss of function and/or death.              Hypothyroidism  Continue home synthroid    History of stroke  Remote L PCA infarct  history of multiple strokes singe age 17 and multiple stroke-like episodes triggered by stress. She is on eliquis at home without clear indication, weak IgM cardiolipin and no clear confirmatory diagnosis for APLS  Follows with non [facility] neurology           3/30-Neuro exam stable. Pending echo. Recc TCD with microemboli to evaluate for PFO.      STROKE DOCUMENTATION   Acute Stroke Times   Last Known Normal Date: 03/29/22  Last Known Normal Time: 1300  Symptom Onset Date: 03/29/22  Symptom Onset Time: 1300  Stroke Team Called Date: 03/29/22  Stroke Team Called Time: 2039  Stroke Team Arrival Date: 03/29/22  Stroke Team Arrival Time: 2039  CT completed but images not available for review - spoke to document results: 2050  Alteplase Recommended: No  CTA Interpretation Time: 1840 (OSH images reviewed prior to transfer)  Thrombectomy Recommended: Yes  Decision to Treat Time for IR: 2050    NIH Scale:  1a. Level of Consciousness: 0-->Alert, keenly responsive  1b. LOC Questions: 0-->Answers both questions correctly  1c. LOC Commands: 0-->Performs both tasks correctly  2. Best Gaze: 0-->Normal  3. Visual: 1-->Partial hemianopia  4. Facial Palsy: 1-->Minor paralysis (flattened nasolabial fold, asymmetry on smiling)  5a. Motor Arm, Left: 1-->Drift, limb holds 90 (or 45) degrees, but drifts down before full 10 seconds, does not hit bed or other support  5b. Motor Arm, Right: 0-->No drift, limb holds 90 (or 45) degrees for full 10 secs  6a. Motor Leg, Left: 1-->Drift, leg falls by the end of the 5-sec period but does not hit bed  6b. Motor Leg, Right: 0-->No drift, leg holds 30 degree position for full 5 secs  7. Limb Ataxia: 0-->Absent  8. Sensory: 0-->Normal, no sensory loss  9. Best Language: 0-->No  aphasia, normal  10. Dysarthria: 2-->Severe dysarthria, patients speech is so slurred as to be unintelligible in the absence of or out of proportion to any dysphasia, or is mute/anarthric  11. Extinction and Inattention (formerly Neglect): 0-->No abnormality  Total (NIH Stroke Scale): 6       Modified Fidel Score: 0 (patient reports she ambulates independently since her stroke)  [place] Coma Scale:    ABCD2 Score:    [id number] Score:   HAS -BLED Score:   ICH Score:   Hunt & Melton Classification:      Hemorrhagic change of an Ischemic Stroke: Does this patient have an ischemic stroke with hemorrhagic changes? Yes, Grading Scale: HI Type 1 (HI-1) = small petechiae along the margins of the infarct. Is this a symptomatic change?  No - Hemorrhage is not clinically significant     Neurologic Chief Complaint: slurred    Subjective:     Interval History: Patient is seen for follow-up neurological assessment and treatment recommendations: Neuro exam stable. Pending echo. Recc TCD with microemboli to evaluate for PFO.    HPI, Past Medical, Family, and Social History remains the same as documented in the initial encounter.     Review of Systems   Constitutional:  Negative for activity change and fatigue.   HENT:  Negative for drooling and trouble swallowing.    Eyes:  Positive for visual disturbance. Negative for photophobia.   Respiratory:  Negative for cough and shortness of breath.    Cardiovascular:  Negative for chest pain and palpitations.   Gastrointestinal:  Negative for nausea and vomiting.   Musculoskeletal:  Negative for neck pain and neck stiffness.   Skin:  Negative for rash and wound.   Neurological:  Positive for facial asymmetry, speech difficulty and weakness. Negative for headaches.   Psychiatric/Behavioral:  Negative for confusion. The patient is not nervous/anxious.    Scheduled Meds:   apixaban  5 mg Oral BID    atorvastatin  40 mg Oral Daily    EScitalopram oxalate  20 mg Oral Daily    pantoprazole   40 mg Oral Daily    senna-docusate 8.6-50 mg  1 tablet Oral Daily    thyroid (pork)  30 mg Oral Before breakfast     Continuous Infusions:  PRN Meds:acetaminophen, ondansetron, sodium chloride 0.9%    Objective:     Vital Signs (Most Recent):  Temp: 98.8 °F (37.1 °C) (03/30/22 1105)  Pulse: 62 (03/30/22 1335)  Resp: (!) 25 (03/30/22 1335)  BP: 107/62 (03/30/22 1335)  SpO2: 97 % (03/30/22 1335)  BP Location: Left arm    Vital Signs Range (Last 24H):  Temp:  [97.9 °F (36.6 °C)-99.5 °F (37.5 °C)]   Pulse:  [51-95]   Resp:  [3-39]   BP: ()/()   SpO2:  [92 %-100 %]   BP Location: Left arm    Physical Exam  Constitutional:       General: She is not in acute distress.  HENT:      Head: Normocephalic.   Eyes:      Extraocular Movements: Extraocular movements intact.   Cardiovascular:      Rate and Rhythm: Normal rate.   Pulmonary:      Effort: Pulmonary effort is normal. No respiratory distress.   Abdominal:      General: Abdomen is flat.   Musculoskeletal:      Comments: LSW   Skin:     General: Skin is warm and dry.   Neurological:      Mental Status: She is alert and oriented to person, place, and time.      Cranial Nerves: No dysarthria or facial asymmetry.      Motor: Weakness present.      Coordination: Finger-Nose-Finger Test normal.   Psychiatric:         Mood and Affect: Mood normal.       Neurological Exam:   LOC: alert  Attention Span: Good   Language: No aphasia  Articulation: Dysarthria  Orientation: Person, Place, Time   Visual Fields: Inferior quadrantanopsia: right  EOM (CN III, IV, VI): Full/intact  Facial Movement (CN VII): Lower facial weakness on the Right  Motor: Arm left  Paresis: 4/5  Leg left  Paresis: 4/5  Arm right  Normal 5/5  Leg right Normal 5/5  Cerebellum: No evidence of appendicular or axial ataxia    Laboratory:  CMP:   Recent Labs   Lab 03/30/22  0005   CALCIUM 9.5   ALBUMIN 3.9   PROT 7.2      K 3.9   CO2 21*      BUN 9   CREATININE 0.8   ALKPHOS 80   ALT 26    AST 21   BILITOT 1.0     BMP:   Recent Labs   Lab 03/30/22  0005      K 3.9      CO2 21*   BUN 9   CREATININE 0.8   CALCIUM 9.5     CBC:   Recent Labs   Lab 03/30/22  0005   WBC 9.88   RBC 4.76   HGB 14.2   HCT 43.4      MCV 91   MCH 29.8   MCHC 32.7     Lipid Panel:   Recent Labs   Lab 03/30/22  0005   CHOL 159   LDLCALC 87.8   HDL 54   TRIG 86     Coagulation:   Recent Labs   Lab 03/29/22 2055   INR 1.1     Platelet Aggregation Study: No results for input(s): PLTAGG, PLTAGINTERP, PLTAGREGLACO, ADPPLTAGGREG in the last 168 hours.  Hgb A1C:   Recent Labs   Lab 03/30/22  0005   HGBA1C 4.6     TSH:   Recent Labs   Lab 03/29/22 2055   TSH 9.952*       Diagnostic Results     Brain Imaging   [facility] 3/29/2022  1. No acute intracranial abnormality.  2. 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    CTH 3/29/2022  Suspected early ischemic changes involving the right frontal lobe in this patient with suspected small branch vessel occlusion involving the anterior right MCA on prior outside CTA.     No acute intracranial hemorrhage.    MRI Brain 3/30/2022  Recent infarction in the right MCA distribution.  Region is grossly stable from the pre-thrombectomy CT without evidence of significant infarct extension or hemorrhagic conversion.  2. Age advanced chronic microvascular ischemic change.  3. Stable small remote lacunar-type infarcts involving the left caudate and right cerebellum as well as stable region of encephalomalacia involving the left occipital lobe.  4. New remote microhemorrhage in the right posterior temporal lobe.    Vessel Imaging   CTA 3/29/2022  The cervical vertebral and carotid arteries are patent.  Also, there is no intracranial large vessel occlusion or hemodynamically significant stenosis.  There is no thrombosis, dissection or large aneurysm.    Cardiac Imaging   Echo pending      Rosa Maria Ashley NP  [facility]  Department of Vascular Neurology   [facility]  Neuro Critical Care

## 2022-03-30 NOTE — PLAN OF CARE
Admit Date:  3/29/2022  8:40 PM      Admit Diagnosis:  Stroke [I63.9]  Acute ischemic right MCA stroke [I63.511]    Transferred from:     Past Medical History:   Diagnosis Date    Anticoagulant long-term use     Anxiety     Depression     GERD (gastroesophageal reflux disease)     Hypothyroidism     Migraine headache     Stroke          SW met with patient in room for Discharge Planning Assessment.  Patient was able to answer questions.  Per Pt, she lives with her  and two small children in a one story home with 4 step(s) to enter.   Per Pt, she was independent with ADLS and used no DME for ambulation.  Patient will have assistance from her  upon discharge. Pt insurance termed. [facility] has completed their screening and SSI referral submitted. Per [facility], SSI disability has a meeting at [facility] tomorrow for the application to be completed for disability.   Discharge Planning Booklet given to patient/family and discussed.  All questions addressed.  Assigned SW/CM will follow for needs.    Emergency Contact: Extended Emergency Contact Information  Primary Emergency Contact: Gary Gamble  Mobile Phone: [phone]  Relation: Spouse  Preferred language: English   needed? No     PCP: Feliciano Acosta MD    Pharmacy:   [facility]/pharmacy #[id number] - [place], [place] - [address]  [address] [place] [address]  Phone: [phone] Fax: [phone]    [facility], [place] - [address] [address]  Phone: [phone] Fax: [phone]    [facility] [address] [facility], [place] - [address] [address] [facility] [address]  [address]  [place] [address]  Phone: [phone] Fax: [phone]      Payor: Payor: /      03/30/22 1631   Discharge Assessment   Assessment Type Discharge Planning Assessment   Confirmed/corrected address, phone number and insurance Yes   Confirmed Demographics Correct on Facesheet   Source of Information patient   Communicated STEVNE with  patient/caregiver Date not available/Unable to determine   Reason For Admission Embolic stroke involving right middle cerebral artery   Lives With child(guido), dependent;spouse   Facility Arrived From: [facility]   Do you expect to return to your current living situation? Yes   Do you have help at home or someone to help you manage your care at home? Yes   Who are your caregiver(s) and their phone number(s)? Gary Gamble () [phone]   Prior to hospitilization cognitive status: Alert/Oriented   Current cognitive status: Alert/Oriented   Walking or Climbing Stairs Difficulty none   Dressing/Bathing Difficulty none   Home Accessibility not wheelchair accessible;stairs to enter home   Number of Stairs, Main Entrance four   Stair Railings, Main Entrance railings safe and in good condition   Home Layout Able to live on 1st floor   Equipment Currently Used at Home none   Readmission within 30 days? No   Patient currently being followed by outpatient case management? No   Do you currently have service(s) that help you manage your care at home? No   Do you take prescription medications? Yes   Do you have prescription coverage? No   Do you have any problems affording any of your prescribed medications? Yes   Is the patient taking medications as prescribed? yes   Who is going to help you get home at discharge? Gary Gamble () [phone]   How do you get to doctors appointments? car, drives self;family or friend will provide   Are you on dialysis? No   Do you take coumadin? No   Discharge Plan A Home with family   Discharge Plan B Home   DME Needed Upon Discharge  none   Discharge Plan discussed with: Patient   Discharge Barriers Identified Unisured   Relationship/Environment   Name(s) of Who Lives With Patient Gary Gamble () [phone]     Linnea Andrade LCSW  Neurocritical Care   [facility]  [id number]

## 2022-03-30 NOTE — PLAN OF CARE
Thrombectomy completed, pt tolerated well. No apparent distress noted. Closure device deployed, elevate HOB at 2335. Dressing applied CDI. report given to VARUN Reilly with Rapid response. Pt transferred to PACU, awaiting ICU bed.

## 2022-03-30 NOTE — CARE UPDATE
RAPID RESPONSE NURSE IR NOTE       Admit Date: 3/29/2022  LOS: 0  Code Status: Full Code   Date of Consult: 2022  : 1990  Age: 31 y.o.  Weight:   Wt Readings from Last 1 Encounters:   22 93.4 kg (206 lb)     Sex: female  Race: White   Bed: SVITLANA/SVITLANA:   MRN: [id number]  Time Rapid Response Team notified:  Time Rapid Response Team at bedside:   Time Rapid Response Team left bedside:   Was the patient discharged from an ICU this admission?   no  Was the patient discharged from a PACU within last 24 hours?  no  Did the patient receive conscious sedation/general anesthesia within last 24 hours?  no  Was the patient in the ED within the past 24 hours?  yes  Was the patient started on NIPPV within the past 24 hours?  no  Did this progress into an ARC or CPA:  no  Attending Physician: Benigno Yepez MD  Primary Service: Networked reference to record PCT     SITUATION  I  Reason for Call: IR thrombectomy    BACKGROUND    Why is the patient in the hospital?: Embolic stroke involving right middle cerebral artery  Patient has a past medical history of Anticoagulant long-term use, Anxiety, Depression, GERD (gastroesophageal reflux disease), Hypothyroidism, Migraine headache, and Stroke.    ASSESSMENT    Last know well time:     IR arrival time: In room:   TICI Score: TICI Score: 3  IR procedure end time: Procedure End Time:     Last VS: /72   Pulse 64   Temp 99.5 °F (37.5 °C) (Oral)   Resp 16   SpO2 100%     24H Vital Sign Range:  Temp:  [99.5 °F (37.5 °C)]   Pulse:  [59-95]   Resp:  [16-22]   BP: ()/()   SpO2:  [95 %-100 %]     BP parameters: TICI 3 maintain SBP < 140    Bedrest: Closure device utilized. Patient must remain flat for 2 hours after Procedure End Time: .    FREQUENT DOCUMENTION    Post procedure VS, Neuro and groin site checks: Q15m x 2H, Q30min x 6H, then hourly    See flowsheets for further documentation.    FOLLOW-UP/CONTINGENCY PLAN    Call the  Rapid Response Nurse, Melba Stevens RN at [phone] for additional questions or concerns.    PROVIDER ESCALATION    Vascular Neurology provider you spoke with:Adeline Rizzo  Disposition: Tx in ICU bed 5782.

## 2022-03-30 NOTE — PROGRESS NOTES
Patient arrived to [facility] >>IR>>Siler     Type of stroke/diagnosis: R MCA    Thrombectomy end time: 2135    Current symptoms:  baseline LSW, tingling, headache, facial droop, previous dysarthria, peripheral vision loss, RSW     Skin assessment done: yes    Wounds noted: IR R groin site    [place] Completed? pending    Patient Belongings on Admit: Shirt, bra, pants, shoes, cell phone      NCC notified: LILIAM Cardoza

## 2022-03-30 NOTE — PLAN OF CARE
Evaluation completed, plan of care established.     Problem: Occupational Therapy  Goal: Occupational Therapy Goal  Description: Goals to be met by: 4/30/22     Patient will increase functional independence with ADLs by performing:    UE Dressing with Modified [place].  LE Dressing with Modified [place].  Grooming while standing at sink with Modified [place].  Toileting from toilet with Modified [place] for hygiene and clothing management.   Supine to sit with Modified [place].  Step transfer with Modified [place]  Toilet transfer to toilet with Modified [place].    Outcome: Ongoing, Progressing

## 2022-03-30 NOTE — ASSESSMENT & PLAN NOTE
Dori Gamble is a 31 y.o. 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 Patient was not eligible for tPA due to eliquis. CTA at OSH with R M2/M3 occlusion. CT on arrival with early ischemic changes in R frontal lobe. Patient taken to IR for possible thrombectomy.      Antithrombotics for secondary stroke prevention: Anticoagulants: Apixaban 5 mg BID     Statins for secondary stroke prevention and hyperlipidemia, if present:   Statins: Atorvastatin- 40 mg daily    Aggressive risk factor modification: prior stroke and migraine     Rehab efforts: The patient has been evaluated by a stroke team provider and the therapy needs have been fully considered based off the presenting complaints and exam findings. The following therapy evaluations are needed: PT evaluate and treat, OT evaluate and treat, SLP evaluate and treat, PM&R evaluate for appropriate placement    Diagnostics ordered/pending: HgbA1C to assess blood glucose levels, Lipid Profile to assess cholesterol levels, MRI head without contrast to assess brain parenchyma, TSH to assess thyroid function, Other: TTE with bubble    VTE prophylaxis: Heparin 5000 units SQ every 8 hours  Mechanical prophylaxis: Place SCDs    BP parameters: Infarct: Post sucessful thrombectomy, SBP <140  Post unsucessful thrombectomy, SBP <220

## 2022-03-30 NOTE — PROCEDURES
Radiology Post-Procedure Note    Pre Op Diagnosis: Right M2 occlusion     Post Op Diagnosis: Right M2 occlusion S/p ADAPT - TICI3    Procedure: Cerebral angiogram    Procedure performed by: Didier Rodriguez MD    Written Informed Consent Obtained: Yes    Specimen Removed: NO    Estimated Blood Loss: Minimal    Procedure report:     A 8F sheath was placed into the right femoral artery and a 5F Impress Merit catheter / Neuron Max 88 Catheter was advanced into the aortic arch. The right CCA; ICA arteries were subselected and angiography of the brain was performed after injection into each of these vessels.    Preliminary interpretation: Right M2 occlusion was identified and Aspiration thrombectomy was performed using 4MAX Aspiration Catheter, guided with aid of Fathom Steerable Guidewire.     Reperfusion score TICI 3 on First Pass was achieved.     Please see Imaging report for full details.    A right femoral artery angiogram was performed, the sheath removed and hemostasis achieved using perclose.  No hematoma was present at the time of hemostasis.    The patient tolerated the procedure well.         Garrett Wick MD     Neuro Endovascular Surgery Fellow   [facility]

## 2022-03-30 NOTE — ED NOTES
Report to Robby Reilly at this time. Patient vitally stable on IR table preparing for procedure.     Patient belongings provided to IR team in patient belongings bag:   - shirt  - bra  - pants  - shoes  - cell phone

## 2022-03-30 NOTE — ASSESSMENT & PLAN NOTE
Evidence of previous strokes in left parieto-occipital lobe, left caudate nucleus lacunar, and right cerebellum from on MRI in 2021  - MRI brain pending  - Will need continued work-up and close outpatient following given recurrent strokes in this 32 yo  - Restart AC once MRI brain complete and no hemorrhage

## 2022-03-30 NOTE — PLAN OF CARE
[facility] Care Plan    POC reviewed with Dori Gamble and family at 0300. Pt verbalized understanding. Questions and concerns addressed. No acute events overnight. Pt progressing toward goals. Will continue to monitor. See below and flowsheets for full assessment and VS info.   Admit to [facility] post Thrombectomy.  Vito passed.          Neuro:  [place] Coma Scale  Best Eye Response: 4-->(E4) spontaneous  Best Motor Response: 6-->(M6) obeys commands  Best Verbal Response: 5-->(V5) oriented  Gwen Coma Scale Score: 15  Assessment Qualifiers: no eye obstruction present, patient not sedated/intubated        24hr Temp:  [97.9 °F (36.6 °C)-99.5 °F (37.5 °C)]     CV:   Rhythm: normal sinus rhythm  BP goals:   SBP < 160  MAP > 65    Resp:   O2 Device (Oxygen Therapy): room air       Plan: N/A    GI/:     Diet/Nutrition Received: NPO, sips of water  Last Bowel Movement: 03/29/22  Voiding Characteristics: hesitancy    Intake/Output Summary (Last 24 hours) at 3/30/2022 0540  Last data filed at 3/30/2022 0235  Gross per 24 hour   Intake 200 ml   Output 650 ml   Net -450 ml          Labs/Accuchecks:  Recent Labs   Lab 03/30/22  0005   WBC 9.88   RBC 4.76   HGB 14.2   HCT 43.4         Recent Labs   Lab 03/30/22  0005      K 3.9   CO2 21*      BUN 9   CREATININE 0.8   ALKPHOS 80   ALT 26   AST 21   BILITOT 1.0      Recent Labs   Lab 03/29/22 2055   INR 1.1    No results for input(s): CPK, CPKMB, TROPONINI, MB in the last 168 hours.    Electrolytes: No replacement orders  Accuchecks: Q6H    Gtts:      LDA/Wounds:  Lines/Drains/Airways       Peripheral Intravenous Line  Duration                  Peripheral IV - Single Lumen 03/29/22 2054 20 G Right Forearm <1 day         Peripheral IV - Single Lumen 03/29/22 2300 18 G Left Antecubital <1 day                  Wounds: No  Wound care consulted: No

## 2022-03-30 NOTE — ED NOTES
Patient transported at this time with IR physician and this RN on continuous cardiac, O2, and BP monitoring. NAD during transfer.

## 2022-03-31 LAB
ALBUMIN SERPL BCP-MCNC: 3.6 G/DL (ref 3.5–5.2)
ALP SERPL-CCNC: 78 U/L (ref 55–135)
ALT SERPL W/O P-5'-P-CCNC: 22 U/L (ref 10–44)
ANION GAP SERPL CALC-SCNC: 10 MMOL/L (ref 8–16)
AST SERPL-CCNC: 20 U/L (ref 10–40)
BASOPHILS # BLD AUTO: 0.04 K/UL (ref 0–0.2)
BASOPHILS NFR BLD: 0.5 % (ref 0–1.9)
BILIRUB SERPL-MCNC: 1.1 MG/DL (ref 0.1–1)
BUN SERPL-MCNC: 7 MG/DL (ref 6–20)
CALCIUM SERPL-MCNC: 9.2 MG/DL (ref 8.7–10.5)
CHLORIDE SERPL-SCNC: 107 MMOL/L (ref 95–110)
CO2 SERPL-SCNC: 21 MMOL/L (ref 23–29)
CREAT SERPL-MCNC: 0.9 MG/DL (ref 0.5–1.4)
DIFFERENTIAL METHOD: NORMAL
EOSINOPHIL # BLD AUTO: 0.2 K/UL (ref 0–0.5)
EOSINOPHIL NFR BLD: 2.1 % (ref 0–8)
ERYTHROCYTE [DISTWIDTH] IN BLOOD BY AUTOMATED COUNT: 12.6 % (ref 11.5–14.5)
EST. GFR  (AFRICAN AMERICAN): >60 ML/MIN/1.73 M^2
EST. GFR  (NON AFRICAN AMERICAN): >60 ML/MIN/1.73 M^2
GLUCOSE SERPL-MCNC: 84 MG/DL (ref 70–110)
HCT VFR BLD AUTO: 39.9 % (ref 37–48.5)
HGB BLD-MCNC: 13.4 G/DL (ref 12–16)
IMM GRANULOCYTES # BLD AUTO: 0.02 K/UL (ref 0–0.04)
IMM GRANULOCYTES NFR BLD AUTO: 0.2 % (ref 0–0.5)
LYMPHOCYTES # BLD AUTO: 2.8 K/UL (ref 1–4.8)
LYMPHOCYTES NFR BLD: 34.1 % (ref 18–48)
MAGNESIUM SERPL-MCNC: 1.7 MG/DL (ref 1.6–2.6)
MCH RBC QN AUTO: 29.8 PG (ref 27–31)
MCHC RBC AUTO-ENTMCNC: 33.6 G/DL (ref 32–36)
MCV RBC AUTO: 89 FL (ref 82–98)
MONOCYTES # BLD AUTO: 0.6 K/UL (ref 0.3–1)
MONOCYTES NFR BLD: 7 % (ref 4–15)
NEUTROPHILS # BLD AUTO: 4.5 K/UL (ref 1.8–7.7)
NEUTROPHILS NFR BLD: 56.1 % (ref 38–73)
NRBC BLD-RTO: 0 /100 WBC
PHOSPHATE SERPL-MCNC: 2.9 MG/DL (ref 2.7–4.5)
PLATELET # BLD AUTO: 176 K/UL (ref 150–450)
PMV BLD AUTO: 11.7 FL (ref 9.2–12.9)
POCT GLUCOSE: 81 MG/DL (ref 70–110)
POTASSIUM SERPL-SCNC: 4.1 MMOL/L (ref 3.5–5.1)
PROT SERPL-MCNC: 6.8 G/DL (ref 6–8.4)
RBC # BLD AUTO: 4.49 M/UL (ref 4–5.4)
SODIUM SERPL-SCNC: 138 MMOL/L (ref 136–145)
WBC # BLD AUTO: 8.09 K/UL (ref 3.9–12.7)

## 2022-03-31 PROCEDURE — 92526 ORAL FUNCTION THERAPY: CPT

## 2022-03-31 PROCEDURE — 84100 ASSAY OF PHOSPHORUS: CPT | Performed by: PHYSICIAN ASSISTANT

## 2022-03-31 PROCEDURE — 99233 PR SUBSEQUENT HOSPITAL CARE,LEVL III: ICD-10-PCS | Mod: ,,,

## 2022-03-31 PROCEDURE — 97535 SELF CARE MNGMENT TRAINING: CPT

## 2022-03-31 PROCEDURE — 92523 SPEECH SOUND LANG COMPREHEN: CPT

## 2022-03-31 PROCEDURE — 80053 COMPREHEN METABOLIC PANEL: CPT | Performed by: PHYSICIAN ASSISTANT

## 2022-03-31 PROCEDURE — 94761 N-INVAS EAR/PLS OXIMETRY MLT: CPT

## 2022-03-31 PROCEDURE — 97530 THERAPEUTIC ACTIVITIES: CPT

## 2022-03-31 PROCEDURE — 25000003 PHARM REV CODE 250: Performed by: NURSE PRACTITIONER

## 2022-03-31 PROCEDURE — 99233 PR SUBSEQUENT HOSPITAL CARE,LEVL III: ICD-10-PCS | Mod: ,,, | Performed by: PSYCHIATRY & NEUROLOGY

## 2022-03-31 PROCEDURE — 20000000 HC ICU ROOM

## 2022-03-31 PROCEDURE — 99233 SBSQ HOSP IP/OBS HIGH 50: CPT | Mod: ,,,

## 2022-03-31 PROCEDURE — 25000003 PHARM REV CODE 250: Performed by: PHYSICIAN ASSISTANT

## 2022-03-31 PROCEDURE — 83735 ASSAY OF MAGNESIUM: CPT | Performed by: PHYSICIAN ASSISTANT

## 2022-03-31 PROCEDURE — 85025 COMPLETE CBC W/AUTO DIFF WBC: CPT | Performed by: PHYSICIAN ASSISTANT

## 2022-03-31 PROCEDURE — 99233 SBSQ HOSP IP/OBS HIGH 50: CPT | Mod: ,,, | Performed by: PSYCHIATRY & NEUROLOGY

## 2022-03-31 PROCEDURE — 97116 GAIT TRAINING THERAPY: CPT

## 2022-03-31 RX ADMIN — SENNOSIDES AND DOCUSATE SODIUM 1 TABLET: 50; 8.6 TABLET ORAL at 08:03

## 2022-03-31 RX ADMIN — THYROID, PORCINE 30 MG: 30 TABLET ORAL at 06:03

## 2022-03-31 RX ADMIN — PANTOPRAZOLE SODIUM 40 MG: 40 TABLET, DELAYED RELEASE ORAL at 08:03

## 2022-03-31 RX ADMIN — ESCITALOPRAM OXALATE 20 MG: 10 TABLET ORAL at 08:03

## 2022-03-31 RX ADMIN — CLOPIDOGREL 75 MG: 75 TABLET, FILM COATED ORAL at 08:03

## 2022-03-31 RX ADMIN — ASPIRIN 81 MG CHEWABLE TABLET 81 MG: 81 TABLET CHEWABLE at 08:03

## 2022-03-31 RX ADMIN — ACETAMINOPHEN 650 MG: 325 TABLET ORAL at 01:03

## 2022-03-31 RX ADMIN — ACETAMINOPHEN 650 MG: 325 TABLET ORAL at 02:03

## 2022-03-31 RX ADMIN — ATORVASTATIN CALCIUM 40 MG: 20 TABLET, FILM COATED ORAL at 08:03

## 2022-03-31 NOTE — PROGRESS NOTES
[facility] - Neuro Critical Care  Vascular Neurology  Comprehensive Stroke Center  Progress Note    Assessment/Plan:     * Embolic stroke involving right middle cerebral artery  Dori Gamble is a 31 y.o. 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 Patient was not eligible for tPA due to eliquis. CTA at OSH with R M2/M3 occlusion. CT on arrival with early ischemic changes in R frontal lobe. Patient taken to IR now s/p TICI3. MRI with R MCA infarct and new remote microhemorrage in the r posterior temporal lobe. Echo with no PFO.    Recommend TCD to rule out PFO.      Antithrombotics for secondary stroke prevention: DAPT    Statins for secondary stroke prevention and hyperlipidemia, if present:   Statins: Atorvastatin- 40 mg daily    Aggressive risk factor modification: prior stroke and migraine     Rehab efforts: The patient has been evaluated by a stroke team provider and the therapy needs have been fully considered based off the presenting complaints and exam findings. The following therapy evaluations are needed: PT evaluate and treat, OT evaluate and treat, SLP evaluate and treat, PM&R evaluate for appropriate placement    Diagnostics ordered/pending: Lipid Profile to assess cholesterol levels, TTE to assess cardiac function/status , Other: TCD    VTE prophylaxis: Heparin 5000 units SQ every 8 hours  Mechanical prophylaxis: Place SCDs    BP parameters: Infarct: Post sucessful thrombectomy, SBP <140        Cytotoxic cerebral edema  Area of cerebral edema identified when reviewing brain imaging in the territory of the R middle cerebral artery. We will continue to monitor with q4h neuro checks while on floor or more frequently while in neuro critical care, as any change in the patients clinical exam may signify expansion of the  insult and/or the area of the edema. Such changes may require acute interventions to prevent loss of function and/or death.              Hypothyroidism  Continue home synthroid    History of stroke  Remote L PCA infarct  history of multiple strokes singe age 17 and multiple stroke-like episodes triggered by stress. She is on eliquis at home without clear indication, weak IgM cardiolipin and no clear confirmatory diagnosis for APLS  Follows with non [facility] neurology           3/30-Neuro exam stable. Pending echo. Recc TCD with microemboli to evaluate for PFO.  3/31-Echo negative PFO. Recommending TCD, US of the extremities. Will likely need a holter monitor outpatient and hypercoagulable workup.       STROKE DOCUMENTATION   Acute Stroke Times   Last Known Normal Date: 03/29/22  Last Known Normal Time: 1300  Symptom Onset Date: 03/29/22  Symptom Onset Time: 1300  Stroke Team Called Date: 03/29/22  Stroke Team Called Time: 2039  Stroke Team Arrival Date: 03/29/22  Stroke Team Arrival Time: 2039  CT completed but images not available for review - spoke to document results: 2050  Alteplase Recommended: No  CTA Interpretation Time: 1840 (OSH images reviewed prior to transfer)  Thrombectomy Recommended: Yes  Decision to Treat Time for IR: 2050    NIH Scale:  1a. Level of Consciousness: 0-->Alert, keenly responsive  1b. LOC Questions: 0-->Answers both questions correctly  1c. LOC Commands: 0-->Performs both tasks correctly  2. Best Gaze: 0-->Normal  3. Visual: 0-->No visual loss  4. Facial Palsy: 1-->Minor paralysis (flattened nasolabial fold, asymmetry on smiling)  5a. Motor Arm, Left: 1-->Drift, limb holds 90 (or 45) degrees, but drifts down before full 10 seconds, does not hit bed or other support  5b. Motor Arm, Right: 0-->No drift, limb holds 90 (or 45) degrees for full 10 secs  6a. Motor Leg, Left: 1-->Drift, leg falls by the end of the 5-sec period but does not hit bed  6b. Motor Leg, Right: 0-->No drift, leg holds  30 degree position for full 5 secs  7. Limb Ataxia: 0-->Absent  8. Sensory: 1-->Mild-to-moderate sensory loss, patient feels pinprick is less sharp or is dull on the affected side, or there is a loss of superficial pain with pinprick, but patient is aware of being touched  9. Best Language: 0-->No aphasia, normal  10. Dysarthria: 1-->Mild-to-moderate dysarthria, patient slurs at least some words and, at worst, can be understood with some difficulty  11. Extinction and Inattention (formerly Neglect): 0-->No abnormality  Total (NIH Stroke Scale): 5       Modified [place] Score: 0 (patient reports she ambulates independently since her stroke)  [place] Coma Scale:    ABCD2 Score:    [id number] Score:   HAS -BLED Score:   ICH Score:   Hunt & Melton Classification:      Hemorrhagic change of an Ischemic Stroke: Does this patient have an ischemic stroke with hemorrhagic changes? No     Neurologic Chief Complaint: slurred    Subjective:     Interval History: Patient is seen for follow-up neurological assessment and treatment recommendations: Echo negative PFO. Recommending TCD, US of the extremities. Will likely need a holter monitor outpatient and hypercoagulable workup.     HPI, Past Medical, Family, and Social History remains the same as documented in the initial encounter.     Review of Systems   Constitutional:  Negative for activity change and fatigue.   HENT:  Negative for drooling and trouble swallowing.    Eyes:  Positive for visual disturbance. Negative for photophobia.   Respiratory:  Negative for cough and shortness of breath.    Cardiovascular:  Negative for chest pain and palpitations.   Gastrointestinal:  Negative for nausea and vomiting.   Musculoskeletal:  Negative for neck pain and neck stiffness.   Skin:  Negative for rash and wound.   Neurological:  Positive for facial asymmetry, speech difficulty and weakness. Negative for headaches.   Psychiatric/Behavioral:  Negative for confusion. The patient is not  nervous/anxious.    Scheduled Meds:   aspirin  81 mg Oral Daily    atorvastatin  40 mg Oral Daily    clopidogreL  75 mg Oral Daily    EScitalopram oxalate  20 mg Oral Daily    pantoprazole  40 mg Oral Daily    senna-docusate 8.6-50 mg  1 tablet Oral Daily    thyroid (pork)  30 mg Oral Before breakfast     Continuous Infusions:  PRN Meds:acetaminophen, ondansetron, sodium chloride 0.9%    Objective:     Vital Signs (Most Recent):  Temp: 98.2 °F (36.8 °C) (03/31/22 0705)  Pulse: 60 (03/31/22 1005)  Resp: (!) 28 (03/31/22 1005)  BP: 117/62 (03/31/22 1005)  SpO2: 98 % (03/31/22 1005)  BP Location: Left arm    Vital Signs Range (Last 24H):  Temp:  [98.2 °F (36.8 °C)-99.1 °F (37.3 °C)]   Pulse:  [51-75]   Resp:  [14-32]   BP: ()/(43-84)   SpO2:  [96 %-100 %]   BP Location: Left arm    Physical Exam  Constitutional:       General: She is not in acute distress.  HENT:      Head: Normocephalic.   Eyes:      Extraocular Movements: Extraocular movements intact.   Cardiovascular:      Rate and Rhythm: Normal rate.   Pulmonary:      Effort: Pulmonary effort is normal. No respiratory distress.   Abdominal:      General: Abdomen is flat.   Musculoskeletal:      Comments: LSW   Skin:     General: Skin is warm and dry.   Neurological:      Mental Status: She is alert and oriented to person, place, and time.      Cranial Nerves: No dysarthria or facial asymmetry.      Motor: Weakness present.      Coordination: Finger-Nose-Finger Test normal.   Psychiatric:         Mood and Affect: Mood normal.       Neurological Exam:   LOC: alert  Attention Span: Good   Language: No aphasia  Articulation: Dysarthria  Orientation: Person, Place, Time   Visual Fields: Inferior quadrantanopsia: right  EOM (CN III, IV, VI): Full/intact  Facial Movement (CN VII): Lower facial weakness on the Right  Motor: Arm left  Paresis: 4/5  Leg left  Paresis: 4/5  Arm right  Normal 5/5  Leg right Normal 5/5  Cerebellum: No evidence of appendicular or  axial ataxia    Laboratory:  CMP:   Recent Labs   Lab 03/31/22 0243   CALCIUM 9.2   ALBUMIN 3.6   PROT 6.8      K 4.1   CO2 21*      BUN 7   CREATININE 0.9   ALKPHOS 78   ALT 22   AST 20   BILITOT 1.1*       BMP:   Recent Labs   Lab 03/31/22  0243      K 4.1      CO2 21*   BUN 7   CREATININE 0.9   CALCIUM 9.2       CBC:   Recent Labs   Lab 03/31/22 0243   WBC 8.09   RBC 4.49   HGB 13.4   HCT 39.9      MCV 89   MCH 29.8   MCHC 33.6       Lipid Panel:   Recent Labs   Lab 03/30/22  0005   CHOL 159   LDLCALC 87.8   HDL 54   TRIG 86       Coagulation:   Recent Labs   Lab 03/29/22 2055   INR 1.1       Platelet Aggregation Study: No results for input(s): PLTAGG, PLTAGINTERP, PLTAGREGLACO, ADPPLTAGGREG in the last 168 hours.  Hgb A1C:   Recent Labs   Lab 03/30/22  0005   HGBA1C 4.6       TSH:   Recent Labs   Lab 03/29/22 2055   TSH 9.952*         Diagnostic Results     Brain Imaging   CTH 3/29/2022  1. No acute intracranial abnormality.  2. 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    CTH 3/29/2022  Suspected early ischemic changes involving the right frontal lobe in this patient with suspected small branch vessel occlusion involving the anterior right MCA on prior outside CTA.     No acute intracranial hemorrhage.    MRI Brain 3/30/2022  Recent infarction in the right MCA distribution.  Region is grossly stable from the pre-thrombectomy CT without evidence of significant infarct extension or hemorrhagic conversion.  2. Age advanced chronic microvascular ischemic change.  3. Stable small remote lacunar-type infarcts involving the left caudate and right cerebellum as well as stable region of encephalomalacia involving the left occipital lobe.  4. New remote microhemorrhage in the right posterior temporal lobe.    Vessel Imaging   CTA 3/29/2022  The cervical vertebral and carotid arteries are patent.  Also, there is no intracranial large vessel occlusion or hemodynamically  significant stenosis.  There is no thrombosis, dissection or large aneurysm.    Cardiac Imaging   Echo 3/31/2022  · There is no evidence of intracardiac shunting.  · The left ventricle is normal in size with concentric remodeling and normal systolic function.  · The estimated ejection fraction is 65%.  · Normal left ventricular diastolic function.  · Normal right ventricular size with normal right ventricular systolic function.  · Mild mitral regurgitation.  · Normal central venous pressure (3 mmHg).  · Trivial posterior pericardial effusion.        Rosa Maria Ashley NP  [facility]  Department of Vascular Neurology   [facility] - Neuro Critical Care

## 2022-03-31 NOTE — ASSESSMENT & PLAN NOTE
Remote L PCA infarct  history of multiple strokes singe age 17 and multiple stroke-like episodes triggered by stress. She is on eliquis at home without clear indication, weak IgM cardiolipin and no clear confirmatory diagnosis for APLS  [facility]

## 2022-03-31 NOTE — SUBJECTIVE & OBJECTIVE
Review of Systems   Constitutional:  Negative for chills, fatigue and fever.   HENT:  Positive for trouble swallowing.    Eyes:  Negative for visual disturbance.   Respiratory:  Negative for cough and shortness of breath.    Cardiovascular:  Negative for chest pain.   Gastrointestinal:  Negative for abdominal pain, nausea and vomiting.   Genitourinary:  Negative for dysuria.   Musculoskeletal:  Negative for neck pain.   Neurological:  Positive for speech difficulty and weakness (left-sided). Negative for dizziness, numbness and headaches.   Psychiatric/Behavioral:  Negative for confusion.        Objective:     Vitals:  Temp: 98 °F (36.7 °C)  Pulse: (!) 54  Rhythm: sinus bradycardia  BP: 107/70  MAP (mmHg): 85  Resp: (!) 26  SpO2: 97 %  O2 Device (Oxygen Therapy): room air    Temp  Min: 98 °F (36.7 °C)  Max: 99.1 °F (37.3 °C)  Pulse  Min: 51  Max: 74  BP  Min: 92/51  Max: 120/71  MAP (mmHg)  Min: 62  Max: 99  Resp  Min: 15  Max: 32  SpO2  Min: 96 %  Max: 100 %    03/30 0701 - 03/31 0700  In: 200 [P.O.:200]  Out: 700 [Urine:700]   Unmeasured Output  Urine Occurrence: 1       Physical Exam  Vitals and nursing note reviewed.   Constitutional:       General: She is not in acute distress.     Appearance: Normal appearance.   HENT:      Head: Normocephalic and atraumatic.      Nose: Nose normal.      Mouth/Throat:      Mouth: Mucous membranes are moist.      Pharynx: Oropharynx is clear.   Cardiovascular:      Rate and Rhythm: Normal rate and regular rhythm.   Pulmonary:      Effort: Pulmonary effort is normal. No respiratory distress.   Abdominal:      General: There is no distension.   Musculoskeletal:         General: Normal range of motion.   Skin:     General: Skin is warm and dry.   Neurological:      Mental Status: She is alert.      Comments: E4 V5 M6  A&Ox4. Slurred speech. Following commands.   CN II-XII grossly intact. PERRL. EOMI. Corneal reflex, cough, and gag intact.  Moves all extremities spontaneously.      Medications:  Continuous Scheduledaspirin, 81 mg, Daily  atorvastatin, 40 mg, Daily  clopidogreL, 75 mg, Daily  EScitalopram oxalate, 20 mg, Daily  pantoprazole, 40 mg, Daily  senna-docusate 8.6-50 mg, 1 tablet, Daily  thyroid (pork), 30 mg, Before breakfast  PRNacetaminophen, 650 mg, Q6H PRN  ondansetron, 8 mg, Q8H PRN  sodium chloride 0.9%, 10 mL, PRN    Today I personally reviewed pertinent medications, lines/drains/airways, imaging, cardiology results, laboratory results,    Diet  Diet Dysphagia Mechanical Soft (IDDSI Level 5) [facility]

## 2022-03-31 NOTE — ASSESSMENT & PLAN NOTE
32 y/o female with h/o previous CVAs on apixaban (unknown reason), TBI, and hypothyroidism presents with speech difficulty, found to have R M2 occlusion and is now s/p mechanical thrombectomy of RM2 occlusion with TICI 3 reperfusion.   - Admit to NCC  - Vascular Neurology following  - SBP goal 100-140  - MRI with no hemorrhagic conversion   - TSH elevated, free t4 WNL   - LDL, A1c WNL  - q4 neuro checks, vitals, I/Os  - Post-thrombectomy RN protocol   - Continue statin, eliquis  - Echo (3/30/22): No evidence of intracardiac shunting. LV normal in size with concentric remodeling and normal systolic function. Estimated EF 65%. Normal LV diastolic function. Normal RV size with normal RV systolic function. Mild mitral regurgitation. Normal CVP (3mmHg). Trivial posterior pericardial effusion.   - PT/OT/SLP    - Stable for step down to floor on VN service

## 2022-03-31 NOTE — PLAN OF CARE
[facility] Care Plan    POC reviewed with Dori Gamble and family at 0300. Pt verbalized understanding. Questions and concerns addressed. No acute events overnight. Pt progressing toward goals. Will continue to monitor. See below and flowsheets for full assessment and VS info.       Neuro:  [place] Coma Scale  Best Eye Response: 4-->(E4) spontaneous  Best Motor Response: 6-->(M6) obeys commands  Best Verbal Response: 5-->(V5) oriented  [place] Coma Scale Score: 15  Assessment Qualifiers: no eye obstruction present  Pupil PERRLA: yes     24hr Temp:  [98.2 °F (36.8 °C)-99.1 °F (37.3 °C)]     CV:   Rhythm: normal sinus rhythm  BP goals:   SBP < 160  MAP > 65    Resp:   O2 Device (Oxygen Therapy): room air       Plan: N/A    GI/:     Diet/Nutrition Received: mechanical/dental soft  Last Bowel Movement: 03/29/22  Voiding Characteristics: voids spontaneously without difficulty    Intake/Output Summary (Last 24 hours) at 3/31/2022 0509  Last data filed at 3/30/2022 2000  Gross per 24 hour   Intake 200 ml   Output 900 ml   Net -700 ml     Unmeasured Output  Urine Occurrence: 1    Labs/Accuchecks:  Recent Labs   Lab 03/31/22  0243   WBC 8.09   RBC 4.49   HGB 13.4   HCT 39.9         Recent Labs   Lab 03/31/22  0243      K 4.1   CO2 21*      BUN 7   CREATININE 0.9   ALKPHOS 78   ALT 22   AST 20   BILITOT 1.1*      Recent Labs   Lab 03/29/22 2055   INR 1.1    No results for input(s): CPK, CPKMB, TROPONINI, MB in the last 168 hours.    Electrolytes: Electrolytes replaced  Accuchecks: Q6H    Gtts:      LDA/Wounds:  Lines/Drains/Airways       Peripheral Intravenous Line  Duration                  Peripheral IV - Single Lumen 03/29/22 2054 20 G Right Forearm 1 day         Peripheral IV - Single Lumen 03/29/22 2300 18 G Left Antecubital 1 day                  Wounds: No  Wound care consulted: No
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
## 2022-03-31 NOTE — SUBJECTIVE & OBJECTIVE
Neurologic Chief Complaint: slurred    Subjective:     Interval History: Patient is seen for follow-up neurological assessment and treatment recommendations: Echo negative PFO. Recommending TCD, US of the extremities. Will likely need a holter monitor outpatient and hypercoagulable workup.     HPI, Past Medical, Family, and Social History remains the same as documented in the initial encounter.     Review of Systems   Constitutional:  Negative for activity change and fatigue.   HENT:  Negative for drooling and trouble swallowing.    Eyes:  Positive for visual disturbance. Negative for photophobia.   Respiratory:  Negative for cough and shortness of breath.    Cardiovascular:  Negative for chest pain and palpitations.   Gastrointestinal:  Negative for nausea and vomiting.   Musculoskeletal:  Negative for neck pain and neck stiffness.   Skin:  Negative for rash and wound.   Neurological:  Positive for facial asymmetry, speech difficulty and weakness. Negative for headaches.   Psychiatric/Behavioral:  Negative for confusion. The patient is not nervous/anxious.    Scheduled Meds:   aspirin  81 mg Oral Daily    atorvastatin  40 mg Oral Daily    clopidogreL  75 mg Oral Daily    EScitalopram oxalate  20 mg Oral Daily    pantoprazole  40 mg Oral Daily    senna-docusate 8.6-50 mg  1 tablet Oral Daily    thyroid (pork)  30 mg Oral Before breakfast     Continuous Infusions:  PRN Meds:acetaminophen, ondansetron, sodium chloride 0.9%    Objective:     Vital Signs (Most Recent):  Temp: 98.2 °F (36.8 °C) (03/31/22 0705)  Pulse: 60 (03/31/22 1005)  Resp: (!) 28 (03/31/22 1005)  BP: 117/62 (03/31/22 1005)  SpO2: 98 % (03/31/22 1005)  BP Location: Left arm    Vital Signs Range (Last 24H):  Temp:  [98.2 °F (36.8 °C)-99.1 °F (37.3 °C)]   Pulse:  [51-75]   Resp:  [14-32]   BP: ()/(43-84)   SpO2:  [96 %-100 %]   BP Location: Left arm    Physical Exam  Constitutional:       General: She is not in acute distress.  HENT:      Head:  Normocephalic.   Eyes:      Extraocular Movements: Extraocular movements intact.   Cardiovascular:      Rate and Rhythm: Normal rate.   Pulmonary:      Effort: Pulmonary effort is normal. No respiratory distress.   Abdominal:      General: Abdomen is flat.   Musculoskeletal:      Comments: LSW   Skin:     General: Skin is warm and dry.   Neurological:      Mental Status: She is alert and oriented to person, place, and time.      Cranial Nerves: No dysarthria or facial asymmetry.      Motor: Weakness present.      Coordination: Finger-Nose-Finger Test normal.   Psychiatric:         Mood and Affect: Mood normal.       Neurological Exam:   LOC: alert  Attention Span: Good   Language: No aphasia  Articulation: Dysarthria  Orientation: Person, Place, Time   Visual Fields: Inferior quadrantanopsia: right  EOM (CN III, IV, VI): Full/intact  Facial Movement (CN VII): Lower facial weakness on the Right  Motor: Arm left  Paresis: 4/5  Leg left  Paresis: 4/5  Arm right  Normal 5/5  Leg right Normal 5/5  Cerebellum: No evidence of appendicular or axial ataxia    Laboratory:  CMP:   Recent Labs   Lab 03/31/22 0243   CALCIUM 9.2   ALBUMIN 3.6   PROT 6.8      K 4.1   CO2 21*      BUN 7   CREATININE 0.9   ALKPHOS 78   ALT 22   AST 20   BILITOT 1.1*       BMP:   Recent Labs   Lab 03/31/22 0243      K 4.1      CO2 21*   BUN 7   CREATININE 0.9   CALCIUM 9.2       CBC:   Recent Labs   Lab 03/31/22 0243   WBC 8.09   RBC 4.49   HGB 13.4   HCT 39.9      MCV 89   MCH 29.8   MCHC 33.6       Lipid Panel:   Recent Labs   Lab 03/30/22  0005   CHOL 159   LDLCALC 87.8   HDL 54   TRIG 86       Coagulation:   Recent Labs   Lab 03/29/22 2055   INR 1.1       Platelet Aggregation Study: No results for input(s): PLTAGG, PLTAGINTERP, PLTAGREGLACO, ADPPLTAGGREG in the last 168 hours.  Hgb A1C:   Recent Labs   Lab 03/30/22  0005   HGBA1C 4.6       TSH:   Recent Labs   Lab 03/29/22 2055   TSH 9.952*         Diagnostic  Results     Brain Imaging   CTH 3/29/2022  1. No acute intracranial abnormality.  2. 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    CTH 3/29/2022  Suspected early ischemic changes involving the right frontal lobe in this patient with suspected small branch vessel occlusion involving the anterior right MCA on prior outside CTA.     No acute intracranial hemorrhage.    MRI Brain 3/30/2022  Recent infarction in the right MCA distribution.  Region is grossly stable from the pre-thrombectomy CT without evidence of significant infarct extension or hemorrhagic conversion.  2. Age advanced chronic microvascular ischemic change.  3. Stable small remote lacunar-type infarcts involving the left caudate and right cerebellum as well as stable region of encephalomalacia involving the left occipital lobe.  4. New remote microhemorrhage in the right posterior temporal lobe.    Vessel Imaging   CTA 3/29/2022  The cervical vertebral and carotid arteries are patent.  Also, there is no intracranial large vessel occlusion or hemodynamically significant stenosis.  There is no thrombosis, dissection or large aneurysm.    Cardiac Imaging   Echo 3/31/2022  There is no evidence of intracardiac shunting.  The left ventricle is normal in size with concentric remodeling and normal systolic function.  The estimated ejection fraction is 65%.  Normal left ventricular diastolic function.  Normal right ventricular size with normal right ventricular systolic function.  Mild mitral regurgitation.  Normal central venous pressure (3 mmHg).  Trivial posterior pericardial effusion.

## 2022-03-31 NOTE — PLAN OF CARE
Problem: SLP  Goal: SLP Goal  Description: Speech-language Pathology Goals  Goals to be met by 4/6  1. Pt will participate in ongoing assessment of swallow to determine safest and least restrictive diet.  2. Pt will perform OME's 5x per session given min assist.    3. Pt will use clear speech strategies 10x per session given min assist.   Outcome: Ongoing, Progressing     Pt seen for SLC eval and swallow tx. SLP recommending upgrade to mechanical soft/thin liquid diet. Continue SLP POC.     Kasandra Bauman, S-SLP  Speech-Language Pathology     30.2

## 2022-03-31 NOTE — ASSESSMENT & PLAN NOTE
Dori Gamble is a 31 y.o. female with PMHx of L PCA stroke (baseline LSW and tingling), Multiple CVAs, hypothyroidism, migraine, TBI (last year with speech arrest) and anticoagulation use who presented to ED as transfer from [place] for possible thrombectomy. Patient was not eligible for tPA due to eliquis. CTA at OSH with R M2/M3 occlusion. CT on arrival with early ischemic changes in R frontal lobe. Patient taken to IR now s/p TICI3. MRI with R MCA infarct and new remote microhemorrage in the r posterior temporal lobe. Echo with no PFO.    Recommend TCD to rule out PFO.      Antithrombotics for secondary stroke prevention: DAPT    Statins for secondary stroke prevention and hyperlipidemia, if present:   Statins: Atorvastatin- 40 mg daily    Aggressive risk factor modification: prior stroke and migraine     Rehab efforts: The patient has been evaluated by a stroke team provider and the therapy needs have been fully considered based off the presenting complaints and exam findings. The following therapy evaluations are needed: PT evaluate and treat, OT evaluate and treat, SLP evaluate and treat, PM&R evaluate for appropriate placement    Diagnostics ordered/pending: Lipid Profile to assess cholesterol levels, TTE to assess cardiac function/status , Other: TCD    VTE prophylaxis: Heparin 5000 units SQ every 8 hours  Mechanical prophylaxis: Place SCDs    BP parameters: Infarct: Post sucessful thrombectomy, SBP <140

## 2022-03-31 NOTE — ASSESSMENT & PLAN NOTE
Evidence of previous strokes in left parieto-occipital lobe, left caudate nucleus lacunar, and right cerebellum from on MRI in 2021  - MRI brain with no hemorrhagic conversion  - Will need continued work-up and close outpatient following given recurrent strokes in this 32 yo  - Home AC restarted

## 2022-03-31 NOTE — PT/OT/SLP EVAL
Speech Language Pathology Evaluation  Cognitive Communication    Patient Name:  Dori Gamble   MRN:  [id number]  Admitting Diagnosis: Embolic stroke involving right middle cerebral artery    Recommendations:     Recommendations:                General Recommendations:  Dysphagia therapy and Speech/language therapy  Diet recommendations:  Mechanical soft, Thin   Aspiration Precautions: Straws on R side of mouth, 1 bite/sip at a time, Alternating bites/sips, HOB to 90 degrees, Meds whole 1 at a time, Small bites/sips and Standard aspiration precautions   General Precautions: Standard, aspiration, fall  Communication strategies:  provide increased time to answer and go to room if call light pushed    History:     Past Medical History:   Diagnosis Date    Anticoagulant long-term use     Anxiety     Depression     GERD (gastroesophageal reflux disease)     Hypothyroidism     Migraine headache     Stroke        Past Surgical History:   Procedure Laterality Date     SECTION      HYSTERECTOMY         Social History: Patient lives with family and children in a house in [place], [place].    Occupation/hobbies/homemaking: Prior, fully IND with ADLs and enjoys social media      Subjective     Pt awake/alert,  at bedside  Communicated to nurse prior to session     Pain/Comfort:  Pain Rating 1: 0/10  Pain Rating Post-Intervention 1: 0/10    Respiratory Status: Room air    Objective:   Cognitive Status:    Attention No obvious deficits observed    Memory Immediate Recall 3/3 IND and Delayed 3/3 IND with increased time to answer   Problem Solving Sequencing 75% acc IND, increased to 100% acc given min assist and repetition of items in sequence and Solutions 80% acc given min assist      Receptive Language:   Comprehension:      Questions Complex yes/no 100% acc IND  Open ended questions appropriate responses to all questions  Commands  complex/abstract commands 100% IND    Pragmatics:    WFL    Expressive  Language:  Verbal:    Automatic Speech  Counting 100% IND and Days of the week 100% IND  Repetition Words 50% acc IND, given mod assist to use speech strategies incr to 75% acc  Naming Confrontation 100% IND, Convergent 100% IND and Divergent responsive named 13 items in one minute      Motor Speech:  Dysarthria slow, mildly imprecise articulation  Intelligibility % in known and unknown contexts  Initiation slow to initiate    Voice:   Intensity reduced, overall WFL    Visual-Spatial:  WFL    Reading:   WFL for phrase     Written Expression:   TBA    Treatment: Pt seen for SLC eval and ongoing swallow tx. Pt and pt's  reported pt is not at speech baseline, noticing increase slurred speech and reduced rate/loudness. NSG reported pt consuming pills one by one with no difficulty. Pt consumed semi-soft solid x1 and 4oz thin liquid via straw and cup-edge. Overt coughing observed for thin liquid via cup x2. SLP encouraged pt to use straw on R side of mouth 2/2 reduced sensation on L Coughing eliminated with use of straw. SLP recommending upgrade to mechanical soft/thin liquid diet. Pt and pt's  educated on diet recs, aspiration precautions, clear speech strategies, and SLP POC. Pt left with call light in reach. Recs communicated to team.     Assessment:   Dori Gamble is a 31 y.o. female with an SLP diagnosis of Dysphagia and Dysarthria.  SLP continue to follow.    Goals:   Multidisciplinary Problems     SLP Goals        Problem: SLP    Goal Priority Disciplines Outcome   SLP Goal     SLP Ongoing, Progressing   Description: Speech-language Pathology Goals  Goals to be met by 4/6  1. Pt will participate in ongoing assessment of swallow to determine safest and least restrictive diet.  2. Pt will perform OME's 5x per session given min assist.    3. Pt will use clear speech strategies 10x per session given min assist.                    Plan:   · Patient to be seen:  4 x/week   · Plan of Care expires:   04/29/22  · Plan of Care reviewed with:  patient, spouse   · SLP Follow-Up:  Yes       Discharge recommendations:  Discharge Facility/Level of Care Needs: home health speech therapy   Barriers to Discharge:  Level of Skilled Assistance Needed      Time Tracking:   SLP Treatment Date:   03/31/22  Speech Start Time:  0824  Speech Stop Time:  0848     Speech Total Time (min):  24 min    Billable Minutes: Eval 8 , Treatment Swallowing Dysfunction 8 and Self Care/Home Management Training 8     ALEJO Weinberg-SLP  Speech-Language Pathology      03/31/2022

## 2022-03-31 NOTE — PLAN OF CARE
[facility] Care Plan    POC reviewed with Dori Gamble and family at 1400. Pt verbalized understanding. Questions and concerns addressed. No acute events today. Pt progressing toward goals. Will continue to monitor. See below and flowsheets for full assessment and VS info.       Neuro:  Gwen Coma Scale  Best Eye Response: 4-->(E4) spontaneous  Best Motor Response: 6-->(M6) obeys commands  Best Verbal Response: 5-->(V5) oriented  Gwen Coma Scale Score: 15  Assessment Qualifiers: no eye obstruction present  Pupil PERRLA: yes     24 hr Temp:  [98 °F (36.7 °C)-99.1 °F (37.3 °C)]     CV:   Rhythm: sinus bradycardia  BP goals:   SBP < 140  MAP > 65    Resp:   O2 Device (Oxygen Therapy): room air       Plan: N/A    GI/:     Diet/Nutrition Received: mechanical/dental soft  Last Bowel Movement: 03/29/22  Voiding Characteristics: hesitancy    Intake/Output Summary (Last 24 hours) at 3/31/2022 1553  Last data filed at 3/31/2022 1505  Gross per 24 hour   Intake 350 ml   Output 550 ml   Net -200 ml     Unmeasured Output  Urine Occurrence: 1    Labs/Accuchecks:  Recent Labs   Lab 03/31/22  0243   WBC 8.09   RBC 4.49   HGB 13.4   HCT 39.9         Recent Labs   Lab 03/31/22  0243      K 4.1   CO2 21*      BUN 7   CREATININE 0.9   ALKPHOS 78   ALT 22   AST 20   BILITOT 1.1*      Recent Labs   Lab 03/29/22 2055   INR 1.1    No results for input(s): CPK, CPKMB, TROPONINI, MB in the last 168 hours.    Electrolytes: N/A - electrolytes WDL  Accuchecks: none    Gtts:      LDA/Wounds:  Lines/Drains/Airways       Peripheral Intravenous Line  Duration                  Peripheral IV - Single Lumen 03/29/22 2054 20 G Right Forearm 1 day         Peripheral IV - Single Lumen 03/31/22 0610 22 G Anterior;Right Foot <1 day                  Wounds: No  Wound care consulted: No

## 2022-03-31 NOTE — PT/OT/SLP PROGRESS
"Physical Therapy Treatment    Patient Name:  Dori Gamble   MRN:  [id number]    Recommendations:     Discharge Recommendations:  home health PT, home health OT   Discharge Equipment Recommendations: walker, rolling   Barriers to discharge: Inaccessible home and Decreased caregiver support    Assessment:     Dori Gamble is a 31 y.o. female admitted with a medical diagnosis of Embolic stroke involving right middle cerebral artery. Patient demonstrates increased stability when ambulating with rolling walker. Noted increase in gait distance with decreased assist level as well.     She presents with the following impairments/functional limitations: weakness, impaired endurance, impaired sensation, impaired self care skills, impaired functional mobilty, impaired balance, gait instability, decreased upper extremity function, decreased lower extremity function, pain, impaired fine motor. These deficits affect their roles and responsibilities in which they were able to complete prior to admit. Dori Gamble would continue to benefit from acute PT intervention to improve quality of life and focus on recovery of impairments. Once medically stable, recommending pt discharge to Home Health PT.    Rehab Prognosis: Good; patient continues to benefit from acute skilled PT services to address these deficits and reach maximum level of function.  Patient remains most appropriate to discharge to home health PT, home health OT.  Recent Surgery: * No surgery found *      Plan:     During this hospitalization, patient to be seen 4 x/week to address the identified rehab impairments via gait training, therapeutic activities, therapeutic exercises, neuromuscular re-education and progress toward the following goals:    · Plan of Care Expires:  04/28/22    Subjective     Chief Complaint: Headache  Patient/Family Comments/Goals: "Can I go pee first?"  Pain/Comfort:       Objective:     Communicated with RN prior to session. Patient found " HOB elevated with telemetry, blood pressure cuff, pulse ox (continuous) upon PT entry to room.     General Precautions: Standard, aspiration, fall   Orthopedic Precautions:N/A   Braces:      Functional Mobility:  · Bed Mobility:     · Rolling Right: stand by assistance  · Supine to Sit: stand by assistance  · Transfers:     · Sit to Stand: contact guard assistance with rolling walker with cues for hand placement  · Toilet transfer: contact guard assistance with rolling walker with cues for hand placement using Step Transfer  · Gait: Patient ambulated 10 ft, 200 ft with rolling walker and stand by - contact guard assistance. Patient demonstrates occasional unsteady gait and decreased foot clearance. Cuing provided to ambulate within SINDHU of RW. All lines remained intact throughout ambulation trial, gait belt utilized, mask donned for out of room ambulation, portable monitor utilized.  · Balance:   · Static Sitting: Good, able to maintain for 3 minute(s) with supervision  · Dynamic Sitting: Fair: Patient accepts minimal challenge, supervision  · Static Standing: Good, able to maintain for 20 seconds with stand by assistance  · Dynamic Standing: Fair: Patient accepts minimal challenge, contact guard assistance    AM-PAC 6 CLICK MOBILITY        Therapeutic Activities and Exercises:  Patient educated on role of acute care PT and PT POC and safety while in hospital including calling nurse for mobility  Whiteboard updated, Patient is clear to ambulate to/from bathroom with RN/PCT, assist x1    Patient left up in chair with all lines intact, call button in reach, RN notified and spouse present. RN cleared to not activate chair alarm, patient instructed to call for assistance and verbalized understanding    GOALS:   Multidisciplinary Problems     Physical Therapy Goals        Problem: Physical Therapy    Goal Priority Disciplines Outcome Goal Variances Interventions   Physical Therapy Goal     PT, PT/OT Ongoing, Progressing      Description: Goals to be met by: 4/10/2022     Patient will increase functional independence with mobility by performin. Supine to sit with Kerr  2. Sit to supine with Kerr  3. Sit to stand transfer with Supervision  4. Gait  x 150 feet with Supervision using LRAD as needed.   5. Ascend/descend 4 stairs with bilateral Handrails Supervision using No Assistive Device.   6. Stand for ~10 minutes with Supervision using No Assistive Device to perform functional tasks.   7. Lower extremity exercise program x15 reps, with supervision, in order to increase LE strength and (I) with functional mobility.                      Time Tracking:     PT Received On: 22  PT Start Time: 1434     PT Stop Time: 1504  PT Total Time (min): 30 min     Billable Minutes: Gait Training 22 min and Therapeutic Activity 8 min     Treatment Type: Treatment  PT/PTA: PT     PTA Visit Number: 0     2022

## 2022-04-01 VITALS
HEIGHT: 63 IN | WEIGHT: 205.94 LBS | DIASTOLIC BLOOD PRESSURE: 79 MMHG | TEMPERATURE: 98 F | OXYGEN SATURATION: 98 % | BODY MASS INDEX: 36.49 KG/M2 | SYSTOLIC BLOOD PRESSURE: 132 MMHG | RESPIRATION RATE: 18 BRPM | HEART RATE: 59 BPM

## 2022-04-01 PROBLEM — E78.5 HYPERLIPEMIA: Status: ACTIVE | Noted: 2022-04-01

## 2022-04-01 LAB
ALBUMIN SERPL BCP-MCNC: 3.6 G/DL (ref 3.5–5.2)
ALP SERPL-CCNC: 76 U/L (ref 55–135)
ALT SERPL W/O P-5'-P-CCNC: 21 U/L (ref 10–44)
ANION GAP SERPL CALC-SCNC: 11 MMOL/L (ref 8–16)
APTT BLDCRRT: 27.1 SEC (ref 21–32)
AST SERPL-CCNC: 22 U/L (ref 10–40)
BASOPHILS # BLD AUTO: 0.03 K/UL (ref 0–0.2)
BASOPHILS NFR BLD: 0.5 % (ref 0–1.9)
BILIRUB SERPL-MCNC: 0.9 MG/DL (ref 0.1–1)
BUN SERPL-MCNC: 6 MG/DL (ref 6–20)
CALCIUM SERPL-MCNC: 9 MG/DL (ref 8.7–10.5)
CHLORIDE SERPL-SCNC: 106 MMOL/L (ref 95–110)
CO2 SERPL-SCNC: 20 MMOL/L (ref 23–29)
CREAT SERPL-MCNC: 0.8 MG/DL (ref 0.5–1.4)
DIFFERENTIAL METHOD: ABNORMAL
EOSINOPHIL # BLD AUTO: 0.1 K/UL (ref 0–0.5)
EOSINOPHIL NFR BLD: 2.2 % (ref 0–8)
ERYTHROCYTE [DISTWIDTH] IN BLOOD BY AUTOMATED COUNT: 12.4 % (ref 11.5–14.5)
EST. GFR  (AFRICAN AMERICAN): >60 ML/MIN/1.73 M^2
EST. GFR  (NON AFRICAN AMERICAN): >60 ML/MIN/1.73 M^2
GLUCOSE SERPL-MCNC: 82 MG/DL (ref 70–110)
HCT VFR BLD AUTO: 41.9 % (ref 37–48.5)
HGB BLD-MCNC: 13.4 G/DL (ref 12–16)
IMM GRANULOCYTES # BLD AUTO: 0.02 K/UL (ref 0–0.04)
IMM GRANULOCYTES NFR BLD AUTO: 0.3 % (ref 0–0.5)
LYMPHOCYTES # BLD AUTO: 1.6 K/UL (ref 1–4.8)
LYMPHOCYTES NFR BLD: 25.5 % (ref 18–48)
MAGNESIUM SERPL-MCNC: 1.7 MG/DL (ref 1.6–2.6)
MCH RBC QN AUTO: 29.7 PG (ref 27–31)
MCHC RBC AUTO-ENTMCNC: 32 G/DL (ref 32–36)
MCV RBC AUTO: 93 FL (ref 82–98)
MONOCYTES # BLD AUTO: 0.5 K/UL (ref 0.3–1)
MONOCYTES NFR BLD: 7.1 % (ref 4–15)
NEUTROPHILS # BLD AUTO: 4.1 K/UL (ref 1.8–7.7)
NEUTROPHILS NFR BLD: 64.4 % (ref 38–73)
NRBC BLD-RTO: 0 /100 WBC
PHOSPHATE SERPL-MCNC: 3.4 MG/DL (ref 2.7–4.5)
PLATELET # BLD AUTO: 116 K/UL (ref 150–450)
PLATELET BLD QL SMEAR: ABNORMAL
PMV BLD AUTO: 12.1 FL (ref 9.2–12.9)
POCT GLUCOSE: 85 MG/DL (ref 70–110)
POTASSIUM SERPL-SCNC: 4.1 MMOL/L (ref 3.5–5.1)
PROT SERPL-MCNC: 6.8 G/DL (ref 6–8.4)
RBC # BLD AUTO: 4.51 M/UL (ref 4–5.4)
SODIUM SERPL-SCNC: 137 MMOL/L (ref 136–145)
WBC # BLD AUTO: 6.38 K/UL (ref 3.9–12.7)

## 2022-04-01 PROCEDURE — 85610 PROTHROMBIN TIME: CPT | Performed by: NURSE PRACTITIONER

## 2022-04-01 PROCEDURE — 85730 THROMBOPLASTIN TIME PARTIAL: CPT | Mod: 91 | Performed by: NURSE PRACTITIONER

## 2022-04-01 PROCEDURE — 99233 SBSQ HOSP IP/OBS HIGH 50: CPT | Mod: ,,,

## 2022-04-01 PROCEDURE — 94761 N-INVAS EAR/PLS OXIMETRY MLT: CPT

## 2022-04-01 PROCEDURE — 97530 THERAPEUTIC ACTIVITIES: CPT

## 2022-04-01 PROCEDURE — 83735 ASSAY OF MAGNESIUM: CPT | Performed by: PHYSICIAN ASSISTANT

## 2022-04-01 PROCEDURE — 84100 ASSAY OF PHOSPHORUS: CPT | Performed by: PHYSICIAN ASSISTANT

## 2022-04-01 PROCEDURE — 86146 BETA-2 GLYCOPROTEIN ANTIBODY: CPT | Mod: 59 | Performed by: NURSE PRACTITIONER

## 2022-04-01 PROCEDURE — 25000003 PHARM REV CODE 250: Performed by: PHYSICIAN ASSISTANT

## 2022-04-01 PROCEDURE — 86147 CARDIOLIPIN ANTIBODY EA IG: CPT | Mod: 59 | Performed by: NURSE PRACTITIONER

## 2022-04-01 PROCEDURE — 85025 COMPLETE CBC W/AUTO DIFF WBC: CPT | Performed by: PSYCHIATRY & NEUROLOGY

## 2022-04-01 PROCEDURE — 25000003 PHARM REV CODE 250: Performed by: NURSE PRACTITIONER

## 2022-04-01 PROCEDURE — 99233 PR SUBSEQUENT HOSPITAL CARE,LEVL III: ICD-10-PCS | Mod: ,,,

## 2022-04-01 PROCEDURE — 80053 COMPREHEN METABOLIC PANEL: CPT | Performed by: PHYSICIAN ASSISTANT

## 2022-04-01 PROCEDURE — 97116 GAIT TRAINING THERAPY: CPT

## 2022-04-01 PROCEDURE — 99239 PR HOSPITAL DISCHARGE DAY,>30 MIN: ICD-10-PCS | Mod: ,,, | Performed by: PSYCHIATRY & NEUROLOGY

## 2022-04-01 PROCEDURE — 99239 HOSP IP/OBS DSCHRG MGMT >30: CPT | Mod: ,,, | Performed by: PSYCHIATRY & NEUROLOGY

## 2022-04-01 PROCEDURE — 63600175 PHARM REV CODE 636 W HCPCS

## 2022-04-01 RX ORDER — ATORVASTATIN CALCIUM 40 MG/1
40 TABLET, FILM COATED ORAL DAILY
Qty: 90 TABLET | Refills: 3 | Status: SHIPPED | OUTPATIENT
Start: 2022-04-02 | End: 2022-04-18 | Stop reason: SDUPTHER

## 2022-04-01 RX ORDER — CLOPIDOGREL BISULFATE 75 MG/1
75 TABLET ORAL DAILY
Qty: 30 TABLET | Refills: 0
Start: 2022-04-02 | End: 2022-04-01 | Stop reason: SDUPTHER

## 2022-04-01 RX ORDER — NAPROXEN SODIUM 220 MG/1
81 TABLET, FILM COATED ORAL DAILY
Qty: 30 TABLET | Refills: 0 | Status: SHIPPED | OUTPATIENT
Start: 2022-04-02 | End: 2022-10-05

## 2022-04-01 RX ORDER — CLOPIDOGREL BISULFATE 75 MG/1
75 TABLET ORAL DAILY
Qty: 30 TABLET | Refills: 0 | Status: SHIPPED | OUTPATIENT
Start: 2022-04-02 | End: 2022-04-18 | Stop reason: SDUPTHER

## 2022-04-01 RX ORDER — HEPARIN SODIUM 5000 [USP'U]/ML
5000 INJECTION, SOLUTION INTRAVENOUS; SUBCUTANEOUS EVERY 8 HOURS
Status: DISCONTINUED | OUTPATIENT
Start: 2022-04-01 | End: 2022-04-01 | Stop reason: HOSPADM

## 2022-04-01 RX ADMIN — ASPIRIN 81 MG CHEWABLE TABLET 81 MG: 81 TABLET CHEWABLE at 08:04

## 2022-04-01 RX ADMIN — HEPARIN SODIUM 5000 UNITS: 5000 INJECTION INTRAVENOUS; SUBCUTANEOUS at 03:04

## 2022-04-01 RX ADMIN — ATORVASTATIN CALCIUM 40 MG: 20 TABLET, FILM COATED ORAL at 08:04

## 2022-04-01 RX ADMIN — ESCITALOPRAM OXALATE 20 MG: 10 TABLET ORAL at 08:04

## 2022-04-01 RX ADMIN — THYROID, PORCINE 30 MG: 30 TABLET ORAL at 05:04

## 2022-04-01 RX ADMIN — CLOPIDOGREL 75 MG: 75 TABLET, FILM COATED ORAL at 08:04

## 2022-04-01 RX ADMIN — PANTOPRAZOLE SODIUM 40 MG: 40 TABLET, DELAYED RELEASE ORAL at 08:04

## 2022-04-01 RX ADMIN — SENNOSIDES AND DOCUSATE SODIUM 1 TABLET: 50; 8.6 TABLET ORAL at 08:04

## 2022-04-01 NOTE — SUBJECTIVE & OBJECTIVE
Neurologic Chief Complaint: slurred    Subjective:     Interval History: Patient is seen for follow-up neurological assessment and treatment recommendations: TCD negative for PFO. Has now stepdown. Therapy recommending home with home health. Hypercoagulable panel pending. Patient reports she has no insurance and might have difficulty coming back for a follow up, reports she is working on getting disability.    HPI, Past Medical, Family, and Social History remains the same as documented in the initial encounter.     Review of Systems   Constitutional:  Negative for activity change and fatigue.   HENT:  Negative for drooling and trouble swallowing.    Eyes:  Positive for visual disturbance. Negative for photophobia.   Respiratory:  Negative for cough and shortness of breath.    Cardiovascular:  Negative for chest pain and palpitations.   Gastrointestinal:  Negative for nausea and vomiting.   Musculoskeletal:  Negative for neck pain and neck stiffness.   Skin:  Negative for rash and wound.   Neurological:  Positive for facial asymmetry, speech difficulty and weakness. Negative for headaches.   Psychiatric/Behavioral:  Negative for confusion. The patient is not nervous/anxious.    Scheduled Meds:   aspirin  81 mg Oral Daily    atorvastatin  40 mg Oral Daily    clopidogreL  75 mg Oral Daily    EScitalopram oxalate  20 mg Oral Daily    heparin (porcine)  5,000 Units Subcutaneous Q8H    pantoprazole  40 mg Oral Daily    senna-docusate 8.6-50 mg  1 tablet Oral Daily    thyroid (pork)  30 mg Oral Before breakfast     Continuous Infusions:  PRN Meds:acetaminophen, ondansetron, sodium chloride 0.9%    Objective:     Vital Signs (Most Recent):  Temp: 97.4 °F (36.3 °C) (04/01/22 1158)  Pulse: (!) 56 (04/01/22 1218)  Resp: 18 (04/01/22 1158)  BP: 116/77 (04/01/22 1158)  SpO2: 97 % (04/01/22 1105)  BP Location: Left arm    Vital Signs Range (Last 24H):  Temp:  [97.4 °F (36.3 °C)-98.6 °F (37 °C)]   Pulse:  [53-72]   Resp:  [15-54]    BP: (102-139)/(56-83)   SpO2:  [95 %-100 %]   BP Location: Left arm    Physical Exam  Constitutional:       General: She is not in acute distress.  HENT:      Head: Normocephalic.   Eyes:      Extraocular Movements: Extraocular movements intact.   Cardiovascular:      Rate and Rhythm: Normal rate.   Pulmonary:      Effort: Pulmonary effort is normal. No respiratory distress.   Abdominal:      General: Abdomen is flat.   Musculoskeletal:      Comments: LSW   Skin:     General: Skin is warm and dry.   Neurological:      Mental Status: She is alert and oriented to person, place, and time.      Cranial Nerves: No dysarthria or facial asymmetry.      Motor: Weakness present.      Coordination: Finger-Nose-Finger Test normal.   Psychiatric:         Mood and Affect: Mood normal.       Neurological Exam:   LOC: alert  Attention Span: Good   Language: No aphasia  Articulation: Dysarthria  Orientation: Person, Place, Time   Visual Fields: Inferior quadrantanopsia: right  EOM (CN III, IV, VI): Full/intact  Facial Movement (CN VII): Lower facial weakness on the Right  Motor: Arm left  Paresis: 5/5  Leg left  Paresis: 5/5  Arm right  Normal 5/5  Leg right Normal 5/5  Cerebellum: No evidence of appendicular or axial ataxia    Laboratory:  CMP:   Recent Labs   Lab 04/01/22  0313   CALCIUM 9.0   ALBUMIN 3.6   PROT 6.8      K 4.1   CO2 20*      BUN 6   CREATININE 0.8   ALKPHOS 76   ALT 21   AST 22   BILITOT 0.9       BMP:   Recent Labs   Lab 04/01/22  0313      K 4.1      CO2 20*   BUN 6   CREATININE 0.8   CALCIUM 9.0       CBC:   Recent Labs   Lab 04/01/22  0541   WBC 6.38   RBC 4.51   HGB 13.4   HCT 41.9   *   MCV 93   MCH 29.7   MCHC 32.0       Lipid Panel:   Recent Labs   Lab 03/30/22  0005   CHOL 159   LDLCALC 87.8   HDL 54   TRIG 86       Coagulation:   Recent Labs   Lab 03/29/22  2055   INR 1.1       Platelet Aggregation Study: No results for input(s): PLTAGG, PLTAGINTERP, PLTAGREGLACO,  ADPPLTAGGREG in the last 168 hours.  Hgb A1C:   Recent Labs   Lab 03/30/22  0005   HGBA1C 4.6       TSH:   Recent Labs   Lab 03/29/22 2055   TSH 9.952*         Diagnostic Results     Brain Imaging   CTH 3/29/2022  1. No acute intracranial abnormality.  2. 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    CTH 3/29/2022  Suspected early ischemic changes involving the right frontal lobe in this patient with suspected small branch vessel occlusion involving the anterior right MCA on prior outside CTA.     No acute intracranial hemorrhage.    MRI Brain 3/30/2022  Recent infarction in the right MCA distribution.  Region is grossly stable from the pre-thrombectomy CT without evidence of significant infarct extension or hemorrhagic conversion.  2. Age advanced chronic microvascular ischemic change.  3. Stable small remote lacunar-type infarcts involving the left caudate and right cerebellum as well as stable region of encephalomalacia involving the left occipital lobe.  4. New remote microhemorrhage in the right posterior temporal lobe.    Vessel Imaging   CTA 3/29/2022  The cervical vertebral and carotid arteries are patent.  Also, there is no intracranial large vessel occlusion or hemodynamically significant stenosis.  There is no thrombosis, dissection or large aneurysm.    Cardiac Imaging   Echo 3/31/2022  There is no evidence of intracardiac shunting.  The left ventricle is normal in size with concentric remodeling and normal systolic function.  The estimated ejection fraction is 65%.  Normal left ventricular diastolic function.  Normal right ventricular size with normal right ventricular systolic function.  Mild mitral regurgitation.  Normal central venous pressure (3 mmHg).  Trivial posterior pericardial effusion.

## 2022-04-01 NOTE — PT/OT/SLP PROGRESS
"Physical Therapy Treatment    Patient Name:  Dori Gamble   MRN:  [id number]    Recommendations:     Discharge Recommendations:  home health PT, home health OT   Discharge Equipment Recommendations: walker, rolling   Barriers to discharge: Decreased caregiver support    Assessment:     Dori Gamble is a 31 y.o. female admitted with a medical diagnosis of Embolic stroke involving right middle cerebral artery. Patient demonstrates ability to ascend/descend 4 stairs safely this date.     She presents with the following impairments/functional limitations: weakness, impaired endurance, impaired sensation, impaired self care skills, impaired functional mobilty, impaired balance, gait instability, decreased upper extremity function, decreased lower extremity function, impaired fine motor. These deficits affect their roles and responsibilities in which they were able to complete prior to admit. Dori Gamble would continue to benefit from acute PT intervention to improve quality of life and focus on recovery of impairments. Once medically stable, recommending pt discharge to Home Health PT.    Rehab Prognosis: Good; patient continues to benefit from acute skilled PT services to address these deficits and reach maximum level of function.  Patient remains most appropriate to discharge to home health PT, home health OT.  Recent Surgery: * No surgery found *      Plan:     During this hospitalization, patient to be seen 4 x/week to address the identified rehab impairments via gait training, therapeutic activities, therapeutic exercises, neuromuscular re-education and progress toward the following goals:    · Plan of Care Expires:  04/28/22    Subjective     Chief Complaint: None verbalized    Patient/Family Comments/Goals: "It feels like I've never done it before" in reference to the stairs  Pain/Comfort:  · Pain Rating 1: 0/10  · Pain Rating Post-Intervention 1: 0/10    Objective:     Communicated with RN prior to " session. Patient found left sidelying with telemetry, blood pressure cuff, pulse ox (continuous), peripheral IV upon PT entry to room.     General Precautions: Standard, aspiration, fall   Orthopedic Precautions:N/A   Braces: N/A    Functional Mobility:  · Bed Mobility:     · Rolling Right: modified independence  · Supine to Sit: modified independence  · Sit to Supine: modified independence  · Transfers:     · Sit to Stand: stand by assistance with rolling walker, cues for hand placement  · Toilet transfer: stand by assistance with rolling walker with cues for hand placement using Step Transfer  · Gait: Patient ambulated 400 ft with rolling walker and stand by assistance. Patient demonstrates steady gait and decreased foot clearance. Cuing to ambulate within SINDHU of RW. All lines remained intact throughout ambulation trial, gait belt utilized, mask donned for out of room ambulation, portable monitor utilized.  · Balance:   · Static Sitting: Good, able to maintain for 2 minute(s) with supervision  · Dynamic Sitting: Fair: Patient accepts minimal challenge, supervision  · Static Standing: Good, able to maintain for 15 seconds with stand by assistance  · Dynamic Standing: Fair: Patient accepts minimal challenge, stand by assistance  · Stairs:  Pt ascended/descended 4 stair(s) with No Assistive Device with bilateral handrails with Stand-by Assistance - Contact Guard Assistance. Cued for non-reciprocal pattern with right leading ascending and left leading descending to maximize safety    AM-PAC 6 CLICK MOBILITY  Turning over in bed (including adjusting bedclothes, sheets and blankets)?: 4  Sitting down on and standing up from a chair with arms (e.g., wheelchair, bedside commode, etc.): 3  Moving from lying on back to sitting on the side of the bed?: 4  Moving to and from a bed to a chair (including a wheelchair)?: 3  Need to walk in hospital room?: 3  Climbing 3-5 steps with a railing?: 3  Basic Mobility Total Score:  20     Therapeutic Activities and Exercises:  Patient educated on role of acute care PT and PT POC  Whiteboard updated, Patient is clear to ambulate in hallways with RN/PCT   Educated about therapeutic exercises to be performed including standing marches, LAQ, and standing/seated heel/toe raises. Patient verbalized understanding    Patient left HOB elevated with all lines intact, call button in reach, RN notified and spouse present.    GOALS:   Multidisciplinary Problems     Physical Therapy Goals        Problem: Physical Therapy    Goal Priority Disciplines Outcome Goal Variances Interventions   Physical Therapy Goal     PT, PT/OT Ongoing, Progressing     Description: Goals to be met by: 4/10/2022     Patient will increase functional independence with mobility by performin. Supine to sit with [place]  2. Sit to supine with [place]  3. Sit to stand transfer with Supervision  4. Gait  x 150 feet with Supervision using LRAD as needed.   5. Ascend/descend 4 stairs with bilateral Handrails Supervision using No Assistive Device.   6. Stand for ~10 minutes with Supervision using No Assistive Device to perform functional tasks.   7. Lower extremity exercise program x15 reps, with supervision, in order to increase LE strength and (I) with functional mobility.                      Time Tracking:     PT Received On: 22  PT Start Time: 1017     PT Stop Time: 1050  PT Total Time (min): 33 min     Billable Minutes: Gait Training 25 min     Treatment Type: Treatment  PT/PTA: PT     PTA Visit Number: 0     2022

## 2022-04-01 NOTE — ASSESSMENT & PLAN NOTE
30 y/o female with h/o previous CVAs on apixaban (unknown reason), TBI, and hypothyroidism presents with speech difficulty, found to have R M2 occlusion and is now s/p mechanical thrombectomy of RM2 occlusion with TICI 3 reperfusion.   - Admit to NCC  - Vascular Neurology following  - SBP goal 100-140  - MRI with no hemorrhagic conversion   - TSH elevated, free t4 WNL   - LDL, A1c WNL  - q4 neuro checks, vitals, I/Os  - Post-thrombectomy RN protocol   - Continue statin, eliquis  - Echo (3/30/22): No evidence of intracardiac shunting. LV normal in size with concentric remodeling and normal systolic function. Estimated EF 65%. Normal LV diastolic function. Normal RV size with normal RV systolic function. Mild mitral regurgitation. Normal CVP (3mmHg). Trivial posterior pericardial effusion.   - PT/OT/SLP    - Stable for step down to floor on VN service

## 2022-04-01 NOTE — ASSESSMENT & PLAN NOTE
Dori Gamble is a 31 y.o. 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 Patient was not eligible for tPA due to eliquis. CTA at OSH with R M2/M3 occlusion. CT on arrival with early ischemic changes in R frontal lobe. Patient taken to IR now s/p TICI3. MRI with R MCA infarct and new remote microhemorrage in the r posterior temporal lobe. Echo with no PFO. TCD negative for PFO.     Therapy recommending home with home health. Hypercoagulable panel pending. Patient reports she has no insurance and might have difficulty coming back for a follow up, reports she is working on getting disability.    Will be discharge today.      Antithrombotics for secondary stroke prevention: DAPT    Statins for secondary stroke prevention and hyperlipidemia, if present:   Statins: Atorvastatin- 40 mg daily    Aggressive risk factor modification: prior stroke and migraine     Rehab efforts: The patient has been evaluated by a stroke team provider and the therapy needs have been fully considered based off the presenting complaints and exam findings. The following therapy evaluations are needed: PT evaluate and treat, OT evaluate and treat, SLP evaluate and treat, PM&R evaluate for appropriate placement    Diagnostics ordered/pending: None     VTE prophylaxis: Heparin 5000 units SQ every 8 hours  Mechanical prophylaxis: Place SCDs    BP parameters: Infarct: Post sucessful thrombectomy, SBP <140
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
## 2022-04-01 NOTE — PT/OT/SLP PROGRESS
Speech Language Pathology      Dori Gamble  MRN: [id number]    Patient not seen today secondary to transferring rooms on attempt. ST to f/u per POC.     Juve Mcdermott CCC-SLP  Speech-Language Pathology  Pager: [phone]

## 2022-04-01 NOTE — NURSING TRANSFER
Nursing Transfer Note      4/1/2022     Reason patient is being transferred: stepdown    Transfer To: 955    Transfer via wheelchair    Transfer with cardiac monitoring    Transported by nurse    Medicines sent: N/A    Any special needs or follow-up needed: no    Chart send with patient: Yes    Notified: spouse    Patient reassessed at: 04/01/22, 11:05    Upon arrival to floor: cardiac monitor applied, patient oriented to room, call bell in reach and bed in lowest position. Patient alert and orient. Move everything. Speech clear. Gave report at beside. Spouse at beside. Patient took all belongings.

## 2022-04-01 NOTE — PROGRESS NOTES
[facility] - Neurosurgery ([facility]  Vascular Neurology  Comprehensive Stroke Center  Progress Note    Assessment/Plan:     * Embolic stroke involving right middle cerebral artery  Dori Gamble is a 31 y.o. female with PMHx of L PCA stroke (baseline LSW and tingling), Multiple CVAs, hypothyroidism, migraine, TBI (last year with speech arrest) and anticoagulation use who presented to ED as transfer from [place] for possible thrombectomy. Patient was not eligible for tPA due to eliquis. CTA at OSH with R M2/M3 occlusion. CT on arrival with early ischemic changes in R frontal lobe. Patient taken to IR now s/p TICI3. MRI with R MCA infarct and new remote microhemorrage in the r posterior temporal lobe. Echo with no PFO. TCD negative for PFO.     Therapy recommending home with home health. Hypercoagulable panel pending. Patient reports she has no insurance and might have difficulty coming back for a follow up, reports she is working on getting disability.    Will be discharge today.      Antithrombotics for secondary stroke prevention: DAPT    Statins for secondary stroke prevention and hyperlipidemia, if present:   Statins: Atorvastatin- 40 mg daily    Aggressive risk factor modification: prior stroke and migraine     Rehab efforts: The patient has been evaluated by a stroke team provider and the therapy needs have been fully considered based off the presenting complaints and exam findings. The following therapy evaluations are needed: PT evaluate and treat, OT evaluate and treat, SLP evaluate and treat, PM&R evaluate for appropriate placement    Diagnostics ordered/pending: None     VTE prophylaxis: Heparin 5000 units SQ every 8 hours  Mechanical prophylaxis: Place SCDs    BP parameters: Infarct: Post sucessful thrombectomy, SBP <140        Hyperlipemia  Stroke risk factor  LDL 87  Goal <70  Atorvastatin 40mg    Cytotoxic cerebral edema  Area of cerebral edema identified when reviewing brain imaging in the territory  of the R middle cerebral artery. We will continue to monitor with q4h neuro checks while on floor or more frequently while in neuro critical care, as any change in the patients clinical exam may signify expansion of the insult and/or the area of the edema. Such changes may require acute interventions to prevent loss of function and/or death.              Hypothyroidism  Continue home synthroid    Anxiety  Continue home med    History of stroke  Remote L PCA infarct  history of multiple strokes singe age 17 and multiple stroke-like episodes triggered by stress. She is on eliquis at home without clear indication, weak IgM cardiolipin and no clear confirmatory diagnosis for APLS  Follows with non [facility] neurology           3/30-Neuro exam stable. Pending echo. Recc TCD with microemboli to evaluate for PFO.  3/31-Echo negative PFO. Recommending TCD, US of the extremities. Will likely need a holter monitor outpatient and hypercoagulable workup.   4/1-TCD negative for PFO. Has now stepdown. Therapy recommending home with home health. Hypercoagulable panel pending. Patient reports she has no insurance and might have difficulty coming back for a follow up, reports she is working on getting disability.      STROKE DOCUMENTATION   Acute Stroke Times   Last Known Normal Date: 03/29/22  Last Known Normal Time: 1300  Symptom Onset Date: 03/29/22  Symptom Onset Time: 1300  Stroke Team Called Date: 03/29/22  Stroke Team Called Time: 2039  Stroke Team Arrival Date: 03/29/22  Stroke Team Arrival Time: 2039  CT completed but images not available for review - spoke to document results: 2050  Alteplase Recommended: No  CTA Interpretation Time: 1840 (OSH images reviewed prior to transfer)  Thrombectomy Recommended: Yes  Decision to Treat Time for IR: 2050    NIH Scale:  1a. Level of Consciousness: 0-->Alert, keenly responsive  1b. LOC Questions: 0-->Answers both questions correctly  1c. LOC Commands: 0-->Performs both tasks  correctly  2. Best Gaze: 0-->Normal  3. Visual: 1-->Partial hemianopia  4. Facial Palsy: 1-->Minor paralysis (flattened nasolabial fold, asymmetry on smiling)  5a. Motor Arm, Left: 0-->No drift, limb holds 90 (or 45) degrees for full 10 secs  5b. Motor Arm, Right: 0-->No drift, limb holds 90 (or 45) degrees for full 10 secs  6a. Motor Leg, Left: 0-->No drift, leg holds 30 degree position for full 5 secs  6b. Motor Leg, Right: 0-->No drift, leg holds 30 degree position for full 5 secs  7. Limb Ataxia: 0-->Absent  8. Sensory: 0-->Normal, no sensory loss  9. Best Language: 0-->No aphasia, normal  10. Dysarthria: 1-->Mild-to-moderate dysarthria, patient slurs at least some words and, at worst, can be understood with some difficulty  11. Extinction and Inattention (formerly Neglect): 0-->No abnormality  Total (NIH Stroke Scale): 3       Modified [place] Score: 0 (patient reports she ambulates independently since her stroke)  Gwen Coma Scale:    ABCD2 Score:    [id number] Score:   HAS -BLED Score:   ICH Score:   Hunt & Melton Classification:      Hemorrhagic change of an Ischemic Stroke: Does this patient have an ischemic stroke with hemorrhagic changes? No     Neurologic Chief Complaint: slurred    Subjective:     Interval History: Patient is seen for follow-up neurological assessment and treatment recommendations: TCD negative for PFO. Has now stepdown. Therapy recommending home with home health. Hypercoagulable panel pending. Patient reports she has no insurance and might have difficulty coming back for a follow up, reports she is working on getting disability.    HPI, Past Medical, Family, and Social History remains the same as documented in the initial encounter.     Review of Systems   Constitutional:  Negative for activity change and fatigue.   HENT:  Negative for drooling and trouble swallowing.    Eyes:  Positive for visual disturbance. Negative for photophobia.   Respiratory:  Negative for cough and shortness  of breath.    Cardiovascular:  Negative for chest pain and palpitations.   Gastrointestinal:  Negative for nausea and vomiting.   Musculoskeletal:  Negative for neck pain and neck stiffness.   Skin:  Negative for rash and wound.   Neurological:  Positive for facial asymmetry, speech difficulty and weakness. Negative for headaches.   Psychiatric/Behavioral:  Negative for confusion. The patient is not nervous/anxious.    Scheduled Meds:   aspirin  81 mg Oral Daily    atorvastatin  40 mg Oral Daily    clopidogreL  75 mg Oral Daily    EScitalopram oxalate  20 mg Oral Daily    heparin (porcine)  5,000 Units Subcutaneous Q8H    pantoprazole  40 mg Oral Daily    senna-docusate 8.6-50 mg  1 tablet Oral Daily    thyroid (pork)  30 mg Oral Before breakfast     Continuous Infusions:  PRN Meds:acetaminophen, ondansetron, sodium chloride 0.9%    Objective:     Vital Signs (Most Recent):  Temp: 97.4 °F (36.3 °C) (04/01/22 1158)  Pulse: (!) 56 (04/01/22 1218)  Resp: 18 (04/01/22 1158)  BP: 116/77 (04/01/22 1158)  SpO2: 97 % (04/01/22 1105)  BP Location: Left arm    Vital Signs Range (Last 24H):  Temp:  [97.4 °F (36.3 °C)-98.6 °F (37 °C)]   Pulse:  [53-72]   Resp:  [15-54]   BP: (102-139)/(56-83)   SpO2:  [95 %-100 %]   BP Location: Left arm    Physical Exam  Constitutional:       General: She is not in acute distress.  HENT:      Head: Normocephalic.   Eyes:      Extraocular Movements: Extraocular movements intact.   Cardiovascular:      Rate and Rhythm: Normal rate.   Pulmonary:      Effort: Pulmonary effort is normal. No respiratory distress.   Abdominal:      General: Abdomen is flat.   Musculoskeletal:      Comments: LSW   Skin:     General: Skin is warm and dry.   Neurological:      Mental Status: She is alert and oriented to person, place, and time.      Cranial Nerves: No dysarthria or facial asymmetry.      Motor: Weakness present.      Coordination: Finger-Nose-Finger Test normal.   Psychiatric:         Mood and  Affect: Mood normal.       Neurological Exam:   LOC: alert  Attention Span: Good   Language: No aphasia  Articulation: Dysarthria  Orientation: Person, Place, Time   Visual Fields: Inferior quadrantanopsia: right  EOM (CN III, IV, VI): Full/intact  Facial Movement (CN VII): Lower facial weakness on the Right  Motor: Arm left  Paresis: 5/5  Leg left  Paresis: 5/5  Arm right  Normal 5/5  Leg right Normal 5/5  Cerebellum: No evidence of appendicular or axial ataxia    Laboratory:  CMP:   Recent Labs   Lab 04/01/22 0313   CALCIUM 9.0   ALBUMIN 3.6   PROT 6.8      K 4.1   CO2 20*      BUN 6   CREATININE 0.8   ALKPHOS 76   ALT 21   AST 22   BILITOT 0.9       BMP:   Recent Labs   Lab 04/01/22 0313      K 4.1      CO2 20*   BUN 6   CREATININE 0.8   CALCIUM 9.0       CBC:   Recent Labs   Lab 04/01/22  0541   WBC 6.38   RBC 4.51   HGB 13.4   HCT 41.9   *   MCV 93   MCH 29.7   MCHC 32.0       Lipid Panel:   Recent Labs   Lab 03/30/22  0005   CHOL 159   LDLCALC 87.8   HDL 54   TRIG 86       Coagulation:   Recent Labs   Lab 03/29/22 2055   INR 1.1       Platelet Aggregation Study: No results for input(s): PLTAGG, PLTAGINTERP, PLTAGREGLACO, ADPPLTAGGREG in the last 168 hours.  Hgb A1C:   Recent Labs   Lab 03/30/22  0005   HGBA1C 4.6       TSH:   Recent Labs   Lab 03/29/22 2055   TSH 9.952*         Diagnostic Results     Brain Imaging   CTH 3/29/2022  1. No acute intracranial abnormality.  2. 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    CTH 3/29/2022  Suspected early ischemic changes involving the right frontal lobe in this patient with suspected small branch vessel occlusion involving the anterior right MCA on prior outside CTA.     No acute intracranial hemorrhage.    MRI Brain 3/30/2022  Recent infarction in the right MCA distribution.  Region is grossly stable from the pre-thrombectomy CT without evidence of significant infarct extension or hemorrhagic conversion.  2. Age  advanced chronic microvascular ischemic change.  3. Stable small remote lacunar-type infarcts involving the left caudate and right cerebellum as well as stable region of encephalomalacia involving the left occipital lobe.  4. New remote microhemorrhage in the right posterior temporal lobe.    Vessel Imaging   CTA 3/29/2022  The cervical vertebral and carotid arteries are patent.  Also, there is no intracranial large vessel occlusion or hemodynamically significant stenosis.  There is no thrombosis, dissection or large aneurysm.    Cardiac Imaging   Echo 3/31/2022  · There is no evidence of intracardiac shunting.  · The left ventricle is normal in size with concentric remodeling and normal systolic function.  · The estimated ejection fraction is 65%.  · Normal left ventricular diastolic function.  · Normal right ventricular size with normal right ventricular systolic function.  · Mild mitral regurgitation.  · Normal central venous pressure (3 mmHg).  · Trivial posterior pericardial effusion.        Rosa Maria Ashley NP  Comprehensive Stroke Center  Department of Vascular Neurology   [facility]

## 2022-04-01 NOTE — PT/OT/SLP PROGRESS
"Occupational Therapy   Treatment    Name: Dori Gamble  MRN: [id number]  Admitting Diagnosis:  Embolic stroke involving right middle cerebral artery       Recommendations:     Discharge Recommendations: home health PT, home health OT  Discharge Equipment Recommendations:  walker, rolling  Barriers to discharge:  None    Assessment:     Dori Gamble is a 31 y.o. female with a medical diagnosis of Embolic stroke involving right middle cerebral artery.  She presents with Performance deficits affecting function are weakness, impaired endurance, impaired sensation, impaired self care skills, impaired functional mobilty, gait instability, impaired balance, decreased coordination, decreased upper extremity function, decreased lower extremity function, impaired coordination, impaired fine motor, impaired cardiopulmonary response to activity.     Rehab Prognosis:  Good; patient would benefit from acute skilled OT services to address these deficits and reach maximum level of function.       Plan:     Patient to be seen 4 x/week to address the above listed problems via self-care/home management, therapeutic activities, therapeutic exercises, neuromuscular re-education  · Plan of Care Expires: 04/30/22  · Plan of Care Reviewed with: patient, spouse    Subjective     "I just wish this had were working better" referring to LUNILESH    Pain/Comfort:  · Pain Rating 1: 0/10  · Pain Rating Post-Intervention 1: 0/10    Objective:     Communicated with: RN prior to session.  Patient found HOB elevated with peripheral IV, telemetry upon OT entry to room.    General Precautions: Standard, aspiration, fall   Orthopedic Precautions:N/A   Braces: N/A  Respiratory Status: Room air     Occupational Performance:     Bed Mobility:    · Patient completed Rolling/Turning to Left with  independence  · Patient completed Rolling/Turning to Right with independence  · Patient completed Scooting/Bridging with independence     Functional " Mobility/Transfers:  · Deferred 2* patient request; recently cmpleted w PT  · Pt seated EOB for review of theraputty and fine motor exercises for LUE    Activities of Daily Living:  · Feeding:  modified independence set up seated EOB  · Grooming: modified independence set up seated EOB; increased time and mild FM deficits noted to LUE      [facility] 6 Click ADL: 22    Treatment & Education:  -OT POC, safety during ADLs and mobility   -Education on energy conservation and task modification to maximize safety and independence  -Questions answered within OT scope of practice.  -Medium Theraputty provided with review of exercises to perform BUE, emphasis on fine motor deficits highlighted by patient.     Patient left HOB elevated with all lines intact, call button in reach, RN notified and spouse presentEducation:      GOALS:   Multidisciplinary Problems     Occupational Therapy Goals        Problem: Occupational Therapy    Goal Priority Disciplines Outcome Interventions   Occupational Therapy Goal     OT, PT/OT Ongoing, Progressing    Description: Goals to be met by: 4/30/22     Patient will increase functional independence with ADLs by performing:    UE Dressing with Modified [place].  LE Dressing with Modified [place].  Grooming while standing at sink with Modified [place].  Toileting from toilet with Modified [place] for hygiene and clothing management.   Supine to sit with Modified [place].  Step transfer with Modified [place]  Toilet transfer to toilet with Modified [place].                     Time Tracking:     OT Date of Treatment: 04/01/22  OT Start Time: 1435  OT Stop Time: 1445  OT Total Time (min): 10 min    Billable Minutes:Therapeutic Activity 10    OT/ARSLAN: OT          4/1/2022
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
## 2022-04-01 NOTE — DISCHARGE INSTRUCTIONS
Discharge summary and instructions given to and reviewed with patient and her ; both verbalize understanding.  Patient to follow up with Vascular Neurology as instructed.

## 2022-04-01 NOTE — ASSESSMENT & PLAN NOTE
Remote L PCA infarct  history of multiple strokes singe age 17 and multiple stroke-like episodes triggered by stress. She is on eliquis at home without clear indication, weak IgM cardiolipin and no clear confirmatory diagnosis for APLS  Follows with non [facility] neurology

## 2022-04-01 NOTE — PLAN OF CARE
Leon Diallo - Neurosurgery ([facility])      HOME HEALTH ORDERS  FACE TO FACE ENCOUNTER    Patient Name: Dori Gamble  YOB: 1990    PCP: Feliciano Acosta MD   PCP Address: [address] [place] [address] [place] [address]  PCP Phone Number: [phone]  PCP Fax: [phone]    Encounter Date: 3/29/22    Admit to Home Health    Diagnoses:  Active Hospital Problems    Diagnosis  POA    *Embolic stroke involving right middle cerebral artery [I63.411]  Yes    Hyperlipemia [E78.5]  Yes    Cytotoxic cerebral edema [G93.6]  Yes    History of stroke [Z86.73]  Not Applicable    Anxiety [F41.9]  Yes    Hypothyroidism [E03.9]  Yes      Resolved Hospital Problems   No resolved problems to display.       Follow Up Appointments:  No future appointments.    Allergies:  Review of patient's allergies indicates:   Allergen Reactions    Latex, natural rubber Rash       Medications: Review discharge medications with patient and family and provide education.    Current Facility-Administered Medications   Medication Dose Route Frequency Provider Last Rate Last Admin    acetaminophen tablet 650 mg  650 mg Oral Q6H PRN Adeline Rizzo PA-C   650 mg at 03/31/22 1323    aspirin chewable tablet 81 mg  81 mg Oral Daily Yulissa Miinea, NP   81 mg at 04/01/22 0819    atorvastatin tablet 40 mg  40 mg Oral Daily Adeline Rizzo PA-C   40 mg at 04/01/22 0819    clopidogreL tablet 75 mg  75 mg Oral Daily Yulissa Miinea, NP   75 mg at 04/01/22 0818    EScitalopram oxalate tablet 20 mg  20 mg Oral Daily Adeline Rizzo PA-C   20 mg at 04/01/22 0818    heparin (porcine) injection 5,000 Units  5,000 Units Subcutaneous Q8H Mary Gonzalez PA-C   5,000 Units at 04/01/22 1502    ondansetron injection 8 mg  8 mg Intravenous Q8H PRN Adeline Rizzo PA-C   8 mg at 03/30/22 1444    pantoprazole EC tablet 40 mg  40 mg Oral Daily Adeline Rizzo PA-C   40 mg at 04/01/22 0818    senna-docusate 8.6-50 mg per tablet 1  tablet  1 tablet Oral Daily Yulissa Lewis, NP   1 tablet at 04/01/22 0819    sodium chloride 0.9% flush 10 mL  10 mL Intravenous PRN Garrett Wick MD        thyroid (pork) 15 mg tablet 30 mg  30 mg Oral Before breakfast Adeline Rizzo PA-C   30 mg at 04/01/22 0540     Current Discharge Medication List      START taking these medications    Details   aspirin 81 MG Chew Take 1 tablet (81 mg total) by mouth once daily.  Qty: 30 tablet, Refills: 0      clopidogreL (PLAVIX) 75 mg tablet Take 1 tablet (75 mg total) by mouth once daily.  Qty: 30 tablet, Refills: 0         CONTINUE these medications which have CHANGED    Details   atorvastatin (LIPITOR) 40 MG tablet Take 1 tablet (40 mg total) by mouth once daily.  Qty: 90 tablet, Refills: 3         CONTINUE these medications which have NOT CHANGED    Details   atenoloL (TENORMIN) 50 MG tablet Take 1 tablet (50 mg total) by mouth once daily.  Qty: 30 tablet, Refills: 11    Comments: .  Associated Diagnoses: Intractable migraine without aura and with status migrainosus      EScitalopram oxalate (LEXAPRO) 20 MG tablet Take 1 tablet (20 mg total) by mouth once daily.  Qty: 30 tablet, Refills: 11    Associated Diagnoses: Severe episode of recurrent major depressive disorder, without psychotic features; Anxiety      NURTEC 75 mg odt Take 1 tablet by mouth.      omeprazole (PRILOSEC) 10 MG capsule Take 1 capsule (10 mg total) by mouth once daily.  Qty: 30 capsule, Refills: 11      thyroid, pork, (ARMOUR THYROID) 15 mg Tab Take 2 tablets (30 mg total) by mouth before breakfast.  Qty: 60 tablet, Refills: 11    Associated Diagnoses: Hypothyroidism, unspecified type      traZODone (DESYREL) 100 MG tablet Take 1 tablet (100 mg total) by mouth every evening.  Qty: 30 tablet, Refills: 11    Associated Diagnoses: Sleep disturbance         STOP taking these medications       apixaban (ELIQUIS) 5 mg Tab Comments:   Reason for Stopping:                 I have seen and examined  this patient within the last 30 days. My clinical findings that support the need for the home health skilled services and home bound status are the following:no   Weakness/numbness causing balance and gait disturbance due to Stroke making it taxing to leave home.  Requiring assistive device to leave home due to unsteady gait caused by  Stroke.     Diet:   Mechanical soft      Referrals/ Consults  Physical Therapy to evaluate and treat. Evaluate for home safety and equipment needs; Establish/upgrade home exercise program. Perform / instruct on therapeutic exercises, gait training, transfer training, and Range of Motion.  Occupational Therapy to evaluate and treat. Evaluate home environment for safety and equipment needs. Perform/Instruct on transfers, ADL training, ROM, and therapeutic exercises.  Speech Therapy  to evaluate and treat for  Language, Swallowing and Cognition.   to evaluate for community resources/long-range planning.    Activities:   activity as tolerated      For all post-discharge communication and subsequent orders please contact patient's primary care physician.        I certify that this patient is confined to her home and needs physical therapy, speech therapy and occupational therapy.

## 2022-04-01 NOTE — SUBJECTIVE & OBJECTIVE
Interval History: NAEON. TCDs pending. Awaiting bed for step down to vascular neurology.    Review of Systems   Constitutional:  Negative for chills, fatigue and fever.   HENT:  Positive for trouble swallowing.    Eyes:  Negative for visual disturbance.   Respiratory:  Negative for cough and shortness of breath.    Cardiovascular:  Negative for chest pain.   Gastrointestinal:  Negative for abdominal pain, nausea and vomiting.   Genitourinary:  Negative for dysuria.   Musculoskeletal:  Negative for neck pain.   Neurological:  Positive for speech difficulty and weakness (left-sided). Negative for dizziness, numbness and headaches.   Psychiatric/Behavioral:  Negative for confusion.        Objective:     Vitals:  Temp: 98.6 °F (37 °C)  Pulse: (!) 56  Rhythm: sinus bradycardia  BP: 121/77  MAP (mmHg): 95  Resp: 19  SpO2: 97 %  O2 Device (Oxygen Therapy): room air    Temp  Min: 97.9 °F (36.6 °C)  Max: 98.6 °F (37 °C)  Pulse  Min: 53  Max: 72  BP  Min: 101/74  Max: 139/69  MAP (mmHg)  Min: 73  Max: 99  Resp  Min: 15  Max: 54  SpO2  Min: 95 %  Max: 100 %    03/31 0701 - 04/01 0700  In: 650 [P.O.:650]  Out: 1100 [Urine:1100]   Unmeasured Output  Urine Occurrence: 1       Physical Exam  Vitals and nursing note reviewed.   Constitutional:       General: She is not in acute distress.     Appearance: Normal appearance.   HENT:      Head: Normocephalic and atraumatic.      Nose: Nose normal.      Mouth/Throat:      Mouth: Mucous membranes are moist.      Pharynx: Oropharynx is clear.   Cardiovascular:      Rate and Rhythm: Normal rate and regular rhythm.   Pulmonary:      Effort: Pulmonary effort is normal. No respiratory distress.   Abdominal:      General: There is no distension.   Musculoskeletal:         General: Normal range of motion.   Skin:     General: Skin is warm and dry.   Neurological:      Mental Status: She is alert.      Comments: E4 V5 M6  A&Ox4. Slurred speech. Following commands.   CN II-XII grossly intact.  PERRL. EOMI. Corneal reflex, cough, and gag intact.  Moves all extremities spontaneously.     Medications:  Continuous Scheduledaspirin, 81 mg, Daily  atorvastatin, 40 mg, Daily  clopidogreL, 75 mg, Daily  EScitalopram oxalate, 20 mg, Daily  heparin (porcine), 5,000 Units, Q8H  pantoprazole, 40 mg, Daily  senna-docusate 8.6-50 mg, 1 tablet, Daily  thyroid (pork), 30 mg, Before breakfast  PRNacetaminophen, 650 mg, Q6H PRN  ondansetron, 8 mg, Q8H PRN  sodium chloride 0.9%, 10 mL, PRN    Today I personally reviewed pertinent medications, lines/drains/airways, imaging, cardiology results, laboratory results,    Diet  Diet Dysphagia Mechanical Soft (IDDSI Level 5) [facility]

## 2022-04-01 NOTE — PLAN OF CARE
[facility] Care Plan    POC reviewed with Dori Gamble and family at 0300. Pt verbalized understanding. Questions and concerns addressed. No acute events overnight. Pt progressing toward goals. Will continue to monitor. See below and flowsheets for full assessment and VS info.       Neuro:  Winchester Coma Scale  Best Eye Response: 4-->(E4) spontaneous  Best Motor Response: 6-->(M6) obeys commands  Best Verbal Response: 5-->(V5) oriented  Winchester Coma Scale Score: 15  Assessment Qualifiers: no eye obstruction present  Pupil PERRLA: yes     24hr Temp:  [97.9 °F (36.6 °C)-98.5 °F (36.9 °C)]     CV:   Rhythm: normal sinus rhythm  BP goals:   SBP < 160  MAP > 65    Resp:   O2 Device (Oxygen Therapy): ventilator       Plan: N/A    GI/:     Diet/Nutrition Received: mechanical/dental soft  Last Bowel Movement: 03/29/22  Voiding Characteristics: voids spontaneously without difficulty    Intake/Output Summary (Last 24 hours) at 4/1/2022 0452  Last data filed at 3/31/2022 2301  Gross per 24 hour   Intake 350 ml   Output 750 ml   Net -400 ml     Unmeasured Output  Urine Occurrence: 1    Labs/Accuchecks:  Recent Labs   Lab 03/31/22  0243   WBC 8.09   RBC 4.49   HGB 13.4   HCT 39.9         Recent Labs   Lab 04/01/22  0313      K 4.1   CO2 20*      BUN 6   CREATININE 0.8   ALKPHOS 76   ALT 21   AST 22   BILITOT 0.9      Recent Labs   Lab 03/29/22 2055   INR 1.1    No results for input(s): CPK, CPKMB, TROPONINI, MB in the last 168 hours.    Electrolytes: No replacement orders  Accuchecks: Q6H    Gtts:      LDA/Wounds:  Lines/Drains/Airways       Peripheral Intravenous Line  Duration                  Peripheral IV - Single Lumen 03/29/22 2054 20 G Right Forearm 2 days         Peripheral IV - Single Lumen 03/31/22 0610 22 G Anterior;Right Foot <1 day                  Wounds: No  Wound care consulted: No

## 2022-04-04 ENCOUNTER — TELEPHONE (OUTPATIENT)
Dept: NEUROLOGY | Facility: CLINIC | Age: 32
End: 2022-04-04
Payer: OTHER MISCELLANEOUS

## 2022-04-04 LAB
CARDIOLIPIN IGG SER IA-ACNC: <9.4 GPL (ref 0–14.99)
CARDIOLIPIN IGM SER IA-ACNC: 15.7 MPL (ref 0–12.49)

## 2022-04-05 LAB
B2 GLYCOPROT1 IGA SER QL: <9 SAU
B2 GLYCOPROT1 IGG SER QL: <9 SGU
B2 GLYCOPROT1 IGM SER QL: <9 SMU

## 2022-04-05 NOTE — PHYSICIAN QUERY
PT Name: Dori Gamble  MR #: [id number]    DOCUMENTATION CLARIFICATION     CDS/: Corinna Vanessa RN, CDS               Contact information: [email or web address]     This form is a permanent document in the medical record.     Query Date: April 5, 2022    By submitting this query, we are merely seeking further clarification of documentation. Please utilize your independent clinical judgment when addressing the question(s) below.    The Medical Record contains the following:  Clinical Findings Location in Medical Record     --Embolic stroke involving right middle cerebral artery              -CTA at OSH with R M2/M3 occlusion. CT on arrival with early ischemic changes in R frontal lobe  --Cytotoxic cerebral edema              -Area of cerebral edema identified when reviewing brain imaging in the territory of the R middle cerebral artery. We will continue to monitor with q4h neuro checks while on floor or more frequently while in neuro critical care, as any change in the patients clinical exam may signify expansion of the insult and/or the area of the edema. Such changes may require acute interventions to prevent loss of function and/or death.  --NIH 5    --Cytotoxic cerebral edema             - See acute ischemic stroke            -- Stable for step down to floor on VN service                - q4 neuro checks, vitals, I/Os      Diagnosis    PRINCIPAL PROBLEM:  Embolic stroke involving right middle cerebral artery     Cytotoxic cerebral edema    --NIH 3     Vas neuro c/s 3/29                      NCC Note 3/31          Discharge Summary     -- No significant mass effect or midline shift.  --No evidence of acute hemorrhage, mass effect, or midline shift.     --The infarct territory appears grossly stable from recent CT allowing for differences in modality.  There is localized mass effect with sulcal effacement but no significant midline shift.   CTH 3/29      Brain MRI 3/30     For reporting  purposes, the definition for other diagnoses is interpreted as additional conditions that affect patient care in terms of requiring: clinical evaluation; or therapeutic treatment; or diagnostic procedures; or extended length of hospital stay; or increased nursing care and/or monitoring. (ICD-10-CM Official Guidelines for Coding and Reporting FY 2021)       After study, the diagnosis of cerebral edema is:  [   ] Clinically significant diagnosis that was monitored and/or treated as follows (please specify): ___________   [  x ] Present but was inherent to the stroke   [   ] Other explanation (please specify): _________________   [  ] Clinically Undetermined       Please document in your progress notes daily for the duration of treatment until resolved, and include in your discharge summary.    Reference:  ICD-10-CM Official Guidelines for Coding and Reporting FY 2021. (2020). Retrieved April 7, 2021, from https://www.cdc.gov/nchs/data/icd/10cmguidelines-FY2021.pdf?[email or web address]       Form No. 13022

## 2022-04-06 ENCOUNTER — HOSPITAL ENCOUNTER (EMERGENCY)
Facility: HOSPITAL | Age: 32
Discharge: HOME | End: 2022-04-07
Attending: FAMILY MEDICINE

## 2022-04-06 ENCOUNTER — NURSE TRIAGE (OUTPATIENT)
Dept: ADMINISTRATIVE | Facility: CLINIC | Age: 32
End: 2022-04-06
Payer: OTHER MISCELLANEOUS

## 2022-04-06 DIAGNOSIS — K62.5 BLOOD PER RECTUM: Primary | ICD-10-CM

## 2022-04-06 LAB
ALBUMIN SERPL BCP-MCNC: 4.4 G/DL (ref 3.5–5.2)
ALP SERPL-CCNC: 87 U/L (ref 55–135)
ALT SERPL W/O P-5'-P-CCNC: 53 U/L (ref 10–44)
ANION GAP SERPL CALC-SCNC: 12 MMOL/L (ref 8–16)
APTT BLDCRRT: 25.9 SEC (ref 21–32)
AST SERPL-CCNC: 51 U/L (ref 10–40)
B-HCG UR QL: NEGATIVE
BASOPHILS # BLD AUTO: 0.04 K/UL (ref 0–0.2)
BASOPHILS NFR BLD: 0.5 % (ref 0–1.9)
BILIRUB SERPL-MCNC: 0.8 MG/DL (ref 0.1–1)
BILIRUB UR QL STRIP: ABNORMAL
BUN SERPL-MCNC: 13 MG/DL (ref 6–20)
CALCIUM SERPL-MCNC: 9.9 MG/DL (ref 8.7–10.5)
CHLORIDE SERPL-SCNC: 108 MMOL/L (ref 95–110)
CLARITY UR: ABNORMAL
CO2 SERPL-SCNC: 21 MMOL/L (ref 23–29)
COLOR UR: YELLOW
CREAT SERPL-MCNC: 0.9 MG/DL (ref 0.5–1.4)
DIFFERENTIAL METHOD: NORMAL
EOSINOPHIL # BLD AUTO: 0.3 K/UL (ref 0–0.5)
EOSINOPHIL NFR BLD: 3.6 % (ref 0–8)
ERYTHROCYTE [DISTWIDTH] IN BLOOD BY AUTOMATED COUNT: 12.8 % (ref 11.5–14.5)
EST. GFR  (AFRICAN AMERICAN): >60 ML/MIN/1.73 M^2
EST. GFR  (NON AFRICAN AMERICAN): >60 ML/MIN/1.73 M^2
GLUCOSE SERPL-MCNC: 70 MG/DL (ref 70–110)
GLUCOSE UR QL STRIP: NEGATIVE
HCT VFR BLD AUTO: 45.4 % (ref 37–48.5)
HGB BLD-MCNC: 15.2 G/DL (ref 12–16)
HGB UR QL STRIP: ABNORMAL
IMM GRANULOCYTES # BLD AUTO: 0.02 K/UL (ref 0–0.04)
IMM GRANULOCYTES NFR BLD AUTO: 0.3 % (ref 0–0.5)
INR PPP: 1 (ref 0.8–1.2)
KETONES UR QL STRIP: NEGATIVE
LEUKOCYTE ESTERASE UR QL STRIP: NEGATIVE
LIPASE SERPL-CCNC: 28 U/L (ref 4–60)
LYMPHOCYTES # BLD AUTO: 2.9 K/UL (ref 1–4.8)
LYMPHOCYTES NFR BLD: 37.1 % (ref 18–48)
MAGNESIUM SERPL-MCNC: 1.8 MG/DL (ref 1.6–2.6)
MCH RBC QN AUTO: 30 PG (ref 27–31)
MCHC RBC AUTO-ENTMCNC: 33.5 G/DL (ref 32–36)
MCV RBC AUTO: 90 FL (ref 82–98)
MONOCYTES # BLD AUTO: 0.4 K/UL (ref 0.3–1)
MONOCYTES NFR BLD: 5.4 % (ref 4–15)
NEUTROPHILS # BLD AUTO: 4.1 K/UL (ref 1.8–7.7)
NEUTROPHILS NFR BLD: 53.1 % (ref 38–73)
NITRITE UR QL STRIP: NEGATIVE
NRBC BLD-RTO: 0 /100 WBC
OB PNL STL: POSITIVE
PH UR STRIP: 6 [PH] (ref 5–8)
PLATELET # BLD AUTO: 229 K/UL (ref 150–450)
PMV BLD AUTO: 12.5 FL (ref 9.2–12.9)
POTASSIUM SERPL-SCNC: 3.7 MMOL/L (ref 3.5–5.1)
PROT SERPL-MCNC: 8.2 G/DL (ref 6–8.4)
PROT UR QL STRIP: NEGATIVE
PROTHROMBIN TIME: 10.8 SEC (ref 9–12.5)
RBC # BLD AUTO: 5.06 M/UL (ref 4–5.4)
SODIUM SERPL-SCNC: 141 MMOL/L (ref 136–145)
SP GR UR STRIP: >=1.03 (ref 1–1.03)
TROPONIN I SERPL DL<=0.01 NG/ML-MCNC: <0.006 NG/ML (ref 0–0.03)
URN SPEC COLLECT METH UR: ABNORMAL
UROBILINOGEN UR STRIP-ACNC: NEGATIVE EU/DL
WBC # BLD AUTO: 7.74 K/UL (ref 3.9–12.7)

## 2022-04-06 PROCEDURE — 81025 URINE PREGNANCY TEST: CPT | Performed by: FAMILY MEDICINE

## 2022-04-06 PROCEDURE — 85610 PROTHROMBIN TIME: CPT | Performed by: FAMILY MEDICINE

## 2022-04-06 PROCEDURE — 96360 HYDRATION IV INFUSION INIT: CPT

## 2022-04-06 PROCEDURE — 83690 ASSAY OF LIPASE: CPT | Performed by: FAMILY MEDICINE

## 2022-04-06 PROCEDURE — 36415 COLL VENOUS BLD VENIPUNCTURE: CPT | Performed by: FAMILY MEDICINE

## 2022-04-06 PROCEDURE — 99284 EMERGENCY DEPT VISIT MOD MDM: CPT | Mod: 25

## 2022-04-06 PROCEDURE — 85025 COMPLETE CBC W/AUTO DIFF WBC: CPT | Performed by: FAMILY MEDICINE

## 2022-04-06 PROCEDURE — 84484 ASSAY OF TROPONIN QUANT: CPT | Performed by: FAMILY MEDICINE

## 2022-04-06 PROCEDURE — 80053 COMPREHEN METABOLIC PANEL: CPT | Performed by: FAMILY MEDICINE

## 2022-04-06 PROCEDURE — 81003 URINALYSIS AUTO W/O SCOPE: CPT | Performed by: FAMILY MEDICINE

## 2022-04-06 PROCEDURE — 25000003 PHARM REV CODE 250: Performed by: FAMILY MEDICINE

## 2022-04-06 PROCEDURE — 82272 OCCULT BLD FECES 1-3 TESTS: CPT | Performed by: FAMILY MEDICINE

## 2022-04-06 PROCEDURE — 83735 ASSAY OF MAGNESIUM: CPT | Performed by: FAMILY MEDICINE

## 2022-04-06 PROCEDURE — 85730 THROMBOPLASTIN TIME PARTIAL: CPT | Performed by: FAMILY MEDICINE

## 2022-04-06 RX ADMIN — SODIUM CHLORIDE 1000 ML: 0.9 INJECTION, SOLUTION INTRAVENOUS at 09:04

## 2022-04-06 NOTE — TELEPHONE ENCOUNTER
"Patient reports rectal bleeding twice today. This occurred following a BM and once after urination. Patient recently had a stroke on 3/29 and is currently on Plavix. Advised per protocol to go to the nearest ED now. Patient VU. Advised the patient to call back with any further questions or if symptoms worsen.      Reason for Disposition   [1] MODERATE rectal bleeding (small blood clots, passing blood without stool, or toilet water turns red) AND [2] more than once a day    Additional Information   Negative: Shock suspected (e.g., cold/pale/clammy skin, too weak to stand, low BP, rapid pulse)   Negative: Difficult to awaken or acting confused (e.g., disoriented, slurred speech)   Negative: Passed out (i.e., lost consciousness, collapsed and was not responding)   Negative: [1] Vomiting AND [2] contains red blood or black ("coffee ground") material  (Exception: few red streaks in vomit that only happened once)   Negative: Sounds like a life-threatening emergency to the triager   Negative: SEVERE rectal bleeding (large blood clots; on and off, or constant bleeding)   Negative: SEVERE dizziness (e.g., unable to stand, requires support to walk, feels like passing out now)    Protocols used: RECTAL BLEEDING-A-AH    "

## 2022-04-07 VITALS
DIASTOLIC BLOOD PRESSURE: 67 MMHG | HEART RATE: 61 BPM | BODY MASS INDEX: 35.44 KG/M2 | HEIGHT: 63 IN | OXYGEN SATURATION: 98 % | TEMPERATURE: 98 F | WEIGHT: 200 LBS | SYSTOLIC BLOOD PRESSURE: 102 MMHG | RESPIRATION RATE: 17 BRPM

## 2022-04-07 NOTE — DISCHARGE INSTRUCTIONS
Follow-up with gastroenterologist.  Referral has been made.  Return to ER with any concerns of worsening condition.

## 2022-04-07 NOTE — ED PROVIDER NOTES
Encounter Date: 2022       History     Chief Complaint   Patient presents with    Abdominal Pain    Rectal Bleeding     Patient comes our facility with complaints of lower abdominal pain and rectal bleeding.  Patient has had multiple strokes number in about 5 in has been on Eliquis up until recently where she was switched to Plavix.  The patient had an acute stroke last Tuesday did require intervention.  She was discharged from the hospital on .  Patient states that she noted blood in the toilet on 2 different occasions when she went to bathroom starting today.  The patient was unsure of the blood came from her urine or from her rectum.  Patient denies any history of bleeding from her bladder or rectal bleeding.  Patient denies any rectal pain.  Patient does describe a suprapubic discomfort as described as cramping starting around noon today.  Patient has been on blood thinners for over 7 years and is the 1st time she has noticed any blood per rectum.        Review of patient's allergies indicates:   Allergen Reactions    Latex, natural rubber Rash     Past Medical History:   Diagnosis Date    Anticoagulant long-term use     Anxiety     Depression     GERD (gastroesophageal reflux disease)     Hypothyroidism     Migraine headache     Stroke      Past Surgical History:   Procedure Laterality Date     SECTION      HYSTERECTOMY       Family History   Problem Relation Age of Onset    Depression Mother     Heart disease Mother     Stroke Mother     Heart disease Father     Kidney disease Sister     Depression Sister     Arthritis Maternal Grandmother      Social History     Tobacco Use    Smoking status: Former Smoker     Packs/day: 0.50     Years: 17.00     Pack years: 8.50     Types: Cigarettes     Quit date: 2021     Years since quittin.6    Smokeless tobacco: Never Used   Substance Use Topics    Alcohol use: Yes     Comment: occ.     Drug use: Not Currently      Review of Systems   Constitutional: Negative for fever.   Gastrointestinal: Positive for abdominal pain (Suprapubic cramping) and blood in stool. Negative for diarrhea, nausea and vomiting.   All other systems reviewed and are negative.      Physical Exam     Initial Vitals [04/06/22 1919]   BP Pulse Resp Temp SpO2   111/85 72 19 97.7 °F (36.5 °C) 98 %      MAP       --         Physical Exam    Nursing note and vitals reviewed.  Constitutional: She appears well-developed and well-nourished. She is not diaphoretic. No distress.   HENT:   Head: Normocephalic.   Nose: Nose normal.   Eyes: Conjunctivae and EOM are normal. Right eye exhibits no discharge. No scleral icterus.   Neck: No thyromegaly present.   Normal range of motion.  Cardiovascular: Normal rate, regular rhythm, normal heart sounds and intact distal pulses.   No murmur heard.  Pulmonary/Chest: Breath sounds normal. No respiratory distress.   Abdominal: Abdomen is soft. Bowel sounds are normal. She exhibits no distension and no mass. There is abdominal tenderness (mild SP tenderness). There is no rebound and no guarding.   Musculoskeletal:         General: No tenderness or edema. Normal range of motion.      Cervical back: Normal range of motion.     Lymphadenopathy:     She has no cervical adenopathy.   Neurological: She is alert and oriented to person, place, and time. She has normal strength. GCS score is 15. GCS eye subscore is 4. GCS verbal subscore is 5. GCS motor subscore is 6.   Skin: Skin is warm and dry. Capillary refill takes less than 2 seconds. No rash noted. No erythema.   Psychiatric: She has a normal mood and affect. Her behavior is normal. Judgment and thought content normal.         ED Course   Procedures  Labs Reviewed   OCCULT BLOOD X 1, STOOL - Abnormal; Notable for the following components:       Result Value    Occult Blood Positive (*)     All other components within normal limits   URINALYSIS, REFLEX TO URINE CULTURE - Abnormal;  Notable for the following components:    Appearance, UA Hazy (*)     Specific Gravity, UA >=1.030 (*)     Bilirubin (UA) 1+ (*)     Occult Blood UA Trace (*)     All other components within normal limits    Narrative:     Preferred Collection Type->Urine, Clean Catch  Specimen Source->Urine   COMPREHENSIVE METABOLIC PANEL - Abnormal; Notable for the following components:    CO2 21 (*)     AST 51 (*)     ALT 53 (*)     All other components within normal limits   CBC W/ AUTO DIFFERENTIAL   MAGNESIUM   PROTIME-INR   APTT   LIPASE   TROPONIN I   PREGNANCY TEST, URINE RAPID    Narrative:     Specimen Source->Urine          Imaging Results    None          Medications   sodium chloride 0.9% bolus 1,000 mL (1,000 mLs Intravenous New Bag 4/6/22 2115)     Medical Decision Making:   ED Management:  Patient describes blood in the toilet.  Patient was positive for occult blood on the stool sample.  Urine showed trace to no blood.  Patient's hemoglobin is over 15 has had showed no declined since last labs were taken.  Patient will remain on Plavix and I will refer patient to GI with an urgent referral.  I informed patient that if she continues to have bleeding or develops other symptoms such as syncope or tachycardia or shortness of breath that she should return to the ER.  Patient voiced understanding.                      Clinical Impression:   Final diagnoses:  [K62.5] Blood per rectum (Primary)          ED Disposition Condition    Discharge Stable        ED Prescriptions     None        Follow-up Information    None     Follow-up with Gastroenterology, referral made.  Return to ER if condition worsens.     Steve Singh MD  04/06/22 7219

## 2022-04-07 NOTE — TELEPHONE ENCOUNTER
Spoke with pt she stated she did go to the ED and it is a flare up with her hemorrhoids. I advised pt to get some preparation H. She stated that she will call and make an appt when she is ready.

## 2022-04-07 NOTE — ED TRIAGE NOTES
Pt c/o lower abdominal pain and rectal bleeding that started today. Pt  was seen here thurs and shipped to [facility] for a stroke.  went through groin to remove clot from temporal lobe. Discharged on Fri.

## 2022-04-08 ENCOUNTER — TELEPHONE (OUTPATIENT)
Dept: NEUROLOGY | Facility: HOSPITAL | Age: 32
End: 2022-04-08
Payer: OTHER MISCELLANEOUS

## 2022-04-08 ENCOUNTER — PATIENT MESSAGE (OUTPATIENT)
Dept: NEUROLOGY | Facility: CLINIC | Age: 32
End: 2022-04-08
Payer: OTHER MISCELLANEOUS

## 2022-04-08 LAB
APTT IMM NP PPP: ABNORMAL SEC (ref 32–48)
APTT P HEP NEUT PPP: ABNORMAL SEC (ref 32–48)
CONFIRM APTT STACLOT: ABNORMAL
DRVVT SCREEN TO CONFIRM RATIO: ABNORMAL RATIO
LA 3 SCREEN W REFLEX-IMP: ABNORMAL
LA NT DPL PPP QL: ABNORMAL
MIXING DRVVT: ABNORMAL SEC (ref 33–44)
PROTHROMBIN TIME: 13.5 SEC (ref 12–15.5)
REPTILASE TIME: ABNORMAL SEC
SCREEN APTT: 43 SEC (ref 32–48)
SCREEN DRVVT: 30 SEC (ref 33–44)
THROMBIN TIME: ABNORMAL SEC (ref 14.7–19.5)

## 2022-04-08 NOTE — TELEPHONE ENCOUNTER
Spoke with patient. Risk factors for stroke discussed with teach back method implemented. Verbalized understanding of discharge instructions and medications. Follow up appointment discussed. Warning signs were discussed with teach back method implemented. Instructed to seek medical help via 911 if new symptoms occur. Relayed no new questions or comments at this time.

## 2022-04-18 ENCOUNTER — PATIENT MESSAGE (OUTPATIENT)
Dept: FAMILY MEDICINE | Facility: CLINIC | Age: 32
End: 2022-04-18
Payer: OTHER MISCELLANEOUS

## 2022-04-18 ENCOUNTER — OFFICE VISIT (OUTPATIENT)
Dept: FAMILY MEDICINE | Facility: CLINIC | Age: 32
End: 2022-04-18

## 2022-04-18 DIAGNOSIS — E03.9 HYPOTHYROIDISM, UNSPECIFIED TYPE: ICD-10-CM

## 2022-04-18 DIAGNOSIS — T14.8XXA BRUISING: Primary | ICD-10-CM

## 2022-04-18 DIAGNOSIS — Z86.73 HISTORY OF STROKE: ICD-10-CM

## 2022-04-18 DIAGNOSIS — G43.011 INTRACTABLE MIGRAINE WITHOUT AURA AND WITH STATUS MIGRAINOSUS: ICD-10-CM

## 2022-04-18 DIAGNOSIS — F41.9 ANXIETY: ICD-10-CM

## 2022-04-18 DIAGNOSIS — F33.2 SEVERE EPISODE OF RECURRENT MAJOR DEPRESSIVE DISORDER, WITHOUT PSYCHOTIC FEATURES: ICD-10-CM

## 2022-04-18 DIAGNOSIS — G47.9 SLEEP DISTURBANCE: ICD-10-CM

## 2022-04-18 PROCEDURE — 99214 PR OFFICE/OUTPT VISIT, EST, LEVL IV, 30-39 MIN: ICD-10-PCS | Mod: 95,,, | Performed by: FAMILY MEDICINE

## 2022-04-18 PROCEDURE — 99214 OFFICE O/P EST MOD 30 MIN: CPT | Mod: 95,,, | Performed by: FAMILY MEDICINE

## 2022-04-18 RX ORDER — ESCITALOPRAM OXALATE 20 MG/1
20 TABLET ORAL DAILY
Qty: 30 TABLET | Refills: 11 | Status: SHIPPED | OUTPATIENT
Start: 2022-04-18 | End: 2023-04-18

## 2022-04-18 RX ORDER — ATORVASTATIN CALCIUM 40 MG/1
40 TABLET, FILM COATED ORAL DAILY
Qty: 90 TABLET | Refills: 3 | Status: SHIPPED | OUTPATIENT
Start: 2022-04-18 | End: 2023-04-18

## 2022-04-18 RX ORDER — TRAZODONE HYDROCHLORIDE 100 MG/1
100 TABLET ORAL NIGHTLY
Qty: 30 TABLET | Refills: 11 | Status: SHIPPED | OUTPATIENT
Start: 2022-04-18 | End: 2022-10-05 | Stop reason: SDUPTHER

## 2022-04-18 RX ORDER — ATENOLOL 50 MG/1
50 TABLET ORAL DAILY
Qty: 30 TABLET | Refills: 11 | Status: SHIPPED | OUTPATIENT
Start: 2022-04-18 | End: 2023-04-18

## 2022-04-18 RX ORDER — CLOPIDOGREL BISULFATE 75 MG/1
75 TABLET ORAL DAILY
Qty: 30 TABLET | Refills: 11 | Status: SHIPPED | OUTPATIENT
Start: 2022-04-18 | End: 2022-09-02

## 2022-04-18 NOTE — PROGRESS NOTES
[facility] - Clinic Note    Subjective    The patient location is: home  The chief complaint leading to consultation is: bruising    Visit type: audiovisual    Face to Face time with patient: 10 minutes of total time spent on the encounter, which includes face to face time and non-face to face time preparing to see the patient (eg, review of tests), Obtaining and/or reviewing separately obtained history, Documenting clinical information in the electronic or other health record, Independently interpreting results (not separately reported) and communicating results to the patient/family/caregiver, or Care coordination (not separately reported).         Each patient to whom he or she provides medical services by telemedicine is:  (1) informed of the relationship between the physician and patient and the respective role of any other health care provider with respect to management of the patient; and (2) notified that he or she may decline to receive medical services by telemedicine and may withdraw from such care at any time.    Notes:     Ms. Gamble is a 32 y.o. female who presents for a VV.     Patient reports that she was discharged from the hospital about a week and half ago and still has bruises.   She was admitted for a stroke. She was started on plavix and asa and taken off oc eliquis.   She was given lovenox injections subq while inpatient.   Reports that since discharge she continues to have bruising on her stomach. She bruises with any slight touch.   Last CBC on discharge was normal.     Needs refills of her medications. Has financial constraints therefore has not been on trazodone or lexapro. Unable to work due to stroke.     PM Dori has a past medical history of Anticoagulant long-term use, Anxiety, Depression, GERD (gastroesophageal reflux disease), Hypothyroidism, Migraine headache, and Stroke.   PSXH Dori has a past surgical history that includes Hysterectomy and  section.     Dori's family history includes Arthritis in her maternal grandmother; Depression in her mother and sister; Heart disease in her father and mother; Kidney disease in her sister; Stroke in her mother.   LUISA Meadows reports that she quit smoking about 7 months ago. Her smoking use included cigarettes. She has a 8.50 pack-year smoking history. She has never used smokeless tobacco. She reports current alcohol use. She reports previous drug use.   DAI Meadows is allergic to latex, natural rubber.   MAYANK Meadows has a current medication list which includes the following prescription(s): aspirin, atenolol, atorvastatin, clopidogrel, escitalopram oxalate, nurtec, omeprazole, thyroid (pork), and trazodone.     Review of Systems   Constitutional: Positive for activity change. Negative for unexpected weight change.   HENT: Positive for trouble swallowing. Negative for hearing loss and rhinorrhea.    Eyes: Negative for discharge and visual disturbance.   Respiratory: Negative for chest tightness and wheezing.    Cardiovascular: Positive for palpitations. Negative for chest pain.   Gastrointestinal: Positive for blood in stool and constipation. Negative for diarrhea and vomiting.   Endocrine: Negative for polydipsia and polyuria.   Genitourinary: Positive for hematuria. Negative for difficulty urinating, dysuria and menstrual problem.   Musculoskeletal: Positive for arthralgias. Negative for joint swelling and neck pain.   Neurological: Positive for weakness and headaches.   Psychiatric/Behavioral: Positive for confusion and dysphoric mood.     Objective     There were no vitals taken for this visit.    Physical Exam   Constitutional:  Non-toxic appearance. She does not appear ill. No distress.   HENT:   Head: Normocephalic and atraumatic.   Right Ear: External ear normal.   Left Ear: External ear normal.   Eyes: Right eye exhibits no discharge. Left eye exhibits no discharge.   Pulmonary/Chest: Effort normal. No respiratory  distress.   Speaks in complete sentences without notable SOB. No tachypnea.    Abdominal: Normal appearance.   Neurological: She is alert.   Skin: She is not diaphoretic.   Psychiatric: Her behavior is normal. Mood, judgment and thought content normal.      Assessment/Plan     Diagnoses and all orders for this visit:    Bruising  -due to plavix.     History of stroke  -     atorvastatin (LIPITOR) 40 MG tablet; Take 1 tablet (40 mg total) by mouth once daily.  -     clopidogreL (PLAVIX) 75 mg tablet; Take 1 tablet (75 mg total) by mouth once daily.    Sleep disturbance  -     traZODone (DESYREL) 100 MG tablet; Take 1 tablet (100 mg total) by mouth every evening.    Severe episode of recurrent major depressive disorder, without psychotic features  -     EScitalopram oxalate (LEXAPRO) 20 MG tablet; Take 1 tablet (20 mg total) by mouth once daily.    Anxiety  -     EScitalopram oxalate (LEXAPRO) 20 MG tablet; Take 1 tablet (20 mg total) by mouth once daily.    Hypothyroidism, unspecified type    Intractable migraine without aura and with status migrainosus  -     atenoloL (TENORMIN) 50 MG tablet; Take 1 tablet (50 mg total) by mouth once daily.      -refills sent to [facility] and discussed goodrx for discount.   -patient will see how much am[place] thyroid is at [place] as she has already requested a refill there.       Future Appointments   Date Time Provider Department Center   5/19/2022  8:20 AM Marisol Santo MD [facility] [id number] [place]       Feliciano Acosta MD  Family Medicine  [facility]

## 2022-04-28 ENCOUNTER — PATIENT MESSAGE (OUTPATIENT)
Dept: FAMILY MEDICINE | Facility: CLINIC | Age: 32
End: 2022-04-28
Payer: OTHER MISCELLANEOUS

## 2022-04-28 DIAGNOSIS — E03.9 HYPOTHYROIDISM, UNSPECIFIED TYPE: Primary | ICD-10-CM

## 2022-04-28 RX ORDER — LEVOTHYROXINE SODIUM 50 UG/1
50 TABLET ORAL
Qty: 30 TABLET | Refills: 11 | Status: SHIPPED | OUTPATIENT
Start: 2022-04-28 | End: 2022-04-28 | Stop reason: SDUPTHER

## 2022-04-28 RX ORDER — LEVOTHYROXINE SODIUM 50 UG/1
50 TABLET ORAL
Qty: 30 TABLET | Refills: 11 | Status: SHIPPED | OUTPATIENT
Start: 2022-04-28 | End: 2022-08-10 | Stop reason: DRUGHIGH

## 2022-05-03 ENCOUNTER — PATIENT MESSAGE (OUTPATIENT)
Dept: NEUROLOGY | Facility: HOSPITAL | Age: 32
End: 2022-05-03
Payer: OTHER MISCELLANEOUS

## 2022-05-18 ENCOUNTER — TELEPHONE (OUTPATIENT)
Dept: NEUROLOGY | Facility: CLINIC | Age: 32
End: 2022-05-18

## 2022-05-18 NOTE — TELEPHONE ENCOUNTER
----- Message from Gale Carr sent at 5/18/2022 10:41 AM CDT -----  Regarding: No Insurance  Good Morning :    Wc's   will not  cover  this and  spoke  with  patient  and  she  says   she  has  no  insurance .  She  would  like  the  office  to  call  her  please  about  her  appointment  and  how  much  it  would  be,           Thank  you   Gale Carr   [facility]

## 2022-05-19 ENCOUNTER — OFFICE VISIT (OUTPATIENT)
Dept: NEUROLOGY | Facility: CLINIC | Age: 32
End: 2022-05-19

## 2022-05-19 VITALS
BODY MASS INDEX: 34.86 KG/M2 | WEIGHT: 196.75 LBS | HEIGHT: 63 IN | DIASTOLIC BLOOD PRESSURE: 72 MMHG | SYSTOLIC BLOOD PRESSURE: 105 MMHG | HEART RATE: 62 BPM

## 2022-05-19 DIAGNOSIS — G62.9 PERIPHERAL POLYNEUROPATHY: ICD-10-CM

## 2022-05-19 DIAGNOSIS — I63.411 EMBOLIC STROKE INVOLVING RIGHT MIDDLE CEREBRAL ARTERY: Primary | ICD-10-CM

## 2022-05-19 PROCEDURE — 99999 PR PBB SHADOW E&M-EST. PATIENT-LVL IV: ICD-10-PCS | Mod: PBBFAC,,, | Performed by: STUDENT IN AN ORGANIZED HEALTH CARE EDUCATION/TRAINING PROGRAM

## 2022-05-19 PROCEDURE — 99214 OFFICE O/P EST MOD 30 MIN: CPT | Mod: PBBFAC | Performed by: STUDENT IN AN ORGANIZED HEALTH CARE EDUCATION/TRAINING PROGRAM

## 2022-05-19 PROCEDURE — 99999 PR PBB SHADOW E&M-EST. PATIENT-LVL IV: CPT | Mod: PBBFAC,,, | Performed by: STUDENT IN AN ORGANIZED HEALTH CARE EDUCATION/TRAINING PROGRAM

## 2022-05-19 PROCEDURE — 99214 PR OFFICE/OUTPT VISIT, EST, LEVL IV, 30-39 MIN: ICD-10-PCS | Mod: S$PBB,,, | Performed by: PSYCHIATRY & NEUROLOGY

## 2022-05-19 PROCEDURE — 99214 OFFICE O/P EST MOD 30 MIN: CPT | Mod: S$PBB,,, | Performed by: PSYCHIATRY & NEUROLOGY

## 2022-05-19 RX ORDER — DULOXETIN HYDROCHLORIDE 20 MG/1
20 CAPSULE, DELAYED RELEASE ORAL DAILY
Qty: 30 CAPSULE | Refills: 11 | Status: SHIPPED | OUTPATIENT
Start: 2022-05-19 | End: 2022-06-30 | Stop reason: SDUPTHER

## 2022-05-19 NOTE — PROGRESS NOTES
[facility] - NEUROLOGY [facility]TH [place] [facility] [place]    Date: 5/19/22  Patient Name: Dori Gamble   MRN: [id number]   PCP: Feliciano Acosta  Referring Provider: Julián Schrader PA-C    Assessment:   Dori Gamble is a 32 y.o. female presenting to clinic following recent hospitalization. See HPI for full detail. Patients stroke still of undetermined etiology. Given multiple strokes over her lifetime in different vascular territories, etiology appears most likely to be embolic vs hypercoagulability of blood. 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 She had been taking Eliquis in the past and had an acute stroke while taking the medication in 4/2022 - however this was discontinued during her hospitalization. She is currently taking DAPT. She would benefit from further recommendation from hematology regarding possible AC use in the setting of repeated stroke with unclear etiology at a young age and abnormal APA of undetermined significance. It is the neurologic recommendation that the patient start warfarin at this time for stroke prevention, however patient would like the opinion and possible further workup by hematology. Additionally, the patient states that she would prefer to start any anticoagulant after having insurance due to cost. Additionally discussed potential need for repeat RADHA (to r/o shunt) given lack of medical records, however patient deferred until later time given lack of insurance.  With regards to the patients b/l LE pain, it seems consistent with a peripheral neuropathy. Given lack of claudication or other vascular findings at this time - the patient may benefit from cymbalta for both neuropathic pain and mood, as well as workup for reversible causes of polyneuropathy. Will defer to hematology with regard to need for LE vessel imaging out of concern for a possible vascular cause of her LE pain which is worse  with exertion.    Plan:     Ambulatory referral to hematology  Continue DAPT and statin  Started on Cymbalta   Follow up in 3 months  Discussed recommendation for repeat RADHA and warfarin, as discussed above - however deferred until later date given lack of insurance  Patient to send NCS/EMG to clinic for review (completed at OSH).  Patient to call or message with any questions or concerns  Will follow up ordered labs for neuropathy workup  She and  voiced understanding of and agreement with this plan as outlined    Problem List Items Addressed This Visit        Neuro    Embolic stroke involving right middle cerebral artery - Primary    Relevant Orders    VITAMIN B1    VITAMIN B12    METHYLMALONIC ACID, SERUM    VITAMIN B6    Calcitriol (1,25 di-OH Vitamin D)    Ambulatory referral/consult to Hematology / Oncology      Other Visit Diagnoses     Peripheral polyneuropathy              Pradip Restrepo MD    Patient note was created using MModal Dictation.  Any errors in syntax or even information may not have been identified and edited on initial review prior to signing this note.  Subjective:   Patient seen in consultation at the request of Julián Schrader PA-C for the evaluation of stroke followup. A copy of this note will be sent to the referring physician.        HPI:   Ms. Dori Gamble is a 32 y.o. female with prior L PCA stroke, repeated symptoms of CVA (starting at age 17) complicated by possible stress-related events, hypothyroidism, migraines and TBI who is presenting to clinic for follow up after a recent hospitalization. The patient had recently been discharged in 4/2022 after being treated for an acute stroke. At that time, the patient had acute onset symptoms of R MCA syndrome for which she could not get tPA (on AC at the time) but had thrombectomy with TICI 3 reperfusion of a R M2/3 thrombus. Subsequent MRI noted R MCA infarct with microhemmorhage in the right posterior temporal lobe. Further  workup while inpatient included a TTE (no significant findings), and TCD (no shunt identified). Patient reportedly had a RADHA at OSH, however records not currently available. No further abnormalities were noted during angio at time of thrombectomy. Eliquis was discontinued during hospitalization as it was unclear what indication the patient had for the medication (had a previously abnormal APA of questionable significance). She was discharged on DAPT and Statin and home health. NIH was 3 at time of discharge due to facial droop, dysarthria and a partial hemianopia. The patient reports having had difficulty with walking since discharge, but notes that it had remained unchanged since TBI in fall of 2021. Additionally she reported some b/l LE parasthesias and bilateral sciatic pain (R>L). She states that it feels as though her legs are heavy and numb and that they will oftentimes have feelings of pins and needles. She reports this pain to be intermittent in nature but states that it is most often present to varying degrees of severity. She additionally notes that it is exacerbated by prolonged activity on her feet but notes that she does still have the abnormal sensation and pain at rest as well. NCS/EMG completed at OSH, however records not currently available. She and  both stated that the patient had also had difficulties with memory since her TBI but note that it was further exacerbated since her stroke. Finally, they also state that the patient has been increasingly emotional since discharge from the hospital; Patient was tearful in the exam room as well.     NIH in clinic remained 3, stable since discharge. Several labs, ordered during hospitalization, had resulted. As part of her previously ordered hypercoaguable workup, the patients APA had returned abnormal once again, but with lower titers. The patient is currently uninsured and therefore is hesitant to try too much which would cost very much prior to  obtaining insurance.      PAST MEDICAL HISTORY:  Past Medical History:   Diagnosis Date    Anticoagulant long-term use     Anxiety     Depression     GERD (gastroesophageal reflux disease)     Hypothyroidism     Migraine headache     Stroke        PAST SURGICAL HISTORY:  Past Surgical History:   Procedure Laterality Date     SECTION      HYSTERECTOMY         CURRENT MEDS:  Current Outpatient Medications   Medication Sig Dispense Refill    aspirin 81 MG Chew Take 1 tablet (81 mg total) by mouth once daily. 30 tablet 0    atorvastatin (LIPITOR) 40 MG tablet Take 1 tablet (40 mg total) by mouth once daily. 90 tablet 3    clopidogreL (PLAVIX) 75 mg tablet Take 1 tablet (75 mg total) by mouth once daily. 30 tablet 11    atenoloL (TENORMIN) 50 MG tablet Take 1 tablet (50 mg total) by mouth once daily. 30 tablet 11    DULoxetine (CYMBALTA) 20 MG capsule Take 1 capsule (20 mg total) by mouth once daily. 30 capsule 11    EScitalopram oxalate (LEXAPRO) 20 MG tablet Take 1 tablet (20 mg total) by mouth once daily. (Patient not taking: Reported on 2022) 30 tablet 11    levothyroxine (SYNTHROID) 50 MCG tablet Take 1 tablet (50 mcg total) by mouth before breakfast. 30 tablet 11    NURTEC 75 mg odt Take 1 tablet by mouth.      omeprazole (PRILOSEC) 10 MG capsule Take 1 capsule (10 mg total) by mouth once daily. 30 capsule 11    traZODone (DESYREL) 100 MG tablet Take 1 tablet (100 mg total) by mouth every evening. 30 tablet 11     No current facility-administered medications for this visit.       ALLERGIES:  Review of patient's allergies indicates:   Allergen Reactions    Latex, natural rubber Rash    Latex Hives and Rash       FAMILY HISTORY:  Family History   Problem Relation Age of Onset    Depression Mother     Heart disease Mother     Stroke Mother     Heart disease Father     Kidney disease Sister     Depression Sister     Arthritis Maternal Grandmother        SOCIAL HISTORY:  Social  "History     Tobacco Use    Smoking status: Former Smoker     Packs/day: 0.50     Years: 17.00     Pack years: 8.50     Types: Cigarettes     Quit date: 2021     Years since quittin.7    Smokeless tobacco: Never Used   Substance Use Topics    Alcohol use: Yes     Comment: occ.     Drug use: Not Currently       Review of Systems:  12 system review of systems is negative except for the symptoms mentioned in HPI.      Objective:     Vitals:    22 0953   BP: 105/72   Pulse: 62   Weight: 89.3 kg (196 lb 12.2 oz)   Height: 5' 3" (1.6 m)     General: NAD, well nourished   Eyes: no tearing, discharge, no erythema   ENT: moist mucous membranes of the oral cavity, nares patent    Neck: Supple, full range of motion  Cardiovascular: Warm and well perfused, pulses equal and symmetrical  Lungs: Normal work of breathing, normal chest wall excursions  Skin: No rash, lesions, or breakdown on exposed skin  Psychiatry: Increased mood lability and crying.   Abdomen: soft, non tender, non distended  Extremeties: No cyanosis, clubbing or edema.    Neurological   MENTAL STATUS: Alert and oriented to person, place, and time. Attention and concentration within normal limits. Speech without dysarthria, able to name and repeat without difficulty. Recent and remote memory within normal limits   CRANIAL NERVES: Nearly complete R hemianopia. PERRL. EOMI. Facial sensation intact. Mild L facial droop. Hearing grossly intact. Full shoulder shrug bilaterally. Tongue protrudes midline   SENSORY: Sensation is intact to light touch and vibration throughout.  Joint position perception intact. Negative Romberg.   MOTOR: Normal bulk and tone. No pronator drift.  5/5 deltoid, biceps, triceps, interosseous, hand  bilaterally. 5/5 iliopsoas, knee extension/flexion, foot dorsi/plantarflexion bilaterally.    REFLEXES: Symmetric and 2+ throughout. Toes down going bilaterally.   CEREBELLAR/COORDINATION/GAIT: Gait slowed with normal arm " swing and decreased stride length. Finger to nose intact. Normal rapid alternating movements.

## 2022-05-25 ENCOUNTER — LAB VISIT (OUTPATIENT)
Dept: LAB | Facility: HOSPITAL | Age: 32
End: 2022-05-25
Attending: STUDENT IN AN ORGANIZED HEALTH CARE EDUCATION/TRAINING PROGRAM

## 2022-05-25 DIAGNOSIS — I63.411 EMBOLIC STROKE INVOLVING RIGHT MIDDLE CEREBRAL ARTERY: ICD-10-CM

## 2022-05-25 LAB — VIT B12 SERPL-MCNC: 232 PG/ML (ref 210–950)

## 2022-05-25 PROCEDURE — 84207 ASSAY OF VITAMIN B-6: CPT | Performed by: STUDENT IN AN ORGANIZED HEALTH CARE EDUCATION/TRAINING PROGRAM

## 2022-05-25 PROCEDURE — 84425 ASSAY OF VITAMIN B-1: CPT | Performed by: STUDENT IN AN ORGANIZED HEALTH CARE EDUCATION/TRAINING PROGRAM

## 2022-05-25 PROCEDURE — 82607 VITAMIN B-12: CPT | Performed by: STUDENT IN AN ORGANIZED HEALTH CARE EDUCATION/TRAINING PROGRAM

## 2022-05-25 PROCEDURE — 83921 ORGANIC ACID SINGLE QUANT: CPT | Performed by: STUDENT IN AN ORGANIZED HEALTH CARE EDUCATION/TRAINING PROGRAM

## 2022-06-01 LAB
METHYLMALONATE SERPL-SCNC: 0.35 UMOL/L
PYRIDOXAL SERPL-MCNC: 7 UG/L (ref 5–50)

## 2022-06-02 LAB — VIT B1 BLD-MCNC: 69 UG/L (ref 38–122)

## 2022-06-06 ENCOUNTER — PATIENT MESSAGE (OUTPATIENT)
Dept: NEUROLOGY | Facility: CLINIC | Age: 32
End: 2022-06-06
Payer: OTHER MISCELLANEOUS

## 2022-06-06 RX ORDER — UBIDECARENONE 75 MG
1000 CAPSULE ORAL DAILY
Qty: 60 TABLET | Refills: 2 | Status: SHIPPED | OUTPATIENT
Start: 2022-06-06 | End: 2022-06-30 | Stop reason: SDUPTHER

## 2022-06-07 ENCOUNTER — OFFICE VISIT (OUTPATIENT)
Dept: FAMILY MEDICINE | Facility: CLINIC | Age: 32
End: 2022-06-07

## 2022-06-07 ENCOUNTER — PATIENT MESSAGE (OUTPATIENT)
Dept: FAMILY MEDICINE | Facility: CLINIC | Age: 32
End: 2022-06-07
Payer: OTHER MISCELLANEOUS

## 2022-06-07 VITALS
HEIGHT: 63 IN | HEART RATE: 80 BPM | SYSTOLIC BLOOD PRESSURE: 111 MMHG | WEIGHT: 194.25 LBS | OXYGEN SATURATION: 97 % | DIASTOLIC BLOOD PRESSURE: 77 MMHG | BODY MASS INDEX: 34.42 KG/M2 | RESPIRATION RATE: 15 BRPM

## 2022-06-07 DIAGNOSIS — G47.9 SLEEP DISTURBANCE: ICD-10-CM

## 2022-06-07 DIAGNOSIS — F33.2 SEVERE EPISODE OF RECURRENT MAJOR DEPRESSIVE DISORDER, WITHOUT PSYCHOTIC FEATURES: ICD-10-CM

## 2022-06-07 DIAGNOSIS — F41.9 ANXIETY: ICD-10-CM

## 2022-06-07 DIAGNOSIS — R35.0 URINARY FREQUENCY: Primary | ICD-10-CM

## 2022-06-07 LAB
BILIRUB UR QL STRIP: NEGATIVE
CLARITY UR: CLEAR
COLOR UR: YELLOW
GLUCOSE UR QL STRIP: NEGATIVE
HGB UR QL STRIP: ABNORMAL
KETONES UR QL STRIP: NEGATIVE
LEUKOCYTE ESTERASE UR QL STRIP: NEGATIVE
NITRITE UR QL STRIP: NEGATIVE
PH UR STRIP: 6 [PH] (ref 5–8)
PROT UR QL STRIP: NEGATIVE
SP GR UR STRIP: 1.01 (ref 1–1.03)
URN SPEC COLLECT METH UR: ABNORMAL
UROBILINOGEN UR STRIP-ACNC: NEGATIVE EU/DL

## 2022-06-07 PROCEDURE — 99214 OFFICE O/P EST MOD 30 MIN: CPT | Mod: PBBFAC | Performed by: FAMILY MEDICINE

## 2022-06-07 PROCEDURE — 99999 PR PBB SHADOW E&M-EST. PATIENT-LVL IV: ICD-10-PCS | Mod: PBBFAC,,, | Performed by: FAMILY MEDICINE

## 2022-06-07 PROCEDURE — 81003 URINALYSIS AUTO W/O SCOPE: CPT | Performed by: FAMILY MEDICINE

## 2022-06-07 PROCEDURE — 99214 OFFICE O/P EST MOD 30 MIN: CPT | Mod: S$PBB,,, | Performed by: FAMILY MEDICINE

## 2022-06-07 PROCEDURE — 99999 PR PBB SHADOW E&M-EST. PATIENT-LVL IV: CPT | Mod: PBBFAC,,, | Performed by: FAMILY MEDICINE

## 2022-06-07 PROCEDURE — 99214 PR OFFICE/OUTPT VISIT, EST, LEVL IV, 30-39 MIN: ICD-10-PCS | Mod: S$PBB,,, | Performed by: FAMILY MEDICINE

## 2022-06-07 RX ORDER — NITROFURANTOIN 25; 75 MG/1; MG/1
100 CAPSULE ORAL 2 TIMES DAILY
Qty: 14 CAPSULE | Refills: 0 | Status: SHIPPED | OUTPATIENT
Start: 2022-06-07 | End: 2022-06-14

## 2022-06-07 NOTE — PROGRESS NOTES
[facility] - Clinic Note    Subjective      Ms. Gamble is a 32 y.o. female who presents to clinic with concerns of having a UTI.     Patient complains of urinary frequency for the past few days.   Associated with lower abdominal pain.   Denies fevers or nausea.     Takes trazodone for sleep. Stable.     Takes lexapro for depression and anxiety.     PM Dori has a past medical history of Anticoagulant long-term use, Anxiety, Depression, GERD (gastroesophageal reflux disease), Hypothyroidism, Migraine headache, and Stroke.   PSXH Dori has a past surgical history that includes Hysterectomy;  section; and Brain surgery.    Dori's family history includes Arthritis in her maternal grandmother; Depression in her mother and sister; Heart disease in her father and mother; Kidney disease in her sister; Stroke in her mother.    Dori reports that she quit smoking about 9 months ago. Her smoking use included cigarettes. She has a 8.50 pack-year smoking history. She has never used smokeless tobacco. She reports current alcohol use. She reports previous drug use.   DAI Meadows is allergic to latex, natural rubber and latex.   MAYANK Meadows has a current medication list which includes the following prescription(s): aspirin, atenolol, atorvastatin, clopidogrel, cyanocobalamin, duloxetine, escitalopram oxalate, levothyroxine, nurtec, omeprazole, trazodone, and nitrofurantoin (macrocrystal-monohydrate).     Review of Systems   Constitutional: Negative for activity change, appetite change, chills, fatigue and fever.   Eyes: Negative for visual disturbance.   Respiratory: Negative for cough and shortness of breath.    Cardiovascular: Negative for chest pain, palpitations and leg swelling.   Gastrointestinal: Positive for abdominal pain. Negative for nausea and vomiting.   Genitourinary: Positive for frequency and urgency. Negative for dysuria, vaginal bleeding, vaginal discharge and vaginal pain.   Skin:  "Negative for wound.   Neurological: Negative for dizziness and headaches.   Psychiatric/Behavioral: Negative for confusion.     Objective     /77 (BP Location: Left arm, Patient Position: Sitting, BP Method: Medium (Automatic))   Pulse 80   Resp 15   Ht 5' 3" (1.6 m)   Wt 88.1 kg (194 lb 4 oz)   SpO2 97%   BMI 34.41 kg/m²     Physical Exam   Constitutional: She appears well-developed, well-nourished and obese.  Non-toxic appearance. She does not appear ill. No distress.   HENT:   Head: Normocephalic and atraumatic.   Eyes: Right eye exhibits no discharge. Left eye exhibits no discharge.   Cardiovascular: Normal rate, regular rhythm, normal heart sounds and normal pulses. Exam reveals no gallop and no friction rub.   No murmur heard.  Pulmonary/Chest: Effort normal and breath sounds normal. No respiratory distress. She has no wheezes. She has no rhonchi. She has no rales.   Abdominal: Normal appearance.   Musculoskeletal:      Cervical back: Neck supple.   Lymphadenopathy:     She has no cervical adenopathy.   Neurological: She is alert.   Skin: Skin is warm and dry. Capillary refill takes less than 2 seconds. She is not diaphoretic.   Psychiatric: Her behavior is normal. Mood, judgment and thought content normal.   Vitals reviewed.     Assessment/Plan     Dori was seen today for urinary tract infection.    Diagnoses and all orders for this visit:    Urinary frequency  -     Urinalysis, Reflex to Urine Culture Urine, Clean Catch; Future  -     nitrofurantoin, macrocrystal-monohydrate, (MACROBID) 100 MG capsule; Take 1 capsule (100 mg total) by mouth 2 (two) times daily. for 7 days    Sleep disturbance    Severe episode of recurrent major depressive disorder, without psychotic features    Anxiety        Future Appointments   Date Time Provider Department Center   7/7/2022  8:00 AM Rc Jolly MD [facility] DANIEL Melo Osvaldo   9/14/2022  3:20 PM Feliciano Acosta MD [facility] "       Feliciano Acosta MD  Family Medicine  [facility]

## 2022-06-30 ENCOUNTER — PATIENT MESSAGE (OUTPATIENT)
Dept: FAMILY MEDICINE | Facility: CLINIC | Age: 32
End: 2022-06-30
Payer: OTHER MISCELLANEOUS

## 2022-06-30 DIAGNOSIS — Z86.73 HISTORY OF STROKE: ICD-10-CM

## 2022-06-30 DIAGNOSIS — G43.011 INTRACTABLE MIGRAINE WITHOUT AURA AND WITH STATUS MIGRAINOSUS: ICD-10-CM

## 2022-06-30 DIAGNOSIS — F33.2 SEVERE EPISODE OF RECURRENT MAJOR DEPRESSIVE DISORDER, WITHOUT PSYCHOTIC FEATURES: ICD-10-CM

## 2022-06-30 DIAGNOSIS — G47.9 SLEEP DISTURBANCE: ICD-10-CM

## 2022-06-30 DIAGNOSIS — E03.9 HYPOTHYROIDISM, UNSPECIFIED TYPE: ICD-10-CM

## 2022-06-30 DIAGNOSIS — F41.9 ANXIETY: ICD-10-CM

## 2022-06-30 RX ORDER — ESCITALOPRAM OXALATE 20 MG/1
20 TABLET ORAL DAILY
Qty: 30 TABLET | Refills: 11 | Status: CANCELLED | OUTPATIENT
Start: 2022-06-30 | End: 2023-06-30

## 2022-06-30 RX ORDER — LEVOTHYROXINE SODIUM 50 UG/1
50 TABLET ORAL
Qty: 30 TABLET | Refills: 11 | Status: CANCELLED | OUTPATIENT
Start: 2022-06-30 | End: 2023-06-30

## 2022-06-30 RX ORDER — ATENOLOL 50 MG/1
50 TABLET ORAL DAILY
Qty: 30 TABLET | Refills: 11 | Status: CANCELLED | OUTPATIENT
Start: 2022-06-30 | End: 2023-06-30

## 2022-06-30 RX ORDER — TRAZODONE HYDROCHLORIDE 100 MG/1
100 TABLET ORAL NIGHTLY
Qty: 30 TABLET | Refills: 11 | Status: CANCELLED | OUTPATIENT
Start: 2022-06-30 | End: 2023-06-30

## 2022-06-30 RX ORDER — OMEPRAZOLE 10 MG/1
10 CAPSULE, DELAYED RELEASE ORAL DAILY
Qty: 30 CAPSULE | Refills: 11 | Status: CANCELLED | OUTPATIENT
Start: 2022-06-30 | End: 2023-06-30

## 2022-06-30 RX ORDER — CLOPIDOGREL BISULFATE 75 MG/1
75 TABLET ORAL DAILY
Qty: 30 TABLET | Refills: 11 | Status: CANCELLED | OUTPATIENT
Start: 2022-06-30 | End: 2023-06-30

## 2022-06-30 RX ORDER — ATORVASTATIN CALCIUM 40 MG/1
40 TABLET, FILM COATED ORAL DAILY
Qty: 90 TABLET | Refills: 3 | Status: CANCELLED | OUTPATIENT
Start: 2022-06-30 | End: 2023-06-30

## 2022-07-07 ENCOUNTER — OFFICE VISIT (OUTPATIENT)
Dept: HEMATOLOGY/ONCOLOGY | Facility: CLINIC | Age: 32
End: 2022-07-07

## 2022-07-07 VITALS
SYSTOLIC BLOOD PRESSURE: 110 MMHG | TEMPERATURE: 98 F | HEART RATE: 68 BPM | OXYGEN SATURATION: 99 % | DIASTOLIC BLOOD PRESSURE: 67 MMHG | WEIGHT: 195.19 LBS | RESPIRATION RATE: 16 BRPM | BODY MASS INDEX: 35.92 KG/M2 | HEIGHT: 62 IN

## 2022-07-07 DIAGNOSIS — I63.411 EMBOLIC STROKE INVOLVING RIGHT MIDDLE CEREBRAL ARTERY: Primary | ICD-10-CM

## 2022-07-07 PROCEDURE — 99999 PR PBB SHADOW E&M-EST. PATIENT-LVL V: CPT | Mod: PBBFAC,,, | Performed by: INTERNAL MEDICINE

## 2022-07-07 PROCEDURE — 99999 PR PBB SHADOW E&M-EST. PATIENT-LVL V: ICD-10-PCS | Mod: PBBFAC,,, | Performed by: INTERNAL MEDICINE

## 2022-07-07 PROCEDURE — 99205 PR OFFICE/OUTPT VISIT, NEW, LEVL V, 60-74 MIN: ICD-10-PCS | Mod: S$PBB,,, | Performed by: INTERNAL MEDICINE

## 2022-07-07 PROCEDURE — 99205 OFFICE O/P NEW HI 60 MIN: CPT | Mod: S$PBB,,, | Performed by: INTERNAL MEDICINE

## 2022-07-07 PROCEDURE — 99215 OFFICE O/P EST HI 40 MIN: CPT | Mod: PBBFAC | Performed by: INTERNAL MEDICINE
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
## 2022-08-03 ENCOUNTER — HOSPITAL ENCOUNTER (OUTPATIENT)
Dept: RADIOLOGY | Facility: HOSPITAL | Age: 32
Discharge: HOME | End: 2022-08-03
Attending: FAMILY MEDICINE

## 2022-08-03 ENCOUNTER — OFFICE VISIT (OUTPATIENT)
Dept: FAMILY MEDICINE | Facility: CLINIC | Age: 32
End: 2022-08-03

## 2022-08-03 VITALS
HEART RATE: 62 BPM | WEIGHT: 187.38 LBS | RESPIRATION RATE: 16 BRPM | BODY MASS INDEX: 34.48 KG/M2 | HEIGHT: 62 IN | SYSTOLIC BLOOD PRESSURE: 110 MMHG | DIASTOLIC BLOOD PRESSURE: 78 MMHG

## 2022-08-03 DIAGNOSIS — E03.9 HYPOTHYROIDISM, UNSPECIFIED TYPE: ICD-10-CM

## 2022-08-03 DIAGNOSIS — R07.89 CHEST WALL PAIN: ICD-10-CM

## 2022-08-03 DIAGNOSIS — R07.89 CHEST WALL PAIN: Primary | ICD-10-CM

## 2022-08-03 PROCEDURE — 99214 OFFICE O/P EST MOD 30 MIN: CPT | Mod: S$PBB,,, | Performed by: FAMILY MEDICINE

## 2022-08-03 PROCEDURE — 99999 PR PBB SHADOW E&M-EST. PATIENT-LVL IV: CPT | Mod: PBBFAC,,, | Performed by: FAMILY MEDICINE

## 2022-08-03 PROCEDURE — 71046 X-RAY EXAM CHEST 2 VIEWS: CPT | Mod: 26,,, | Performed by: RADIOLOGY

## 2022-08-03 PROCEDURE — 71046 X-RAY EXAM CHEST 2 VIEWS: CPT | Mod: TC

## 2022-08-03 PROCEDURE — 99214 OFFICE O/P EST MOD 30 MIN: CPT | Mod: PBBFAC,25 | Performed by: FAMILY MEDICINE

## 2022-08-03 PROCEDURE — 99999 PR PBB SHADOW E&M-EST. PATIENT-LVL IV: ICD-10-PCS | Mod: PBBFAC,,, | Performed by: FAMILY MEDICINE

## 2022-08-03 PROCEDURE — 71046 XR CHEST PA AND LATERAL: ICD-10-PCS | Mod: 26,,, | Performed by: RADIOLOGY

## 2022-08-03 PROCEDURE — 99214 PR OFFICE/OUTPT VISIT, EST, LEVL IV, 30-39 MIN: ICD-10-PCS | Mod: S$PBB,,, | Performed by: FAMILY MEDICINE

## 2022-08-03 RX ORDER — METHYLPREDNISOLONE 4 MG/1
TABLET ORAL
Qty: 1 EACH | Refills: 0 | Status: SHIPPED | OUTPATIENT
Start: 2022-08-03 | End: 2022-08-24

## 2022-08-03 RX ORDER — IBUPROFEN 800 MG/1
800 TABLET ORAL EVERY 6 HOURS PRN
COMMUNITY
Start: 2022-07-28 | End: 2022-10-05

## 2022-08-03 RX ORDER — AMOXICILLIN 250 MG/1
500 CAPSULE ORAL EVERY 8 HOURS
COMMUNITY
Start: 2022-07-28 | End: 2022-09-02

## 2022-08-03 RX ORDER — HYDROCODONE BITARTRATE AND ACETAMINOPHEN 10; 325 MG/1; MG/1
1 TABLET ORAL EVERY 6 HOURS PRN
COMMUNITY
Start: 2022-07-28 | End: 2022-09-02

## 2022-08-03 NOTE — PROGRESS NOTES
[facility] - Clinic Note    Subjective      Ms. Gamble is a 32 y.o. female who presents to clinic with complains of pain.     Patient reports that it hurts when she takes a breath in.   Reports tenderness and pain on both sides of the chest wall  Started 2 days ago.   Feels nauseous. Threw up yesterday.   Admits to chills.   Denies fevers. No know sick contacts.   Denies urinary symptoms.   No trauma.   Had a tooth extraction a few days ago.    Due for TSH check. Patient in on levothyroxine.    [facility] Dori has a past medical history of Anticoagulant long-term use, Anxiety, Depression, GERD (gastroesophageal reflux disease), Hypothyroidism, Migraine headache, and Stroke.   PSXH Dori has a past surgical history that includes Hysterectomy;  section; and Brain surgery.    Dori's family history includes Arthritis in her maternal grandmother; Depression in her mother and sister; Heart disease in her father and mother; Kidney disease in her sister; Stroke in her mother.    Dori reports that she quit smoking about 11 months ago. Her smoking use included cigarettes. She has a 8.50 pack-year smoking history. She has never used smokeless tobacco. She reports current alcohol use. She reports previous drug use.   DAI Meadows is allergic to latex, natural rubber and latex.   MAYANK Meadows has a current medication list which includes the following prescription(s): amoxicillin, aspirin, atenolol, atorvastatin, clopidogrel, hydrocodone-acetaminophen, ibuprofen, levothyroxine, trazodone, cyanocobalamin, duloxetine, escitalopram oxalate, nurtec, and omeprazole.     Review of Systems   Constitutional: Negative for activity change, appetite change, chills, fatigue and fever.   HENT: Negative for congestion, postnasal drip, rhinorrhea, sinus pressure, sinus pain, sneezing and sore throat.    Eyes: Negative for visual disturbance.   Respiratory: Negative for cough and shortness of breath.         Chest wall  "pain   Cardiovascular: Negative for chest pain, palpitations and leg swelling.   Gastrointestinal: Negative for abdominal pain, diarrhea, nausea and vomiting.   Genitourinary: Negative for decreased urine volume, difficulty urinating, dysuria, frequency, urgency, vaginal bleeding, vaginal discharge and vaginal pain.   Skin: Negative for wound.   Neurological: Negative for dizziness and headaches.   Psychiatric/Behavioral: Negative for confusion.     Objective     /78 (BP Location: Left arm, Patient Position: Sitting, BP Method: Medium (Manual))   Pulse 62   Resp 16   Ht 5' 2" (1.575 m)   Wt 85 kg (187 lb 6 oz)   LMP  (LMP Unknown)   BMI 34.27 kg/m²     Physical Exam   Constitutional: She appears well-developed, well-nourished and obese.  Non-toxic appearance. She does not appear ill. No distress.   HENT:   Head: Normocephalic and atraumatic.   Eyes: Right eye exhibits no discharge. Left eye exhibits no discharge.   Cardiovascular: Normal rate, regular rhythm, normal heart sounds and normal pulses. Exam reveals no gallop and no friction rub.   No murmur heard.  Pulmonary/Chest: Effort normal and breath sounds normal. No respiratory distress. She has no wheezes. She has no rhonchi. She has no rales. She exhibits tenderness.   Abdominal: Normal appearance.   Musculoskeletal:      Cervical back: Neck supple.   Lymphadenopathy:     She has no cervical adenopathy.   Neurological: She is alert.   Skin: Skin is warm and dry. Capillary refill takes less than 2 seconds. She is not diaphoretic.   Psychiatric: Her behavior is normal. Mood, judgment and thought content normal.   Vitals reviewed.     Assessment/Plan     Dori was seen today for follow-up.    Diagnoses and all orders for this visit:    Chest wall pain  -     X-Ray Chest PA And Lateral; Future    Hypothyroidism, unspecified type  -     TSH; Future      -consider pleurisy. Obtain CXR if normal then will treat with steroids.    Future Appointments "   Date Time Provider Department Center   9/2/2022  8:00 AM Pradip Restrepo MD [facility] NEURO Leon y   9/14/2022  3:20 PM Feliciano Acosta MD [facility]       Feliciano Acosta MD  Family Medicine [facility]

## 2022-08-04 ENCOUNTER — PATIENT MESSAGE (OUTPATIENT)
Dept: FAMILY MEDICINE | Facility: CLINIC | Age: 32
End: 2022-08-04
Payer: OTHER MISCELLANEOUS

## 2022-08-04 ENCOUNTER — TELEPHONE (OUTPATIENT)
Dept: FAMILY MEDICINE | Facility: CLINIC | Age: 32
End: 2022-08-04
Payer: OTHER MISCELLANEOUS

## 2022-08-04 NOTE — TELEPHONE ENCOUNTER
----- Message from Feliciano Acosta MD sent at 8/3/2022  4:31 PM CDT -----  Chest xray is normal. I have sent in a steroid pack as we discussed at your appointment today

## 2022-08-10 DIAGNOSIS — E03.9 HYPOTHYROIDISM, UNSPECIFIED TYPE: Primary | ICD-10-CM

## 2022-08-10 RX ORDER — LEVOTHYROXINE SODIUM 75 UG/1
75 TABLET ORAL
Qty: 30 TABLET | Refills: 11 | Status: SHIPPED | OUTPATIENT
Start: 2022-08-10 | End: 2023-08-10

## 2022-08-15 ENCOUNTER — TELEPHONE (OUTPATIENT)
Dept: FAMILY MEDICINE | Facility: CLINIC | Age: 32
End: 2022-08-15
Payer: OTHER MISCELLANEOUS

## 2022-08-15 NOTE — TELEPHONE ENCOUNTER
----- Message from Feliciano Acosta MD sent at 8/10/2022  1:46 PM CDT -----  Thyroid hormone is slightly elevated. I have increase your levothyroxine to 75mcg. Will TSH recheck in 6-8 weeks

## 2022-09-02 ENCOUNTER — PATIENT MESSAGE (OUTPATIENT)
Dept: CARDIOLOGY | Facility: CLINIC | Age: 32
End: 2022-09-02
Payer: OTHER MISCELLANEOUS

## 2022-09-02 ENCOUNTER — OFFICE VISIT (OUTPATIENT)
Dept: NEUROLOGY | Facility: CLINIC | Age: 32
End: 2022-09-02

## 2022-09-02 ENCOUNTER — ANTI-COAG VISIT (OUTPATIENT)
Dept: CARDIOLOGY | Facility: CLINIC | Age: 32
End: 2022-09-02
Payer: OTHER MISCELLANEOUS

## 2022-09-02 ENCOUNTER — PATIENT MESSAGE (OUTPATIENT)
Dept: NEUROLOGY | Facility: CLINIC | Age: 32
End: 2022-09-02
Payer: OTHER MISCELLANEOUS

## 2022-09-02 VITALS
WEIGHT: 185 LBS | HEIGHT: 63 IN | BODY MASS INDEX: 32.78 KG/M2 | HEART RATE: 55 BPM | SYSTOLIC BLOOD PRESSURE: 102 MMHG | DIASTOLIC BLOOD PRESSURE: 72 MMHG

## 2022-09-02 DIAGNOSIS — I63.411 EMBOLIC STROKE INVOLVING RIGHT MIDDLE CEREBRAL ARTERY: ICD-10-CM

## 2022-09-02 DIAGNOSIS — I63.411 EMBOLIC STROKE INVOLVING RIGHT MIDDLE CEREBRAL ARTERY: Primary | ICD-10-CM

## 2022-09-02 DIAGNOSIS — Z79.01 LONG TERM (CURRENT) USE OF ANTICOAGULANTS: Primary | ICD-10-CM

## 2022-09-02 DIAGNOSIS — G62.9 PERIPHERAL POLYNEUROPATHY: ICD-10-CM

## 2022-09-02 PROCEDURE — 99999 PR PBB SHADOW E&M-EST. PATIENT-LVL IV: ICD-10-PCS | Mod: PBBFAC,,, | Performed by: STUDENT IN AN ORGANIZED HEALTH CARE EDUCATION/TRAINING PROGRAM

## 2022-09-02 PROCEDURE — 99999 PR PBB SHADOW E&M-EST. PATIENT-LVL IV: CPT | Mod: PBBFAC,,, | Performed by: STUDENT IN AN ORGANIZED HEALTH CARE EDUCATION/TRAINING PROGRAM

## 2022-09-02 PROCEDURE — 99214 OFFICE O/P EST MOD 30 MIN: CPT | Mod: PBBFAC | Performed by: STUDENT IN AN ORGANIZED HEALTH CARE EDUCATION/TRAINING PROGRAM

## 2022-09-02 PROCEDURE — 99214 PR OFFICE/OUTPT VISIT, EST, LEVL IV, 30-39 MIN: ICD-10-PCS | Mod: S$PBB,,, | Performed by: STUDENT IN AN ORGANIZED HEALTH CARE EDUCATION/TRAINING PROGRAM

## 2022-09-02 PROCEDURE — 99214 OFFICE O/P EST MOD 30 MIN: CPT | Mod: S$PBB,,, | Performed by: STUDENT IN AN ORGANIZED HEALTH CARE EDUCATION/TRAINING PROGRAM

## 2022-09-02 RX ORDER — WARFARIN SODIUM 5 MG/1
5 TABLET ORAL DAILY
Qty: 30 TABLET | Refills: 11 | Status: SHIPPED | OUTPATIENT
Start: 2022-09-02 | End: 2022-11-03 | Stop reason: SDUPTHER

## 2022-09-02 RX ORDER — DULOXETIN HYDROCHLORIDE 20 MG/1
20 CAPSULE, DELAYED RELEASE ORAL DAILY
Qty: 30 CAPSULE | Refills: 11 | Status: SHIPPED | OUTPATIENT
Start: 2022-09-02 | End: 2022-09-02

## 2022-09-02 RX ORDER — GABAPENTIN 300 MG/1
600 CAPSULE ORAL 3 TIMES DAILY
Qty: 180 CAPSULE | Refills: 11 | Status: SHIPPED | OUTPATIENT
Start: 2022-09-02 | End: 2022-10-05 | Stop reason: ALTCHOICE

## 2022-09-02 NOTE — PROGRESS NOTES
31 y/o on OAC for h/o embolic CVA. Her other PMH includes mitral and tricuspid valve disorders, MS, seizures, HA, HLD, hypothyroidism.

## 2022-09-02 NOTE — PROGRESS NOTES
[facility] - NEUROLOGY 7TH [place] [facility], [place]    Date: 9/2/22  Patient Name: Dori Gamble   MRN: [id number]   PCP: Feliciano Acosta  Referring Provider: No ref. provider found    Assessment:   Dori Gamble is a 32 y.o. female presenting to clinic as follow up. See HPI and interval history for full detail. The patients current syndrome is consistent with MRI brain findings which account for her gait instability and mild dysmetria as well as her mild hypoanesthesia on the L compared to her right. The patients memory is likely impaired secondary to her disease burden intracranially likely compounded by stress, depression and anxiety. Will need to clinically monitor to determine if any improvement occurs over time or if further investigation is warranted. With regard to the patients stroke, the patient had recently seen hematology who deferred use of AC to neurology given lack of conclusive evidence for APS. The patients strokes are unclear with regard to etiology. Could be secondary to a possible hypercoaguable state v embolic etiology. Patient would, nonetheless, require AC for adequate stroke prevention given her stroke recurrence on DAPT alone and most likely stroke etiologies. Recommended starting patient on warfarin with follow up in warfarin clinic; as well as further workup with repeat RADHA (given lack of records of prior study). Patient may need further workup with Loop recorder in future pending RADHA result. Additionally, with regard to the patients possible peripheral neuropathy - the patient may benefit from treatment with medication. She had tried gabapentin in the past with some relief but with possible side effect. The patient may benefit from another trial of gabapentin 600 TID. Can consider an alternative medication in the future as needed based upon side effect and efficacy of the medication for the patients symptoms. Should the patients neuropathic pain  persist or worsen, could consider another EMG/NCS (given lack of records from OSH). Serum workup thus far has been unrevealing.     Plan:     Reviewed hematology note  Will start patient on Coumadin with close follow up in Coumadin clinic; referral placed   and patient were reminded of the importance of follow up with Coumadin clinic and were told to call neurology office should they not have a scheduled visit by next week   Continue statin  Started on gabapentin 600 TID  Patient to message or call with any side effects to medications; can consider changing to cymbalta if needed   Repeat RADHA ordered, will review  Can consider repeat EMG/NCS in future as needed  Follow up in 6 months  Letter placed in chart for patient to send to insurance/disability  Patient sent information via chart for affordable medication plan option  Patient to call or message with any other questions or concerns  She and  voiced understanding of and agreement with this plan as outlined    Problem List Items Addressed This Visit          Neuro    Embolic stroke involving right middle cerebral artery - Primary    Relevant Orders    Ambulatory referral/consult to Physical/Occupational Therapy    Ambulatory referral/consult to Social Work    Ambulatory referral/consult to Anticoagulation Monitoring    Transesophageal echo (RADHA)     Other Visit Diagnoses       Peripheral polyneuropathy                Pradip Restrepo MD    Patient note was created using MModal Dictation.  Any errors in syntax or even information may not have been identified and edited on initial review prior to signing this note.  Subjective:   Patient seen in consultation at the request of No ref. provider found for the evaluation of stroke followup. A copy of this note will be sent to the referring physician.     Interval History 9/2/22:  Patient presented to clinic for follow up. The patient had previously been seen folllowing stroke of undetermined etiology (ESUS  "v thrombotic state). The patient reports that since the last clinic visit, she has had frequent falls. Patient states that she is feeling "off balance". Has not had any associated head trauma.Patient reports that it is worse at night. Patient feels as though she is missing parts of her day. States that she will "forget an hour, or something"  When prompted about the activity that she can not remember the events recalled to her. Still having continued bilateral lower extremity parasthesias. She states that it feels like sandpaper and that her "legs feel heavy".  states that the patient will have frequent slurred speech and being off balance  Patient states that mornings are typically better symptomatically then later in the day. No visual complaints. Endorsed bilateral lower extremity weakness and dropping objects.Since her last visit, the patient had seen hematology who deferred AC decision to neurology given a lack of evidence to support a diagnosis of APS. The patient reports that she still is without insurance. She has been compliant with DAPT but otherwise has never been on AC for a prolonged period of time and has not yet gotten a follow up RADHA (had been previously done at OSH). She also notes that she has been unable to obtain her EMG/NCS from OSH as well.        HPI:   Ms. Dori Gamble is a 32 y.o. female with prior L PCA stroke, repeated symptoms of CVA (starting at age 17) complicated by possible stress-related events, hypothyroidism, migraines and TBI who is presenting to clinic for follow up after a recent hospitalization. The patient had recently been discharged in 4/2022 after being treated for an acute stroke. At that time, the patient had acute onset symptoms of R MCA syndrome for which she could not get tPA (on AC at the time) but had thrombectomy with TICI 3 reperfusion of a R M2/3 thrombus. Subsequent MRI noted R MCA infarct with microhemmorhage in the right posterior temporal lobe. " Further workup while inpatient included a TTE (no significant findings), and TCD (no shunt identified). Patient reportedly had a RADHA at OSH, however records not currently available. No further abnormalities were noted during angio at time of thrombectomy. Eliquis was discontinued during hospitalization as it was unclear what indication the patient had for the medication (had a previously abnormal APA of questionable significance). She was discharged on DAPT and Statin and home health. NIH was 3 at time of discharge due to facial droop, dysarthria and a partial hemianopia. The patient reports having had difficulty with walking since discharge, but notes that it had remained unchanged since TBI in fall of 2021. Additionally she reported some b/l LE parasthesias and bilateral sciatic pain (R>L). She states that it feels as though her legs are heavy and numb and that they will oftentimes have feelings of pins and needles. She reports this pain to be intermittent in nature but states that it is most often present to varying degrees of severity. She additionally notes that it is exacerbated by prolonged activity on her feet but notes that she does still have the abnormal sensation and pain at rest as well. NCS/EMG completed at OSH, however records not currently available. She and  both stated that the patient had also had difficulties with memory since her TBI but note that it was further exacerbated since her stroke. Finally, they also state that the patient has been increasingly emotional since discharge from the hospital; Patient was tearful in the exam room as well.     NIH in clinic remained 3, stable since discharge. Several labs, ordered during hospitalization, had resulted. As part of her previously ordered hypercoaguable workup, the patients APA had returned abnormal once again, but with lower titers. The patient is currently uninsured and therefore is hesitant to try too much which would cost very much  prior to obtaining insurance.      PAST MEDICAL HISTORY:  Past Medical History:   Diagnosis Date    Anticoagulant long-term use     Anxiety     Depression     GERD (gastroesophageal reflux disease)     Hypothyroidism     Migraine headache     Stroke        PAST SURGICAL HISTORY:  Past Surgical History:   Procedure Laterality Date    BRAIN SURGERY       SECTION      HYSTERECTOMY         CURRENT MEDS:  Current Outpatient Medications   Medication Sig Dispense Refill    aspirin 81 MG Chew Take 1 tablet (81 mg total) by mouth once daily. 30 tablet 0    atenoloL (TENORMIN) 50 MG tablet Take 1 tablet (50 mg total) by mouth once daily. 30 tablet 11    atorvastatin (LIPITOR) 40 MG tablet Take 1 tablet (40 mg total) by mouth once daily. 90 tablet 3    EScitalopram oxalate (LEXAPRO) 20 MG tablet Take 1 tablet (20 mg total) by mouth once daily. 30 tablet 11    levothyroxine (SYNTHROID) 75 MCG tablet Take 1 tablet (75 mcg total) by mouth before breakfast. 30 tablet 11    omeprazole (PRILOSEC) 10 MG capsule Take 1 capsule (10 mg total) by mouth once daily. 30 capsule 11    traZODone (DESYREL) 100 MG tablet Take 1 tablet (100 mg total) by mouth every evening. 30 tablet 11    gabapentin (NEURONTIN) 300 MG capsule Take 2 capsules (600 mg total) by mouth 3 (three) times daily. 180 capsule 11    ibuprofen (ADVIL,MOTRIN) 800 MG tablet Take 800 mg by mouth every 6 (six) hours as needed.      NURTEC 75 mg odt Take 1 tablet by mouth.      warfarin (COUMADIN) 5 MG tablet Take 1 tablet (5 mg total) by mouth Daily. 30 tablet 11     No current facility-administered medications for this visit.       ALLERGIES:  Review of patient's allergies indicates:   Allergen Reactions    Latex, natural rubber Rash    Latex Hives and Rash       FAMILY HISTORY:  Family History   Problem Relation Age of Onset    Depression Mother     Heart disease Mother     Stroke Mother     Heart disease Father     Kidney disease Sister     Depression Sister      "Arthritis Maternal Grandmother        SOCIAL HISTORY:  Social History     Tobacco Use    Smoking status: Former     Packs/day: 0.50     Years: 17.00     Pack years: 8.50     Types: Cigarettes     Quit date: 2021     Years since quittin.0    Smokeless tobacco: Never   Substance Use Topics    Alcohol use: Yes     Comment: occ.     Drug use: Not Currently       Review of Systems:  12 system review of systems is negative except for the symptoms mentioned in HPI.      Objective:     Vitals:    22 0757   BP: 102/72   BP Location: Left arm   Patient Position: Sitting   BP Method: Large (Automatic)   Pulse: (!) 55   Weight: 83.9 kg (185 lb)   Height: 5' 2.5" (1.588 m)     General: NAD, well nourished   Eyes: no tearing, discharge, no erythema   ENT: moist mucous membranes of the oral cavity, nares patent    Neck: Supple, full range of motion  Cardiovascular: Warm and well perfused, pulses equal and symmetrical  Lungs: Normal work of breathing, normal chest wall excursions  Skin: No rash, lesions, or breakdown on exposed skin  Psychiatry: Increased mood lability and crying.   Abdomen: soft, non tender, non distended  Extremeties: No cyanosis, clubbing or edema.  +Imbalance with falls  +Memory difficulties    Neurological   MENTAL STATUS: Alert and oriented to person, place, and time. Attention and concentration within normal limits. Speech without dysarthria, able to name and repeat without difficulty. Recent and remote memory within normal limits   CRANIAL NERVES: Nearly complete R hemianopia. PERRL. EOMI. Facial sensation intact. Mild L facial droop. Hearing grossly intact. Full shoulder shrug bilaterally. Tongue protrudes midline   SENSORY: Sensation is diminished to light touch and temperature in LUE and LLE compared to right.  Joint position perception intact.   MOTOR: Normal bulk and tone. No pronator drift.  5/5 deltoid, biceps, triceps, interosseous, hand  bilaterally. 5/5 iliopsoas, knee " extension/flexion, foot dorsi/plantarflexion bilaterally.    REFLEXES: Symmetric and 2+ throughout. Toes down going bilaterally.   CEREBELLAR/COORDINATION/GAIT: Gait slowed and cautious with normal arm swing and decreased stride length. Finger to nose with mild dysmetria. Normal rapid alternating movements.

## 2022-09-02 NOTE — PROGRESS NOTES
Spoke to patient regarding enrollment into the Coumadin Clinic. Patient verified that she has a 5mg strength warfarin, and was advised on a medication regimen of 5mg (1 tablet) of warfarin qPM.  INR scheduled for 9/6/22, at [facility], confirmed with patient.    Patient educated on diet and when to call. All questions and concerns addressed at this time, and patient expressed understanding. Education sheets sent via patient portal to reinforce teaching.    Patient will eat high and/or moderate vitamin K foods 2x/week, and drink  48oz of beer 2x/week.  The risks associated with consuming Etoh, while taking warfarin, and the importance of a consistent diet discussed.

## 2022-09-06 ENCOUNTER — ANTI-COAG VISIT (OUTPATIENT)
Dept: CARDIOLOGY | Facility: CLINIC | Age: 32
End: 2022-09-06
Payer: OTHER MISCELLANEOUS

## 2022-09-06 ENCOUNTER — LAB VISIT (OUTPATIENT)
Dept: LAB | Facility: HOSPITAL | Age: 32
End: 2022-09-06
Attending: STUDENT IN AN ORGANIZED HEALTH CARE EDUCATION/TRAINING PROGRAM

## 2022-09-06 DIAGNOSIS — I63.411 EMBOLIC STROKE INVOLVING RIGHT MIDDLE CEREBRAL ARTERY: ICD-10-CM

## 2022-09-06 DIAGNOSIS — I63.411 EMBOLIC STROKE INVOLVING RIGHT MIDDLE CEREBRAL ARTERY: Primary | ICD-10-CM

## 2022-09-06 DIAGNOSIS — Z79.01 LONG TERM (CURRENT) USE OF ANTICOAGULANTS: ICD-10-CM

## 2022-09-06 LAB
INR PPP: 2.1 (ref 0.8–1.2)
PROTHROMBIN TIME: 21.1 SEC (ref 9–12.5)

## 2022-09-06 PROCEDURE — 85610 PROTHROMBIN TIME: CPT | Performed by: PSYCHIATRY & NEUROLOGY

## 2022-09-06 PROCEDURE — 36415 COLL VENOUS BLD VENIPUNCTURE: CPT | Performed by: PSYCHIATRY & NEUROLOGY

## 2022-09-06 PROCEDURE — 93793 ANTICOAG MGMT PT WARFARIN: CPT | Mod: S$GLB,,,

## 2022-09-06 PROCEDURE — 93793 PR ANTICOAGULANT MGMT FOR PT TAKING WARFARIN: ICD-10-PCS | Mod: S$GLB,,,

## 2022-09-06 NOTE — PROGRESS NOTES
INR at goal, but patient is a new start to warfarin and has only been taking for 4 days. Hesitant that INR is rising too quickly, especially since patient's INR goal is more narrow at 2-2.5. Will give a reduced dose the next two days. Please see calendar for full dosing details.

## 2022-09-08 ENCOUNTER — PATIENT MESSAGE (OUTPATIENT)
Dept: FAMILY MEDICINE | Facility: CLINIC | Age: 32
End: 2022-09-08
Payer: OTHER MISCELLANEOUS

## 2022-09-14 ENCOUNTER — LAB VISIT (OUTPATIENT)
Dept: LAB | Facility: HOSPITAL | Age: 32
End: 2022-09-14
Attending: PSYCHIATRY & NEUROLOGY

## 2022-09-14 DIAGNOSIS — Z79.01 LONG TERM (CURRENT) USE OF ANTICOAGULANTS: ICD-10-CM

## 2022-09-14 DIAGNOSIS — I63.411 EMBOLIC STROKE INVOLVING RIGHT MIDDLE CEREBRAL ARTERY: ICD-10-CM

## 2022-09-14 LAB
INR PPP: 2.3 (ref 0.8–1.2)
PROTHROMBIN TIME: 23 SEC (ref 9–12.5)

## 2022-09-14 PROCEDURE — 36415 COLL VENOUS BLD VENIPUNCTURE: CPT | Performed by: PSYCHIATRY & NEUROLOGY

## 2022-09-14 PROCEDURE — 85610 PROTHROMBIN TIME: CPT | Performed by: PSYCHIATRY & NEUROLOGY

## 2022-09-15 ENCOUNTER — ANTI-COAG VISIT (OUTPATIENT)
Dept: CARDIOLOGY | Facility: CLINIC | Age: 32
End: 2022-09-15
Payer: OTHER MISCELLANEOUS

## 2022-09-15 DIAGNOSIS — I63.411 EMBOLIC STROKE INVOLVING RIGHT MIDDLE CEREBRAL ARTERY: Primary | ICD-10-CM

## 2022-09-15 DIAGNOSIS — Z79.01 LONG TERM (CURRENT) USE OF ANTICOAGULANTS: ICD-10-CM

## 2022-09-15 PROCEDURE — 93793 ANTICOAG MGMT PT WARFARIN: CPT | Mod: S$GLB,,,

## 2022-09-15 PROCEDURE — 93793 PR ANTICOAGULANT MGMT FOR PT TAKING WARFARIN: ICD-10-PCS | Mod: S$GLB,,,

## 2022-09-16 NOTE — PROGRESS NOTES
Message sent to confirm INR goal with enrolling MD since not a standard goal. Please make pt knows to continue dose on her lab days    Update 9/23: INR goal changed to 2.0-3.0 per Dr. Restrepo. Chart updated.     Noted pt missed lab 9/21. She has labs planned 9/26, so linked in INR for Monday. Please remind pt INR needed asap Monday and to call [facility] if ever needing to r/s INRs asap. Make sure she has our number and knows how to reach us.

## 2022-09-19 ENCOUNTER — PATIENT MESSAGE (OUTPATIENT)
Dept: CARDIOLOGY | Facility: CLINIC | Age: 32
End: 2022-09-19
Payer: OTHER MISCELLANEOUS

## 2022-09-19 ENCOUNTER — PATIENT MESSAGE (OUTPATIENT)
Dept: NEUROLOGY | Facility: CLINIC | Age: 32
End: 2022-09-19
Payer: OTHER MISCELLANEOUS

## 2022-09-26 ENCOUNTER — ANTI-COAG VISIT (OUTPATIENT)
Dept: CARDIOLOGY | Facility: CLINIC | Age: 32
End: 2022-09-26
Payer: OTHER MISCELLANEOUS

## 2022-09-26 ENCOUNTER — LAB VISIT (OUTPATIENT)
Dept: LAB | Facility: HOSPITAL | Age: 32
End: 2022-09-26
Attending: FAMILY MEDICINE

## 2022-09-26 DIAGNOSIS — I63.411 EMBOLIC STROKE INVOLVING RIGHT MIDDLE CEREBRAL ARTERY: ICD-10-CM

## 2022-09-26 DIAGNOSIS — Z79.01 LONG TERM (CURRENT) USE OF ANTICOAGULANTS: ICD-10-CM

## 2022-09-26 DIAGNOSIS — I63.411 EMBOLIC STROKE INVOLVING RIGHT MIDDLE CEREBRAL ARTERY: Primary | ICD-10-CM

## 2022-09-26 DIAGNOSIS — E03.9 HYPOTHYROIDISM, UNSPECIFIED TYPE: ICD-10-CM

## 2022-09-26 LAB
INR PPP: 1.8 (ref 0.8–1.2)
PROTHROMBIN TIME: 18.2 SEC (ref 9–12.5)
TSH SERPL DL<=0.005 MIU/L-ACNC: 1.59 UIU/ML (ref 0.4–4)

## 2022-09-26 PROCEDURE — 85610 PROTHROMBIN TIME: CPT | Performed by: PSYCHIATRY & NEUROLOGY

## 2022-09-26 PROCEDURE — 36415 COLL VENOUS BLD VENIPUNCTURE: CPT | Performed by: PSYCHIATRY & NEUROLOGY

## 2022-09-26 PROCEDURE — 93793 PR ANTICOAGULANT MGMT FOR PT TAKING WARFARIN: ICD-10-PCS | Mod: S$GLB,,,

## 2022-09-26 PROCEDURE — 93793 ANTICOAG MGMT PT WARFARIN: CPT | Mod: S$GLB,,,

## 2022-09-26 PROCEDURE — 84443 ASSAY THYROID STIM HORMONE: CPT | Performed by: FAMILY MEDICINE

## 2022-09-27 ENCOUNTER — TELEPHONE (OUTPATIENT)
Dept: FAMILY MEDICINE | Facility: CLINIC | Age: 32
End: 2022-09-27
Payer: OTHER MISCELLANEOUS

## 2022-09-27 NOTE — TELEPHONE ENCOUNTER
----- Message from Feliciano Acosta MD sent at 9/27/2022  9:07 AM CDT -----  Thyroid hormone is within normal limits.

## 2022-10-04 ENCOUNTER — LAB VISIT (OUTPATIENT)
Dept: LAB | Facility: HOSPITAL | Age: 32
End: 2022-10-04
Attending: PSYCHIATRY & NEUROLOGY

## 2022-10-04 ENCOUNTER — ANTI-COAG VISIT (OUTPATIENT)
Dept: CARDIOLOGY | Facility: CLINIC | Age: 32
End: 2022-10-04
Payer: OTHER MISCELLANEOUS

## 2022-10-04 DIAGNOSIS — Z79.01 LONG TERM (CURRENT) USE OF ANTICOAGULANTS: ICD-10-CM

## 2022-10-04 DIAGNOSIS — I63.411 EMBOLIC STROKE INVOLVING RIGHT MIDDLE CEREBRAL ARTERY: ICD-10-CM

## 2022-10-04 DIAGNOSIS — I63.411 EMBOLIC STROKE INVOLVING RIGHT MIDDLE CEREBRAL ARTERY: Primary | ICD-10-CM

## 2022-10-04 LAB
INR PPP: 2 (ref 0.8–1.2)
PROTHROMBIN TIME: 19.8 SEC (ref 9–12.5)

## 2022-10-04 PROCEDURE — 36415 COLL VENOUS BLD VENIPUNCTURE: CPT | Performed by: PSYCHIATRY & NEUROLOGY

## 2022-10-04 PROCEDURE — 85610 PROTHROMBIN TIME: CPT | Performed by: PSYCHIATRY & NEUROLOGY

## 2022-10-04 PROCEDURE — 93793 PR ANTICOAGULANT MGMT FOR PT TAKING WARFARIN: ICD-10-PCS | Mod: S$GLB,,,

## 2022-10-04 PROCEDURE — 93793 ANTICOAG MGMT PT WARFARIN: CPT | Mod: S$GLB,,,

## 2022-10-05 ENCOUNTER — OFFICE VISIT (OUTPATIENT)
Dept: FAMILY MEDICINE | Facility: CLINIC | Age: 32
End: 2022-10-05

## 2022-10-05 VITALS
DIASTOLIC BLOOD PRESSURE: 78 MMHG | HEIGHT: 63 IN | WEIGHT: 183 LBS | OXYGEN SATURATION: 97 % | RESPIRATION RATE: 16 BRPM | BODY MASS INDEX: 32.43 KG/M2 | SYSTOLIC BLOOD PRESSURE: 118 MMHG | HEART RATE: 67 BPM

## 2022-10-05 DIAGNOSIS — E03.9 ACQUIRED HYPOTHYROIDISM: ICD-10-CM

## 2022-10-05 DIAGNOSIS — G43.011 INTRACTABLE MIGRAINE WITHOUT AURA AND WITH STATUS MIGRAINOSUS: ICD-10-CM

## 2022-10-05 DIAGNOSIS — G47.9 SLEEP DISTURBANCE: ICD-10-CM

## 2022-10-05 DIAGNOSIS — F41.9 ANXIETY: ICD-10-CM

## 2022-10-05 DIAGNOSIS — F33.2 SEVERE EPISODE OF RECURRENT MAJOR DEPRESSIVE DISORDER, WITHOUT PSYCHOTIC FEATURES: ICD-10-CM

## 2022-10-05 DIAGNOSIS — G62.9 PERIPHERAL POLYNEUROPATHY: Primary | ICD-10-CM

## 2022-10-05 PROCEDURE — 99214 OFFICE O/P EST MOD 30 MIN: CPT | Mod: PBBFAC | Performed by: FAMILY MEDICINE

## 2022-10-05 PROCEDURE — 99999 PR PBB SHADOW E&M-EST. PATIENT-LVL IV: CPT | Mod: PBBFAC,,, | Performed by: FAMILY MEDICINE

## 2022-10-05 PROCEDURE — 99214 PR OFFICE/OUTPT VISIT, EST, LEVL IV, 30-39 MIN: ICD-10-PCS | Mod: S$PBB,,, | Performed by: FAMILY MEDICINE

## 2022-10-05 PROCEDURE — 99214 OFFICE O/P EST MOD 30 MIN: CPT | Mod: S$PBB,,, | Performed by: FAMILY MEDICINE

## 2022-10-05 PROCEDURE — 99999 PR PBB SHADOW E&M-EST. PATIENT-LVL IV: ICD-10-PCS | Mod: PBBFAC,,, | Performed by: FAMILY MEDICINE

## 2022-10-05 RX ORDER — TRAZODONE HYDROCHLORIDE 100 MG/1
200 TABLET ORAL NIGHTLY
Qty: 60 TABLET | Refills: 11 | Status: SHIPPED | OUTPATIENT
Start: 2022-10-05 | End: 2023-10-05

## 2022-10-05 RX ORDER — PREGABALIN 150 MG/1
150 CAPSULE ORAL 3 TIMES DAILY
Qty: 90 CAPSULE | Refills: 5 | Status: SHIPPED | OUTPATIENT
Start: 2022-10-05 | End: 2022-11-30 | Stop reason: DRUGHIGH

## 2022-10-05 NOTE — PROGRESS NOTES
[facility] - Clinic Note    Subjective      Ms. Gamble is a 32 y.o. female who presents to clinic for a follow up.     Takes trazodone for sleep. Having to take 200mg nightly.      Takes lexapro for depression and anxiety.     Migraines: takes atenolol    Takes gabapentin for peripheral neuropathy. No relief for this.     Hypothyroidism: on levothyroxine 75mcg. Last TSH WNL.     H/o of cva: takes lipitor             PMH Dori has a past medical history of Anticoagulant long-term use, Anxiety, Depression, GERD (gastroesophageal reflux disease), Hypothyroidism, Migraine headache, and Stroke.   PSXH Dori has a past surgical history that includes Hysterectomy;  section; and Brain surgery.    Dori's family history includes Arthritis in her maternal grandmother; Depression in her mother and sister; Heart disease in her father and mother; Kidney disease in her sister; Stroke in her mother.    Dori reports that she quit smoking about 13 months ago. Her smoking use included cigarettes. She has a 8.50 pack-year smoking history. She has never used smokeless tobacco. She reports current alcohol use. She reports that she does not currently use drugs.   DAI Meadows is allergic to latex, natural rubber and latex.   MAYANK Meadows has a current medication list which includes the following prescription(s): atenolol, atorvastatin, escitalopram oxalate, levothyroxine, omeprazole, warfarin, pregabalin, trazodone, [DISCONTINUED] clopidogrel, and [DISCONTINUED] duloxetine.     Review of Systems   Constitutional:  Negative for activity change, appetite change, chills, fatigue and fever.   Eyes:  Negative for visual disturbance.   Respiratory:  Negative for cough and shortness of breath.    Cardiovascular:  Negative for chest pain, palpitations and leg swelling.   Gastrointestinal:  Negative for abdominal pain, nausea and vomiting.   Musculoskeletal:  Positive for arthralgias and gait problem.   Skin:  Negative  "for wound.   Neurological:  Negative for dizziness and headaches.   Psychiatric/Behavioral:  Negative for confusion.    Objective     /78 (BP Location: Right arm, Patient Position: Sitting, BP Method: Medium (Manual))   Pulse 67   Resp 16   Ht 5' 2.5" (1.588 m)   Wt 83 kg (183 lb)   LMP  (LMP Unknown)   SpO2 97%   BMI 32.94 kg/m²     Physical Exam   Constitutional: normal appearance. She appears well-developed, well-nourished and obese.  Non-toxic appearance. No distress. She does not appear ill.   HENT:   Head: Normocephalic and atraumatic.   Eyes: Right eye exhibits no discharge. Left eye exhibits no discharge.   Cardiovascular: Normal rate, regular rhythm, normal heart sounds and normal pulses. Exam reveals no gallop and no friction rub.   No murmur heard.Pulmonary:      Effort: Pulmonary effort is normal. No respiratory distress.      Breath sounds: Normal breath sounds. No wheezing, rhonchi or rales.     Abdominal: Normal appearance.   Musculoskeletal:      Cervical back: Neck supple.   Lymphadenopathy:     She has no cervical adenopathy.   Neurological: She is alert.   Skin: Skin is warm and dry. Capillary refill takes less than 2 seconds. She is not diaphoretic.   Psychiatric: Her behavior is normal. Mood, judgment and thought content normal.   Vitals reviewed.   Assessment/Plan     Dori was seen today for follow-up.    Diagnoses and all orders for this visit:    Peripheral polyneuropathy  -     pregabalin (LYRICA) 150 MG capsule; Take 1 capsule (150 mg total) by mouth 3 (three) times daily.    Sleep disturbance  -     traZODone (DESYREL) 100 MG tablet; Take 2 tablets (200 mg total) by mouth every evening.    Acquired hypothyroidism    Severe episode of recurrent major depressive disorder, without psychotic features    Anxiety    Intractable migraine without aura and with status migrainosus        Follow up in about 6 months (around 4/5/2023), or if symptoms worsen or fail to " improve.    Future Appointments   Date Time Provider Department Center   10/17/2022  3:00 PM [facility], LABORATORY [facility] LAB [facility]   4/5/2023  1:20 PM Feliciano Acosta MD [facility]       Feliciano Acosta MD  Family Medicine  [facility]

## 2022-10-18 ENCOUNTER — ANTI-COAG VISIT (OUTPATIENT)
Dept: CARDIOLOGY | Facility: CLINIC | Age: 32
End: 2022-10-18
Payer: OTHER MISCELLANEOUS

## 2022-10-18 ENCOUNTER — LAB VISIT (OUTPATIENT)
Dept: LAB | Facility: HOSPITAL | Age: 32
End: 2022-10-18
Attending: PSYCHIATRY & NEUROLOGY

## 2022-10-18 DIAGNOSIS — I63.411 EMBOLIC STROKE INVOLVING RIGHT MIDDLE CEREBRAL ARTERY: ICD-10-CM

## 2022-10-18 DIAGNOSIS — I63.411 EMBOLIC STROKE INVOLVING RIGHT MIDDLE CEREBRAL ARTERY: Primary | ICD-10-CM

## 2022-10-18 DIAGNOSIS — Z79.01 LONG TERM (CURRENT) USE OF ANTICOAGULANTS: ICD-10-CM

## 2022-10-18 LAB
INR PPP: 1.6 (ref 0.8–1.2)
PROTHROMBIN TIME: 16.6 SEC (ref 9–12.5)

## 2022-10-18 PROCEDURE — 93793 PR ANTICOAGULANT MGMT FOR PT TAKING WARFARIN: ICD-10-PCS | Mod: S$GLB,,,

## 2022-10-18 PROCEDURE — 93793 ANTICOAG MGMT PT WARFARIN: CPT | Mod: S$GLB,,,

## 2022-10-18 PROCEDURE — 85610 PROTHROMBIN TIME: CPT | Performed by: PSYCHIATRY & NEUROLOGY

## 2022-10-18 PROCEDURE — 36415 COLL VENOUS BLD VENIPUNCTURE: CPT | Performed by: PSYCHIATRY & NEUROLOGY

## 2022-10-18 NOTE — PROGRESS NOTES
INR not at goal. Medications, chart, and patient findings reviewed. 10mg 10/18 boost and resume per calendar. Noted patient has been taking warfarin 5mg every other day by accident. See calendar for adjustments to dose and follow up plan.

## 2022-10-24 ENCOUNTER — ANTI-COAG VISIT (OUTPATIENT)
Dept: CARDIOLOGY | Facility: CLINIC | Age: 32
End: 2022-10-24
Payer: OTHER MISCELLANEOUS

## 2022-10-24 ENCOUNTER — LAB VISIT (OUTPATIENT)
Dept: LAB | Facility: HOSPITAL | Age: 32
End: 2022-10-24
Attending: FAMILY MEDICINE

## 2022-10-24 DIAGNOSIS — I63.411 EMBOLIC STROKE INVOLVING RIGHT MIDDLE CEREBRAL ARTERY: ICD-10-CM

## 2022-10-24 DIAGNOSIS — I63.411 EMBOLIC STROKE INVOLVING RIGHT MIDDLE CEREBRAL ARTERY: Primary | ICD-10-CM

## 2022-10-24 DIAGNOSIS — Z79.01 LONG TERM (CURRENT) USE OF ANTICOAGULANTS: ICD-10-CM

## 2022-10-24 LAB
INR PPP: 4.1 (ref 0.8–1.2)
PROTHROMBIN TIME: 39.5 SEC (ref 9–12.5)

## 2022-10-24 PROCEDURE — 85610 PROTHROMBIN TIME: CPT | Performed by: PSYCHIATRY & NEUROLOGY

## 2022-10-24 PROCEDURE — 93793 PR ANTICOAGULANT MGMT FOR PT TAKING WARFARIN: ICD-10-PCS | Mod: S$GLB,,,

## 2022-10-24 PROCEDURE — 36415 COLL VENOUS BLD VENIPUNCTURE: CPT | Performed by: PSYCHIATRY & NEUROLOGY

## 2022-10-24 PROCEDURE — 93793 ANTICOAG MGMT PT WARFARIN: CPT | Mod: S$GLB,,,

## 2022-11-03 ENCOUNTER — ANTI-COAG VISIT (OUTPATIENT)
Dept: CARDIOLOGY | Facility: CLINIC | Age: 32
End: 2022-11-03
Payer: OTHER MISCELLANEOUS

## 2022-11-03 ENCOUNTER — LAB VISIT (OUTPATIENT)
Dept: LAB | Facility: HOSPITAL | Age: 32
End: 2022-11-03
Attending: PSYCHIATRY & NEUROLOGY

## 2022-11-03 ENCOUNTER — PATIENT MESSAGE (OUTPATIENT)
Dept: NEUROLOGY | Facility: CLINIC | Age: 32
End: 2022-11-03
Payer: OTHER MISCELLANEOUS

## 2022-11-03 ENCOUNTER — HOSPITAL ENCOUNTER (EMERGENCY)
Facility: HOSPITAL | Age: 32
Discharge: HOME | End: 2022-11-03
Attending: FAMILY MEDICINE

## 2022-11-03 VITALS
DIASTOLIC BLOOD PRESSURE: 98 MMHG | SYSTOLIC BLOOD PRESSURE: 122 MMHG | HEIGHT: 62 IN | BODY MASS INDEX: 33.86 KG/M2 | WEIGHT: 184 LBS | HEART RATE: 74 BPM | TEMPERATURE: 98 F | OXYGEN SATURATION: 97 % | RESPIRATION RATE: 16 BRPM

## 2022-11-03 DIAGNOSIS — K62.5 RECTAL BLEEDING: Primary | ICD-10-CM

## 2022-11-03 DIAGNOSIS — Z79.01 LONG TERM (CURRENT) USE OF ANTICOAGULANTS: Primary | ICD-10-CM

## 2022-11-03 DIAGNOSIS — R07.89 DISCOMFORT IN CHEST: ICD-10-CM

## 2022-11-03 DIAGNOSIS — I63.411 EMBOLIC STROKE INVOLVING RIGHT MIDDLE CEREBRAL ARTERY: ICD-10-CM

## 2022-11-03 DIAGNOSIS — I67.9 CEREBROVASCULAR DISEASE: ICD-10-CM

## 2022-11-03 DIAGNOSIS — Z79.01 LONG TERM (CURRENT) USE OF ANTICOAGULANTS: ICD-10-CM

## 2022-11-03 LAB
ABO + RH BLD: NORMAL
ALBUMIN SERPL BCP-MCNC: 3.9 G/DL (ref 3.5–5.2)
ALP SERPL-CCNC: 82 U/L (ref 55–135)
ALT SERPL W/O P-5'-P-CCNC: 35 U/L (ref 10–44)
ANION GAP SERPL CALC-SCNC: 9 MMOL/L (ref 8–16)
APTT BLDCRRT: 62.6 SEC (ref 21–32)
AST SERPL-CCNC: 30 U/L (ref 10–40)
B-HCG UR QL: NEGATIVE
BASOPHILS # BLD AUTO: 0.06 K/UL (ref 0–0.2)
BASOPHILS NFR BLD: 0.6 % (ref 0–1.9)
BILIRUB SERPL-MCNC: 0.5 MG/DL (ref 0.1–1)
BLD GP AB SCN CELLS X3 SERPL QL: NORMAL
BUN SERPL-MCNC: 11 MG/DL (ref 6–20)
CALCIUM SERPL-MCNC: 9.1 MG/DL (ref 8.7–10.5)
CHLORIDE SERPL-SCNC: 107 MMOL/L (ref 95–110)
CO2 SERPL-SCNC: 24 MMOL/L (ref 23–29)
CREAT SERPL-MCNC: 0.8 MG/DL (ref 0.5–1.4)
DIFFERENTIAL METHOD: NORMAL
EOSINOPHIL # BLD AUTO: 0.2 K/UL (ref 0–0.5)
EOSINOPHIL NFR BLD: 2.6 % (ref 0–8)
ERYTHROCYTE [DISTWIDTH] IN BLOOD BY AUTOMATED COUNT: 12.9 % (ref 11.5–14.5)
EST. GFR  (NO RACE VARIABLE): >60 ML/MIN/1.73 M^2
GLUCOSE SERPL-MCNC: 86 MG/DL (ref 70–110)
HCT VFR BLD AUTO: 44 % (ref 37–48.5)
HGB BLD-MCNC: 14.5 G/DL (ref 12–16)
IMM GRANULOCYTES # BLD AUTO: 0.02 K/UL (ref 0–0.04)
IMM GRANULOCYTES NFR BLD AUTO: 0.2 % (ref 0–0.5)
INR PPP: 6 (ref 0.8–1.2)
INR PPP: 6.2 (ref 0.8–1.2)
LYMPHOCYTES # BLD AUTO: 2.9 K/UL (ref 1–4.8)
LYMPHOCYTES NFR BLD: 30.7 % (ref 18–48)
MCH RBC QN AUTO: 30.1 PG (ref 27–31)
MCHC RBC AUTO-ENTMCNC: 33 G/DL (ref 32–36)
MCV RBC AUTO: 92 FL (ref 82–98)
MONOCYTES # BLD AUTO: 0.5 K/UL (ref 0.3–1)
MONOCYTES NFR BLD: 5.3 % (ref 4–15)
NEUTROPHILS # BLD AUTO: 5.6 K/UL (ref 1.8–7.7)
NEUTROPHILS NFR BLD: 60.6 % (ref 38–73)
NRBC BLD-RTO: 0 /100 WBC
OB PNL STL: POSITIVE
PLATELET # BLD AUTO: 223 K/UL (ref 150–450)
PMV BLD AUTO: 11.3 FL (ref 9.2–12.9)
POTASSIUM SERPL-SCNC: 4.2 MMOL/L (ref 3.5–5.1)
PROT SERPL-MCNC: 7.4 G/DL (ref 6–8.4)
PROTHROMBIN TIME: 57.1 SEC (ref 9–12.5)
PROTHROMBIN TIME: 58.9 SEC (ref 9–12.5)
RBC # BLD AUTO: 4.81 M/UL (ref 4–5.4)
SODIUM SERPL-SCNC: 140 MMOL/L (ref 136–145)
TROPONIN I SERPL DL<=0.01 NG/ML-MCNC: <0.006 NG/ML (ref 0–0.03)
WBC # BLD AUTO: 9.27 K/UL (ref 3.9–12.7)

## 2022-11-03 PROCEDURE — 70450 CT HEAD WITHOUT CONTRAST: ICD-10-PCS | Mod: 26,,, | Performed by: RADIOLOGY

## 2022-11-03 PROCEDURE — 36000 PLACE NEEDLE IN VEIN: CPT

## 2022-11-03 PROCEDURE — 85610 PROTHROMBIN TIME: CPT | Mod: 91 | Performed by: PSYCHIATRY & NEUROLOGY

## 2022-11-03 PROCEDURE — 36415 COLL VENOUS BLD VENIPUNCTURE: CPT | Performed by: FAMILY MEDICINE

## 2022-11-03 PROCEDURE — 93010 ELECTROCARDIOGRAM REPORT: CPT | Mod: ,,, | Performed by: INTERNAL MEDICINE

## 2022-11-03 PROCEDURE — 80053 COMPREHEN METABOLIC PANEL: CPT | Performed by: FAMILY MEDICINE

## 2022-11-03 PROCEDURE — 84484 ASSAY OF TROPONIN QUANT: CPT | Performed by: FAMILY MEDICINE

## 2022-11-03 PROCEDURE — 93010 EKG 12-LEAD: ICD-10-PCS | Mod: ,,, | Performed by: INTERNAL MEDICINE

## 2022-11-03 PROCEDURE — 85730 THROMBOPLASTIN TIME PARTIAL: CPT | Performed by: FAMILY MEDICINE

## 2022-11-03 PROCEDURE — 93793 ANTICOAG MGMT PT WARFARIN: CPT | Mod: S$GLB,,,

## 2022-11-03 PROCEDURE — 96372 THER/PROPH/DIAG INJ SC/IM: CPT | Performed by: FAMILY MEDICINE

## 2022-11-03 PROCEDURE — 82272 OCCULT BLD FECES 1-3 TESTS: CPT | Performed by: FAMILY MEDICINE

## 2022-11-03 PROCEDURE — 36415 COLL VENOUS BLD VENIPUNCTURE: CPT | Performed by: PSYCHIATRY & NEUROLOGY

## 2022-11-03 PROCEDURE — 93005 ELECTROCARDIOGRAM TRACING: CPT

## 2022-11-03 PROCEDURE — 85610 PROTHROMBIN TIME: CPT | Performed by: FAMILY MEDICINE

## 2022-11-03 PROCEDURE — 81025 URINE PREGNANCY TEST: CPT | Performed by: FAMILY MEDICINE

## 2022-11-03 PROCEDURE — 70450 CT HEAD/BRAIN W/O DYE: CPT | Mod: 26,,, | Performed by: RADIOLOGY

## 2022-11-03 PROCEDURE — 85025 COMPLETE CBC W/AUTO DIFF WBC: CPT | Performed by: FAMILY MEDICINE

## 2022-11-03 PROCEDURE — 86803 HEPATITIS C AB TEST: CPT | Performed by: FAMILY MEDICINE

## 2022-11-03 PROCEDURE — 86850 RBC ANTIBODY SCREEN: CPT | Performed by: FAMILY MEDICINE

## 2022-11-03 PROCEDURE — 87389 HIV-1 AG W/HIV-1&-2 AB AG IA: CPT | Performed by: FAMILY MEDICINE

## 2022-11-03 PROCEDURE — 93793 PR ANTICOAGULANT MGMT FOR PT TAKING WARFARIN: ICD-10-PCS | Mod: S$GLB,,,

## 2022-11-03 PROCEDURE — 63600175 PHARM REV CODE 636 W HCPCS: Performed by: FAMILY MEDICINE

## 2022-11-03 PROCEDURE — 70450 CT HEAD/BRAIN W/O DYE: CPT | Mod: TC

## 2022-11-03 PROCEDURE — 99285 EMERGENCY DEPT VISIT HI MDM: CPT | Mod: 25

## 2022-11-03 RX ORDER — WARFARIN SODIUM 5 MG/1
5 TABLET ORAL DAILY
Qty: 30 TABLET | Refills: 11 | Status: SHIPPED | OUTPATIENT
Start: 2022-11-03 | End: 2023-11-03

## 2022-11-03 RX ORDER — PHYTONADIONE 10 MG/ML
2.5 INJECTION, EMULSION INTRAMUSCULAR; INTRAVENOUS; SUBCUTANEOUS
Status: COMPLETED | OUTPATIENT
Start: 2022-11-03 | End: 2022-11-03

## 2022-11-03 RX ORDER — PHYTONADIONE 5 MG/1
5 TABLET ORAL
Status: DISCONTINUED | OUTPATIENT
Start: 2022-11-03 | End: 2022-11-03 | Stop reason: CLARIF

## 2022-11-03 RX ADMIN — PHYTONADIONE 2.5 MG: 10 INJECTION, EMULSION INTRAMUSCULAR; INTRAVENOUS; SUBCUTANEOUS at 03:11

## 2022-11-03 NOTE — DISCHARGE INSTRUCTIONS
Hold Coumadin dose tonight on 11/03/2022.  Follow-up with Coumadin clinic tomorrow on 11/04/2022, call clinic to for further recommendations as to Coumadin dosing.  Referral to gastroenterology has been made.  Please follow-up with them.

## 2022-11-04 ENCOUNTER — PATIENT MESSAGE (OUTPATIENT)
Dept: NEUROLOGY | Facility: CLINIC | Age: 32
End: 2022-11-04
Payer: OTHER MISCELLANEOUS

## 2022-11-04 ENCOUNTER — TELEPHONE (OUTPATIENT)
Dept: NEUROLOGY | Facility: CLINIC | Age: 32
End: 2022-11-04
Payer: OTHER MISCELLANEOUS

## 2022-11-04 LAB
HCV AB SERPL QL IA: NORMAL
HIV 1+2 AB+HIV1 P24 AG SERPL QL IA: NORMAL

## 2022-11-04 NOTE — TELEPHONE ENCOUNTER
Received notification regarding critical lab of INR.  Called patient who was already in ED  Called her coumadin clinic who had already been notified  As mentioned, patient already in ED

## 2022-11-04 NOTE — ED PROVIDER NOTES
Encounter Date: 11/3/2022       History     Chief Complaint   Patient presents with    Rectal Bleeding     For two weeks, sent by MD for low INR level        Patient comes our facility complaining of bright red blood per rectum.  Patient additionally was sent over from her [facility] Clinic for evaluation.  Patient has been on Coumadin due to recurrent strokes resistant to  anti-platelet therapy or Eliquis.  The patient has had history of rectal bleeding in April of this year.  Patient did not follow-up with Gastroenterology.  Patient denies any dyspnea on exertion, shortness of breath, chest pain.  Patient did not appear pale on evaluation.  Patient does have some rectal discomfort on bowel movements.  While in Coumadin Clinic, INR was noted to be 6.0.  Patient also describes right frontal headache without neurologic symptoms.    Review of patient's allergies indicates:   Allergen Reactions    Latex, natural rubber Rash    Latex Hives and Rash     Past Medical History:   Diagnosis Date    Anticoagulant long-term use     Anxiety     Depression     GERD (gastroesophageal reflux disease)     Hypothyroidism     Migraine headache     Stroke      Past Surgical History:   Procedure Laterality Date    BRAIN SURGERY       SECTION      HYSTERECTOMY       Family History   Problem Relation Age of Onset    Depression Mother     Heart disease Mother     Stroke Mother     Heart disease Father     Kidney disease Sister     Depression Sister     Arthritis Maternal Grandmother      Social History     Tobacco Use    Smoking status: Former     Packs/day: 0.50     Years: 17.00     Pack years: 8.50     Types: Cigarettes     Quit date: 2021     Years since quittin.1    Smokeless tobacco: Never   Substance Use Topics    Alcohol use: Yes     Comment: occ.     Drug use: Not Currently     Review of Systems   Gastrointestinal:  Positive for blood in stool and rectal pain (Discomfort with bowel movements).   Neurological:   Positive for headaches.   All other systems reviewed and are negative.    Physical Exam     Initial Vitals [11/03/22 1259]   BP Pulse Resp Temp SpO2   123/84 89 18 98.4 °F (36.9 °C) 99 %      MAP       --         Physical Exam    Nursing note and vitals reviewed.  Constitutional: She appears well-developed and well-nourished. She is not diaphoretic. No distress.   HENT:   Head: Normocephalic and atraumatic.   Nose: Nose normal.   Mouth/Throat: Oropharynx is clear and moist.   Eyes: Conjunctivae and EOM are normal. Pupils are equal, round, and reactive to light. Right eye exhibits no discharge. Left eye exhibits no discharge. No scleral icterus.   Neck: Neck supple. No thyromegaly present.   Normal range of motion.  Cardiovascular:  Normal rate, regular rhythm, normal heart sounds and intact distal pulses.           No murmur heard.  Pulmonary/Chest: Breath sounds normal. No respiratory distress. She has no wheezes.   Abdominal: Abdomen is soft. Bowel sounds are normal. She exhibits no distension and no mass. There is no abdominal tenderness. There is no rebound and no guarding.   Genitourinary: Rectum:      Guaiac result positive.   Guaiac positive stool.    Genitourinary Comments: JOVANA:  Patient had normal rectal tone.  No anal fissures, external hemorrhoids, internal hemorrhoids were noted on exam.       Musculoskeletal:         General: No tenderness or edema. Normal range of motion.      Cervical back: Normal range of motion and neck supple.     Lymphadenopathy:     She has no cervical adenopathy.   Neurological: She is alert and oriented to person, place, and time. GCS score is 15. GCS eye subscore is 4. GCS verbal subscore is 5. GCS motor subscore is 6.   Skin: Skin is warm and dry. Capillary refill takes less than 2 seconds. No rash noted. No erythema. No pallor.   Psychiatric: She has a normal mood and affect. Her behavior is normal. Judgment and thought content normal.     Patient voiced understanding with  plan.    ED Course   Procedures  Labs Reviewed   PROTIME-INR - Abnormal; Notable for the following components:       Result Value    Prothrombin Time 58.9 (*)     INR 6.2 (*)     All other components within normal limits    Narrative:      inr   critical result(s) called and verbal readback obtained from   Gayle Sauceda RN by [facility] 11/03/2022 15:15   APTT - Abnormal; Notable for the following components:    aPTT 62.6 (*)     All other components within normal limits   OCCULT BLOOD X 1, STOOL - Abnormal; Notable for the following components:    Occult Blood Positive (*)     All other components within normal limits   CBC W/ AUTO DIFFERENTIAL   COMPREHENSIVE METABOLIC PANEL   PREGNANCY TEST, URINE RAPID    Narrative:     Specimen Source->Urine   TROPONIN I   TROPONIN I   HIV 1 / 2 ANTIBODY   HEPATITIS C ANTIBODY   TYPE & SCREEN          Imaging Results              CT Head Without Contrast (Final result)  Result time 11/03/22 15:21:35      Final result by Henry Salgado MD (11/03/22 15:21:35)                   Impression:      Cortical atrophy with periventricular deep white matter change consistent with chronic small vessel ischemic disease.    Chronic left occipital focal encephalomalacia.      Electronically signed by: Henry Salgado  Date:    11/03/2022  Time:    15:21               Narrative:    EXAMINATION:  CT HEAD WITHOUT CONTRAST    CLINICAL HISTORY:  inr 6.0, headache;    TECHNIQUE:  Low dose axial images were obtained through the head.  Coronal and sagittal reformations were also performed. Contrast was not administered.    COMPARISON:  CT 03/29/2022.    FINDINGS:  There is no acute hemorrhage or infarction.  There is cortical atrophy.  There are periventricular deep white matter changes consistent with chronic small vessel ischemic disease.  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    No extra-axial fluid collections.  Ventricles are normal in size, shape and configuration.  The  basal cisterns are patent.    The imaged paranasal sinuses and ethmoid air cells are well aerated.    The mastoid air cells and middle ears are normally pneumatized.                                       Medications   phytonadione vitamin k injection 2.5 mg (2.5 mg Subcutaneous Given 11/3/22 3641)     Medical Decision Making:   ED Management:   Patient reported bright red blood per rectum.  Patient had INR of 6.0.  CT of head showed no intracranial hemorrhage.  Stool guaiac study was positive for blood.  I did discuss case with Coumadin clinic.  They are going to continue to manage patient's Coumadin.  Hemoglobin was greater than 14.  Minimal change from previous labs in April of this year.  Patient exhibits no signs of anemia.  No obvious rectal source for bleeding on exam.    Ambulatory gastroenterology consult has been made.  I encouraged patient to follow-up with them.  Additionally, patient will call the Coumadin Clinic in the a.m. and discuss plan for Coumadin management.  Low-dose vitamin K was administered return to ER visit.  This was discussed with Coumadin clinic as well.                        Clinical Impression:   Final diagnoses:  [R07.89] Discomfort in chest  [K62.5] Rectal bleeding (Primary)  [I67.9] Cerebrovascular disease        ED Disposition Condition    Discharge Stable          ED Prescriptions       Medication Sig Dispense Start Date End Date Auth. Provider    warfarin (COUMADIN) 5 MG tablet Take 1 tablet (5 mg total) by mouth Daily. 30 tablet 11/3/2022 11/3/2023 Steve Singh MD          Follow-up Information    None       Follow-up with primary care physician.  GI referral has been made.  Please follow-up with them.  Additionally, call Coumadin Clinic in the a.m. and schedule follow-up with them as well as planned with Coumadin management.     Steve Singh MD  11/03/22 8042

## 2022-11-04 NOTE — PROGRESS NOTES
"INR not at goal. Medications, chart, and patient findings reviewed. Patient reports nausea and diarrhea that started ~1 week ago, and has not taken anything to help relieve symptoms.  Reports "a lot' of bright red blood in stool with rectal pain that started ~2 weeks ago. She has been taking her husbands  2019 warfarin tablets for the past week. She confirmed that the tablets are the same dose.     Patient was advised to go to ED due to bleeding. Patient did go and was advised by ED doctor to hold her warfarin on 11/3 and was administered Vitamin K subcutaneously while there. I spoke with the ER physician taking care of the patient and recommended that elevated INR could be contributed to diarrhea that's been going on, especially if the patient did not have her usual appetite. Of note, patient has a history of bleeding from her rectum but has not followed up with GI per ER notes. Will give patient a reduced dose today as she was administered Vitamin K and will not need additional held doses. Will also decrease weekly dose.    Also of note, Dr. Calderon has requested that the patient's warfarin info no longer be sent to her as she does not manage her warfarin. Messaged PCP to see if she would assume care for the patient, awaiting reply.      "

## 2022-11-07 ENCOUNTER — LAB VISIT (OUTPATIENT)
Dept: LAB | Facility: HOSPITAL | Age: 32
End: 2022-11-07
Attending: PSYCHIATRY & NEUROLOGY

## 2022-11-07 ENCOUNTER — ANTI-COAG VISIT (OUTPATIENT)
Dept: CARDIOLOGY | Facility: CLINIC | Age: 32
End: 2022-11-07
Payer: OTHER MISCELLANEOUS

## 2022-11-07 DIAGNOSIS — I63.411 EMBOLIC STROKE INVOLVING RIGHT MIDDLE CEREBRAL ARTERY: Primary | ICD-10-CM

## 2022-11-07 DIAGNOSIS — Z79.01 LONG TERM (CURRENT) USE OF ANTICOAGULANTS: ICD-10-CM

## 2022-11-07 DIAGNOSIS — I63.411 EMBOLIC STROKE INVOLVING RIGHT MIDDLE CEREBRAL ARTERY: ICD-10-CM

## 2022-11-07 LAB
INR PPP: 1.1 (ref 0.8–1.2)
PROTHROMBIN TIME: 11.9 SEC (ref 9–12.5)

## 2022-11-07 PROCEDURE — 93793 PR ANTICOAGULANT MGMT FOR PT TAKING WARFARIN: ICD-10-PCS | Mod: S$GLB,,,

## 2022-11-07 PROCEDURE — 93793 ANTICOAG MGMT PT WARFARIN: CPT | Mod: S$GLB,,,

## 2022-11-07 PROCEDURE — 85610 PROTHROMBIN TIME: CPT | Performed by: PSYCHIATRY & NEUROLOGY

## 2022-11-07 PROCEDURE — 36415 COLL VENOUS BLD VENIPUNCTURE: CPT | Performed by: PSYCHIATRY & NEUROLOGY

## 2022-11-07 NOTE — PROGRESS NOTES
INR not at goal. Medications, chart, and patient findings reviewed. Patient had some blood in her diarrhea on Friday but has not seen any since. See calendar for adjustments to dose and follow up plan.

## 2022-11-14 ENCOUNTER — LAB VISIT (OUTPATIENT)
Dept: LAB | Facility: HOSPITAL | Age: 32
End: 2022-11-14
Attending: PSYCHIATRY & NEUROLOGY

## 2022-11-14 DIAGNOSIS — Z79.01 LONG TERM (CURRENT) USE OF ANTICOAGULANTS: ICD-10-CM

## 2022-11-14 LAB
INR PPP: 3.6 (ref 0.8–1.2)
PROTHROMBIN TIME: 35.9 SEC (ref 9–12.5)

## 2022-11-14 PROCEDURE — 85610 PROTHROMBIN TIME: CPT | Performed by: FAMILY MEDICINE

## 2022-11-14 PROCEDURE — 36415 COLL VENOUS BLD VENIPUNCTURE: CPT | Performed by: FAMILY MEDICINE

## 2022-11-15 ENCOUNTER — ANTI-COAG VISIT (OUTPATIENT)
Dept: CARDIOLOGY | Facility: CLINIC | Age: 32
End: 2022-11-15
Payer: OTHER MISCELLANEOUS

## 2022-11-15 DIAGNOSIS — Z79.01 LONG TERM (CURRENT) USE OF ANTICOAGULANTS: ICD-10-CM

## 2022-11-15 DIAGNOSIS — I63.411 EMBOLIC STROKE INVOLVING RIGHT MIDDLE CEREBRAL ARTERY: Primary | ICD-10-CM

## 2022-11-15 PROCEDURE — 93793 PR ANTICOAGULANT MGMT FOR PT TAKING WARFARIN: ICD-10-PCS | Mod: S$GLB,,,

## 2022-11-15 PROCEDURE — 93793 ANTICOAG MGMT PT WARFARIN: CPT | Mod: S$GLB,,,

## 2022-11-15 NOTE — PROGRESS NOTES
INR not at goal. Medications, chart, and patient findings reviewed. Patient reports facial swelling thought to be caused by a tooth infection, has had diarrhea since Sunday, and had 3 beers on Saturday and Sunday. Would recommend that patient be seen by a dentist for evaluation of her tooth infection to ensure infection does not worsen. May take Imodium for diarrhea if continues to worsen. See calendar for adjustments to dose and follow up plan.

## 2022-11-22 ENCOUNTER — ANTI-COAG VISIT (OUTPATIENT)
Dept: CARDIOLOGY | Facility: CLINIC | Age: 32
End: 2022-11-22
Payer: OTHER MISCELLANEOUS

## 2022-11-22 ENCOUNTER — LAB VISIT (OUTPATIENT)
Dept: LAB | Facility: HOSPITAL | Age: 32
End: 2022-11-22
Attending: FAMILY MEDICINE

## 2022-11-22 DIAGNOSIS — I63.411 EMBOLIC STROKE INVOLVING RIGHT MIDDLE CEREBRAL ARTERY: Primary | ICD-10-CM

## 2022-11-22 DIAGNOSIS — Z79.01 LONG TERM (CURRENT) USE OF ANTICOAGULANTS: ICD-10-CM

## 2022-11-22 LAB
INR PPP: 4 (ref 0.8–1.2)
PROTHROMBIN TIME: 39 SEC (ref 9–12.5)

## 2022-11-22 PROCEDURE — 93793 PR ANTICOAGULANT MGMT FOR PT TAKING WARFARIN: ICD-10-PCS | Mod: S$GLB,,,

## 2022-11-22 PROCEDURE — 93793 ANTICOAG MGMT PT WARFARIN: CPT | Mod: S$GLB,,,

## 2022-11-22 PROCEDURE — 36415 COLL VENOUS BLD VENIPUNCTURE: CPT | Performed by: FAMILY MEDICINE

## 2022-11-22 PROCEDURE — 85610 PROTHROMBIN TIME: CPT | Performed by: FAMILY MEDICINE

## 2022-11-30 ENCOUNTER — LAB VISIT (OUTPATIENT)
Dept: LAB | Facility: HOSPITAL | Age: 32
End: 2022-11-30
Attending: FAMILY MEDICINE

## 2022-11-30 ENCOUNTER — ANTI-COAG VISIT (OUTPATIENT)
Dept: CARDIOLOGY | Facility: CLINIC | Age: 32
End: 2022-11-30
Payer: OTHER MISCELLANEOUS

## 2022-11-30 ENCOUNTER — OFFICE VISIT (OUTPATIENT)
Dept: FAMILY MEDICINE | Facility: CLINIC | Age: 32
End: 2022-11-30

## 2022-11-30 VITALS
DIASTOLIC BLOOD PRESSURE: 76 MMHG | BODY MASS INDEX: 34.96 KG/M2 | SYSTOLIC BLOOD PRESSURE: 116 MMHG | HEIGHT: 62 IN | WEIGHT: 190 LBS | RESPIRATION RATE: 16 BRPM | HEART RATE: 75 BPM | OXYGEN SATURATION: 99 %

## 2022-11-30 DIAGNOSIS — Z79.01 LONG TERM (CURRENT) USE OF ANTICOAGULANTS: ICD-10-CM

## 2022-11-30 DIAGNOSIS — G62.9 PERIPHERAL POLYNEUROPATHY: Primary | ICD-10-CM

## 2022-11-30 DIAGNOSIS — I63.411 EMBOLIC STROKE INVOLVING RIGHT MIDDLE CEREBRAL ARTERY: Primary | ICD-10-CM

## 2022-11-30 LAB
INR PPP: 4.4 (ref 0.8–1.2)
PROTHROMBIN TIME: 42.5 SEC (ref 9–12.5)

## 2022-11-30 PROCEDURE — 85610 PROTHROMBIN TIME: CPT | Performed by: FAMILY MEDICINE

## 2022-11-30 PROCEDURE — 36415 COLL VENOUS BLD VENIPUNCTURE: CPT | Performed by: FAMILY MEDICINE

## 2022-11-30 PROCEDURE — 99999 PR PBB SHADOW E&M-EST. PATIENT-LVL III: ICD-10-PCS | Mod: PBBFAC,,, | Performed by: FAMILY MEDICINE

## 2022-11-30 PROCEDURE — 93793 ANTICOAG MGMT PT WARFARIN: CPT | Mod: S$GLB,,,

## 2022-11-30 PROCEDURE — 99214 PR OFFICE/OUTPT VISIT, EST, LEVL IV, 30-39 MIN: ICD-10-PCS | Mod: S$PBB,,, | Performed by: FAMILY MEDICINE

## 2022-11-30 PROCEDURE — 99213 OFFICE O/P EST LOW 20 MIN: CPT | Mod: PBBFAC | Performed by: FAMILY MEDICINE

## 2022-11-30 PROCEDURE — 99214 OFFICE O/P EST MOD 30 MIN: CPT | Mod: S$PBB,,, | Performed by: FAMILY MEDICINE

## 2022-11-30 PROCEDURE — 93793 PR ANTICOAGULANT MGMT FOR PT TAKING WARFARIN: ICD-10-PCS | Mod: S$GLB,,,

## 2022-11-30 PROCEDURE — 99999 PR PBB SHADOW E&M-EST. PATIENT-LVL III: CPT | Mod: PBBFAC,,, | Performed by: FAMILY MEDICINE

## 2022-11-30 RX ORDER — PREGABALIN 200 MG/1
200 CAPSULE ORAL 3 TIMES DAILY
Qty: 90 CAPSULE | Refills: 5 | Status: SHIPPED | OUTPATIENT
Start: 2022-11-30 | End: 2023-05-31

## 2022-11-30 NOTE — PROGRESS NOTES
[facility] - Clinic Note    Subjective      Ms. Gamble is a 32 y.o. female who presents to clinic with complaints of leg pain.     Patient has polyneuropathy in bilateral legs. Was switched from gabapentin to lyrica 2 months ago.   It has helped but still occurring.  Constant aching and numb pain.   H/o CVA as well.    PM Dori has a past medical history of Anticoagulant long-term use, Anxiety, Depression, GERD (gastroesophageal reflux disease), Hypothyroidism, Migraine headache, and Stroke.   PSXH Dori has a past surgical history that includes Hysterectomy;  section; and Brain surgery.    Dori's family history includes Arthritis in her maternal grandmother; Depression in her mother and sister; Heart disease in her father and mother; Kidney disease in her sister; Stroke in her mother.    Dori reports that she quit smoking about 16 months ago. Her smoking use included cigarettes. She has a 8.50 pack-year smoking history. She has never used smokeless tobacco. She reports current alcohol use. She reports that she does not currently use drugs.   DAI Meadows is allergic to latex, natural rubber and latex.   MAYANK Meadows has a current medication list which includes the following prescription(s): atenolol, atorvastatin, escitalopram oxalate, levothyroxine, trazodone, warfarin, omeprazole, pregabalin, promethazine-dextromethorphan, [DISCONTINUED] clopidogrel, and [DISCONTINUED] duloxetine.     Review of Systems   Constitutional:  Negative for activity change, appetite change, chills, fatigue and fever.   Eyes:  Negative for visual disturbance.   Respiratory:  Negative for cough and shortness of breath.    Cardiovascular:  Negative for chest pain, palpitations and leg swelling.   Gastrointestinal:  Negative for abdominal pain, nausea and vomiting.   Musculoskeletal:  Positive for arthralgias.        Bilateral leg pain   Skin:  Negative for wound.   Neurological:  Positive for numbness.  "Negative for dizziness and headaches.   Psychiatric/Behavioral:  Negative for confusion.    Objective     /76 (BP Location: Left arm, Patient Position: Sitting, BP Method: Medium (Manual))   Pulse 75   Resp 16   Ht 5' 2" (1.575 m)   Wt 86.2 kg (190 lb)   LMP  (LMP Unknown)   SpO2 99%   BMI 34.75 kg/m²     Physical Exam   Constitutional: normal appearance. She appears well-developed, well-nourished and obese.  Non-toxic appearance. No distress. She does not appear ill.   HENT:   Head: Normocephalic and atraumatic.   Eyes: Right eye exhibits no discharge. Left eye exhibits no discharge.   Cardiovascular: Normal rate, regular rhythm, normal heart sounds and normal pulses. Exam reveals no gallop and no friction rub.   No murmur heard.Pulmonary:      Effort: Pulmonary effort is normal. No respiratory distress.      Breath sounds: Normal breath sounds. No wheezing, rhonchi or rales.     Abdominal: Normal appearance.   Musculoskeletal:      Cervical back: Neck supple.   Lymphadenopathy:     She has no cervical adenopathy.   Neurological: She is alert.   Skin: Skin is warm and dry. Capillary refill takes less than 2 seconds. She is not diaphoretic.   Psychiatric: Her behavior is normal. Mood, judgment and thought content normal.   Vitals reviewed.   Assessment/Plan     Dori was seen today for leg pain.    Diagnoses and all orders for this visit:    Peripheral polyneuropathy  -     pregabalin (LYRICA) 200 MG Cap; Take 1 capsule (200 mg total) by mouth 3 (three) times daily.      -increase lyrica to 200mg tid and monitor for improvement.    Follow up in about 3 months (around 2/28/2023), or if symptoms worsen or fail to improve.    Future Appointments   Date Time Provider Department Center   12/29/2022 12:10 PM [facility], LABORATORY [facility] LAB [facility]   1/11/2023  3:30 PM Pradip Restrepo MD [facility] NEURO [place]   1/17/2023  2:00 PM Jitendra Mitchell MD [facility] SPINE [place]   4/5/2023  1:20 PM Feliciano NO" MD [facility] Dulce Acosta MD  Family Medicine  [facility]

## 2022-11-30 NOTE — PROGRESS NOTES
"INR not at goal. Medications, chart, and patient findings reviewed. Patient reports having diarrhea twice this week; alcohol is consistent but maybe more than usual; did not eat her usual high vitk greens last week, but plans to resume "dark greens" this week. See calendar for adjustments to dose and follow up plan.      "

## 2022-12-07 ENCOUNTER — ANTI-COAG VISIT (OUTPATIENT)
Dept: CARDIOLOGY | Facility: CLINIC | Age: 32
End: 2022-12-07
Payer: OTHER MISCELLANEOUS

## 2022-12-07 ENCOUNTER — LAB VISIT (OUTPATIENT)
Dept: LAB | Facility: HOSPITAL | Age: 32
End: 2022-12-07
Attending: FAMILY MEDICINE

## 2022-12-07 DIAGNOSIS — Z79.01 LONG TERM (CURRENT) USE OF ANTICOAGULANTS: ICD-10-CM

## 2022-12-07 DIAGNOSIS — I63.411 EMBOLIC STROKE INVOLVING RIGHT MIDDLE CEREBRAL ARTERY: Primary | ICD-10-CM

## 2022-12-07 LAB
INR PPP: 2.1 (ref 0.8–1.2)
PROTHROMBIN TIME: 20.9 SEC (ref 9–12.5)

## 2022-12-07 PROCEDURE — 93793 PR ANTICOAGULANT MGMT FOR PT TAKING WARFARIN: ICD-10-PCS | Mod: S$GLB,,,

## 2022-12-07 PROCEDURE — 93793 ANTICOAG MGMT PT WARFARIN: CPT | Mod: S$GLB,,,

## 2022-12-07 PROCEDURE — 36415 COLL VENOUS BLD VENIPUNCTURE: CPT | Performed by: FAMILY MEDICINE

## 2022-12-07 PROCEDURE — 85610 PROTHROMBIN TIME: CPT | Performed by: FAMILY MEDICINE

## 2022-12-15 ENCOUNTER — ANTI-COAG VISIT (OUTPATIENT)
Dept: CARDIOLOGY | Facility: CLINIC | Age: 32
End: 2022-12-15
Payer: OTHER MISCELLANEOUS

## 2022-12-15 ENCOUNTER — LAB VISIT (OUTPATIENT)
Dept: LAB | Facility: HOSPITAL | Age: 32
End: 2022-12-15
Attending: FAMILY MEDICINE

## 2022-12-15 DIAGNOSIS — I63.411 EMBOLIC STROKE INVOLVING RIGHT MIDDLE CEREBRAL ARTERY: Primary | ICD-10-CM

## 2022-12-15 DIAGNOSIS — Z79.01 LONG TERM (CURRENT) USE OF ANTICOAGULANTS: ICD-10-CM

## 2022-12-15 LAB
INR PPP: 1.6 (ref 0.8–1.2)
PROTHROMBIN TIME: 16 SEC (ref 9–12.5)

## 2022-12-15 PROCEDURE — 93793 PR ANTICOAGULANT MGMT FOR PT TAKING WARFARIN: ICD-10-PCS | Mod: S$GLB,,,

## 2022-12-15 PROCEDURE — 36415 COLL VENOUS BLD VENIPUNCTURE: CPT | Performed by: FAMILY MEDICINE

## 2022-12-15 PROCEDURE — 85610 PROTHROMBIN TIME: CPT | Performed by: FAMILY MEDICINE

## 2022-12-15 PROCEDURE — 93793 ANTICOAG MGMT PT WARFARIN: CPT | Mod: S$GLB,,,

## 2022-12-15 NOTE — PROGRESS NOTES
INR not at goal. Medications, chart, and patient findings reviewed. Patient reports having a fever and diarrhea in the past week. Also reports kale and spinach on Monday. See calendar for adjustments to dose and follow up plan.

## 2022-12-18 ENCOUNTER — HOSPITAL ENCOUNTER (EMERGENCY)
Facility: HOSPITAL | Age: 32
Discharge: HOME | End: 2022-12-18
Attending: INTERNAL MEDICINE

## 2022-12-18 VITALS
SYSTOLIC BLOOD PRESSURE: 95 MMHG | HEART RATE: 63 BPM | WEIGHT: 184 LBS | RESPIRATION RATE: 18 BRPM | DIASTOLIC BLOOD PRESSURE: 72 MMHG | BODY MASS INDEX: 33.86 KG/M2 | TEMPERATURE: 98 F | HEIGHT: 62 IN | OXYGEN SATURATION: 98 %

## 2022-12-18 DIAGNOSIS — R05.9 COUGH, UNSPECIFIED TYPE: ICD-10-CM

## 2022-12-18 DIAGNOSIS — Z20.822 EXPOSURE TO COVID-19 VIRUS: ICD-10-CM

## 2022-12-18 DIAGNOSIS — U07.1 COVID-19: Primary | ICD-10-CM

## 2022-12-18 DIAGNOSIS — J06.9 VIRAL URI WITH COUGH: ICD-10-CM

## 2022-12-18 LAB
INFLUENZA A, MOLECULAR: NEGATIVE
INFLUENZA B, MOLECULAR: NEGATIVE
SARS-COV-2 RDRP RESP QL NAA+PROBE: POSITIVE
SPECIMEN SOURCE: NORMAL

## 2022-12-18 PROCEDURE — 71045 X-RAY EXAM CHEST 1 VIEW: CPT | Mod: TC

## 2022-12-18 PROCEDURE — 71045 X-RAY EXAM CHEST 1 VIEW: CPT | Mod: 26,,, | Performed by: RADIOLOGY

## 2022-12-18 PROCEDURE — 87502 INFLUENZA DNA AMP PROBE: CPT | Performed by: NURSE PRACTITIONER

## 2022-12-18 PROCEDURE — 71045 XR CHEST AP PORTABLE: ICD-10-PCS | Mod: 26,,, | Performed by: RADIOLOGY

## 2022-12-18 PROCEDURE — U0002 COVID-19 LAB TEST NON-CDC: HCPCS | Performed by: NURSE PRACTITIONER

## 2022-12-18 PROCEDURE — 99283 EMERGENCY DEPT VISIT LOW MDM: CPT | Mod: 25

## 2022-12-18 RX ORDER — PROMETHAZINE HYDROCHLORIDE AND DEXTROMETHORPHAN HYDROBROMIDE 6.25; 15 MG/5ML; MG/5ML
5 SYRUP ORAL EVERY 4 HOURS PRN
Qty: 120 ML | Refills: 0 | Status: SHIPPED | OUTPATIENT
Start: 2022-12-18 | End: 2022-12-28

## 2022-12-18 NOTE — ED PROVIDER NOTES
Encounter Date: 2022       History     Chief Complaint   Patient presents with    COVID-19 Concerns      has covid and pt has cough and lungs hurt     The history is provided by the patient.     The primary symptoms include fatigue, cough and arthralgias. Primary symptoms do not include fever. Primary symptoms comment: lungs painful while coughing. The current episode started yesterday. This is a new problem.   The onset of the illness is associated with exposure to sick contacts (COVID 19). Symptoms associated with the illness include chills and rhinorrhea. The illness is not associated with congestion. The following treatments were addressed: Acetaminophen was not tried.   Review of patient's allergies indicates:   Allergen Reactions    Latex, natural rubber Rash    Latex Hives and Rash     Past Medical History:   Diagnosis Date    Anticoagulant long-term use     Anxiety     Depression     GERD (gastroesophageal reflux disease)     Hypothyroidism     Migraine headache     Stroke      Past Surgical History:   Procedure Laterality Date    BRAIN SURGERY       SECTION      HYSTERECTOMY       Family History   Problem Relation Age of Onset    Depression Mother     Heart disease Mother     Stroke Mother     Heart disease Father     Kidney disease Sister     Depression Sister     Arthritis Maternal Grandmother      Social History     Tobacco Use    Smoking status: Former     Packs/day: 0.50     Years: 17.00     Pack years: 8.50     Types: Cigarettes     Quit date: 2021     Years since quittin.3    Smokeless tobacco: Never   Substance Use Topics    Alcohol use: Yes     Comment: occ.     Drug use: Not Currently     Review of Systems   Constitutional:  Positive for chills and fatigue. Negative for fever.   HENT:  Positive for rhinorrhea. Negative for congestion.    Respiratory:  Positive for cough.    Musculoskeletal:  Positive for arthralgias.   All other systems reviewed and are  negative.    Physical Exam     Initial Vitals [12/18/22 1141]   BP Pulse Resp Temp SpO2   95/72 63 18 98.1 °F (36.7 °C) 98 %      MAP       --         Physical Exam    Nursing note and vitals reviewed.  Constitutional: She appears well-developed and well-nourished. No distress.   HENT:   Head: Normocephalic and atraumatic.   Eyes: EOM are normal. Pupils are equal, round, and reactive to light.   Neck:   Normal range of motion.  Cardiovascular:  Normal rate and regular rhythm.           No murmur heard.  Pulmonary/Chest: Breath sounds normal. No respiratory distress. She has no wheezes. She has no rhonchi. She has no rales. She exhibits no tenderness.   Abdominal: Abdomen is soft.   Musculoskeletal:         General: Normal range of motion.      Cervical back: Normal range of motion.     Neurological: She is alert and oriented to person, place, and time. She has normal strength. GCS score is 15. GCS eye subscore is 4. GCS verbal subscore is 5. GCS motor subscore is 6.   Skin: Skin is warm and dry. Capillary refill takes less than 2 seconds.   Psychiatric: She has a normal mood and affect.       ED Course   Procedures  Labs Reviewed   SARS-COV-2 RNA AMPLIFICATION, QUAL - Abnormal; Notable for the following components:       Result Value    SARS-CoV-2 RNA, Amplification, Qual Positive (*)     All other components within normal limits    Narrative:     Is the patient symptomatic?->Yes   INFLUENZA A & B BY MOLECULAR          Imaging Results              X-Ray Chest AP Portable (Final result)  Result time 12/18/22 11:57:09      Final result by Shara Chairez MD (12/18/22 11:57:09)                   Impression:      No acute abnormality.      Electronically signed by: Shara Chairez MD  Date:    12/18/2022  Time:    11:57               Narrative:    EXAMINATION:  XR CHEST AP PORTABLE    CLINICAL HISTORY:  cough;    TECHNIQUE:  Single frontal view of the chest was performed.    COMPARISON:  None    FINDINGS:  The lungs  are clear, with normal appearance of pulmonary vasculature and no pleural effusion or pneumothorax.    The cardiac silhouette is normal in size. The hilar and mediastinal contours are unremarkable.    Bones are intact.                                       Medications - No data to display                           Clinical Impression:   Final diagnoses:  [Z20.822] Exposure to COVID-19 virus  [R05.9] Cough, unspecified type  [J06.9] Viral URI with cough  [U07.1] COVID-19 (Primary)        ED Disposition Condition    Discharge Stable          ED Prescriptions       Medication Sig Dispense Start Date End Date Auth. Provider    promethazine-dextromethorphan (PROMETHAZINE-DM) 6.25-15 mg/5 mL Syrp Take 5 mLs by mouth every 4 (four) hours as needed (cough). 120 mL 12/18/2022 12/28/2022 Elvis Galvan NP          Follow-up Information       Follow up With Specialties Details Why Contact Info    Feliciano Acosta MD Family Medicine In 2 days  [address] [facility] [address] [phone]      [facility] Emergency Dept Emergency Medicine  If symptoms worsen [address] [facility] [address] [phone]             Elvis Galvan NP  12/18/22 9232

## 2022-12-22 ENCOUNTER — LAB VISIT (OUTPATIENT)
Dept: LAB | Facility: HOSPITAL | Age: 32
End: 2022-12-22
Attending: FAMILY MEDICINE

## 2022-12-22 ENCOUNTER — ANTI-COAG VISIT (OUTPATIENT)
Dept: CARDIOLOGY | Facility: CLINIC | Age: 32
End: 2022-12-22
Payer: OTHER MISCELLANEOUS

## 2022-12-22 DIAGNOSIS — Z79.01 LONG TERM (CURRENT) USE OF ANTICOAGULANTS: ICD-10-CM

## 2022-12-22 DIAGNOSIS — I63.411 EMBOLIC STROKE INVOLVING RIGHT MIDDLE CEREBRAL ARTERY: Primary | ICD-10-CM

## 2022-12-22 LAB
INR PPP: 4.7 (ref 0.8–1.2)
PROTHROMBIN TIME: 45 SEC (ref 9–12.5)

## 2022-12-22 PROCEDURE — 85610 PROTHROMBIN TIME: CPT | Performed by: FAMILY MEDICINE

## 2022-12-22 PROCEDURE — 93793 ANTICOAG MGMT PT WARFARIN: CPT | Mod: S$GLB,,,

## 2022-12-22 PROCEDURE — 36415 COLL VENOUS BLD VENIPUNCTURE: CPT | Performed by: FAMILY MEDICINE

## 2022-12-22 PROCEDURE — 93793 PR ANTICOAGULANT MGMT FOR PT TAKING WARFARIN: ICD-10-PCS | Mod: S$GLB,,,

## 2022-12-22 NOTE — PROGRESS NOTES
INR not at goal. Medications, chart, and patient findings reviewed. Patient reports that she tested positive for COVID on 12/18, has not ate her normal Vitamin K intake, and drank 2 beers on 12/21. Changes this week including Vitamin K amount likely cause of elevated INR. See calendar for adjustments to dose and follow up plan.

## 2022-12-29 ENCOUNTER — LAB VISIT (OUTPATIENT)
Dept: LAB | Facility: HOSPITAL | Age: 32
End: 2022-12-29
Attending: FAMILY MEDICINE

## 2022-12-29 ENCOUNTER — ANTI-COAG VISIT (OUTPATIENT)
Dept: CARDIOLOGY | Facility: CLINIC | Age: 32
End: 2022-12-29
Payer: OTHER MISCELLANEOUS

## 2022-12-29 DIAGNOSIS — Z79.01 LONG TERM (CURRENT) USE OF ANTICOAGULANTS: ICD-10-CM

## 2022-12-29 DIAGNOSIS — I63.411 EMBOLIC STROKE INVOLVING RIGHT MIDDLE CEREBRAL ARTERY: Primary | ICD-10-CM

## 2022-12-29 LAB
INR PPP: 3.4 (ref 0.8–1.2)
PROTHROMBIN TIME: 32.8 SEC (ref 9–12.5)

## 2022-12-29 PROCEDURE — 36415 COLL VENOUS BLD VENIPUNCTURE: CPT | Performed by: FAMILY MEDICINE

## 2022-12-29 PROCEDURE — 93793 ANTICOAG MGMT PT WARFARIN: CPT | Mod: S$GLB,,,

## 2022-12-29 PROCEDURE — 85610 PROTHROMBIN TIME: CPT | Performed by: FAMILY MEDICINE

## 2022-12-29 PROCEDURE — 93793 PR ANTICOAGULANT MGMT FOR PT TAKING WARFARIN: ICD-10-PCS | Mod: S$GLB,,,

## 2023-01-09 ENCOUNTER — TELEPHONE (OUTPATIENT)
Dept: NEUROLOGY | Facility: CLINIC | Age: 33
End: 2023-01-09
Payer: OTHER MISCELLANEOUS

## 2023-01-09 ENCOUNTER — ANTI-COAG VISIT (OUTPATIENT)
Dept: CARDIOLOGY | Facility: CLINIC | Age: 33
End: 2023-01-09
Payer: OTHER MISCELLANEOUS

## 2023-01-09 ENCOUNTER — PATIENT MESSAGE (OUTPATIENT)
Dept: NEUROLOGY | Facility: CLINIC | Age: 33
End: 2023-01-09
Payer: OTHER MISCELLANEOUS

## 2023-01-09 ENCOUNTER — LAB VISIT (OUTPATIENT)
Dept: LAB | Facility: HOSPITAL | Age: 33
End: 2023-01-09
Attending: FAMILY MEDICINE

## 2023-01-09 ENCOUNTER — OFFICE VISIT (OUTPATIENT)
Dept: NEUROLOGY | Facility: CLINIC | Age: 33
End: 2023-01-09

## 2023-01-09 VITALS
HEART RATE: 81 BPM | HEIGHT: 62 IN | DIASTOLIC BLOOD PRESSURE: 80 MMHG | WEIGHT: 184 LBS | BODY MASS INDEX: 33.86 KG/M2 | SYSTOLIC BLOOD PRESSURE: 115 MMHG

## 2023-01-09 DIAGNOSIS — I63.411 EMBOLIC STROKE INVOLVING RIGHT MIDDLE CEREBRAL ARTERY: Primary | ICD-10-CM

## 2023-01-09 DIAGNOSIS — Z79.01 LONG TERM (CURRENT) USE OF ANTICOAGULANTS: ICD-10-CM

## 2023-01-09 DIAGNOSIS — G62.9 PERIPHERAL POLYNEUROPATHY: ICD-10-CM

## 2023-01-09 LAB
INR PPP: 1.2 (ref 0.8–1.2)
PROTHROMBIN TIME: 12 SEC (ref 9–12.5)

## 2023-01-09 PROCEDURE — 99999 PR PBB SHADOW E&M-EST. PATIENT-LVL IV: ICD-10-PCS | Mod: PBBFAC,,, | Performed by: STUDENT IN AN ORGANIZED HEALTH CARE EDUCATION/TRAINING PROGRAM

## 2023-01-09 PROCEDURE — 99999 PR PBB SHADOW E&M-EST. PATIENT-LVL IV: CPT | Mod: PBBFAC,,, | Performed by: STUDENT IN AN ORGANIZED HEALTH CARE EDUCATION/TRAINING PROGRAM

## 2023-01-09 PROCEDURE — 99214 PR OFFICE/OUTPT VISIT, EST, LEVL IV, 30-39 MIN: ICD-10-PCS | Mod: S$PBB,,, | Performed by: PSYCHIATRY & NEUROLOGY

## 2023-01-09 PROCEDURE — 93793 PR ANTICOAGULANT MGMT FOR PT TAKING WARFARIN: ICD-10-PCS | Mod: S$GLB,,,

## 2023-01-09 PROCEDURE — 36415 COLL VENOUS BLD VENIPUNCTURE: CPT | Performed by: FAMILY MEDICINE

## 2023-01-09 PROCEDURE — 85610 PROTHROMBIN TIME: CPT | Performed by: FAMILY MEDICINE

## 2023-01-09 PROCEDURE — 99214 OFFICE O/P EST MOD 30 MIN: CPT | Mod: PBBFAC | Performed by: STUDENT IN AN ORGANIZED HEALTH CARE EDUCATION/TRAINING PROGRAM

## 2023-01-09 PROCEDURE — 99214 OFFICE O/P EST MOD 30 MIN: CPT | Mod: S$PBB,,, | Performed by: PSYCHIATRY & NEUROLOGY

## 2023-01-09 PROCEDURE — 93793 ANTICOAG MGMT PT WARFARIN: CPT | Mod: S$GLB,,,

## 2023-01-09 RX ORDER — VENLAFAXINE HYDROCHLORIDE 37.5 MG/1
37.5 CAPSULE, EXTENDED RELEASE ORAL DAILY
Qty: 30 CAPSULE | Refills: 11 | Status: SHIPPED | OUTPATIENT
Start: 2023-01-09 | End: 2024-01-09

## 2023-01-09 NOTE — PROGRESS NOTES
INR not at goal. Medications, chart, and patient findings reviewed. Patient reports missing her dose on 1/7 and eating kleber greens on Saturday. See calendar for adjustments to dose and follow up plan.

## 2023-01-09 NOTE — PROGRESS NOTES
[facility] - NEUROLOGY [address]TH [place] [facility], [place]    Date: 1/9/23  Patient Name: Dori Gamble   MRN: [id number]   PCP: Feliciano Acosta  Referring Provider: No ref. provider found    Assessment:   Dori Gamble is a 32 y.o. female presenting to clinic as follow up. See HPI and interval history for full detail. The patients stroke etiology remains cryptogenic at this time. She has not yet completed recommended workup with RADHA and cardiac monitoring. Will reorder at this time, as patient currently has insurance coverage for 90 days. Will continue on warfarin at this time with monitoring at coumadin clinic. The patients neuropathic symptoms have improved with lyrica, but she may benefit with addition of another agent. Will start effexor, can increase in future if needed. Discussed potential side effects - for which we can discontinue the medication. Additionally ordered A1C for futher workup of neuropathy. Etiology of her peripheral pain is not well understood at this time. Unlikely to be secondary to stroke currently due to bilateral aspect, and serum workup unrevealing thus far. Described option of EMG/Ncs, however will defer at this time as it would be unlikely to contribute to overall treatment/management at this time.No signs or sxs of DVT which may be contributing, however possible that a small vessel disease in the overall setting of hypercoaguable state may be contributing. With regard to patients subjectively worsening vision, it is likely that it is secondary to recrudesscence given recent stress, drinking, and infection. May consider MRI if patients symptoms worsen, progress or fail to improve.    Pradip Restrepo    Plan:     Continue warfarin at warfarin clinic  Can consider MRI in future if visual symptoms do not improve or worsen  RADHA and Cardiac monitoring ordered, patient told to call/message if does not get call for scheduling in next week  A1C  ordered  Started on Effexor in combination with Lyrica. Discussed possible side effects. May take up to 6 weeks for maximal effect.  Can discontinue in future if with side effect. Can increase in future if needed.  Psych referral ordered, per patients request  Follow up in 4-6 months  Patient to message or call with any questions or concerns, specifically with worsening of vision  She and  voiced understanding of and agreement with plan    Problem List Items Addressed This Visit          Neuro    Embolic stroke involving right middle cerebral artery - Primary    Relevant Orders    Transesophageal echo (RADHA)    Cardiac event monitor    HEMOGLOBIN A1C    Ambulatory consult to Psychiatry     Other Visit Diagnoses       Peripheral polyneuropathy        Relevant Orders    HEMOGLOBIN A1C            Pradip Restrepo MD    Patient note was created using MModal Dictation.  Any errors in syntax or even information may not have been identified and edited on initial review prior to signing this note.  Subjective:   Patient seen in consultation at the request of No ref. provider found for the evaluation of stroke followup. A copy of this note will be sent to the referring physician.     Interval History 1/9/23:  Patient presented to clinic for follow up of prior strokes and associated symptoms. See HPI and prior history for full detail. The patient states that she has had worsening of her R peripheral vision in the past week, stating that she is still able to see from that eye but that her peripheral vision had gotten a little worse. She had recently had COVID, was drinking more during the holidays and was under more stress. She additionally noted that she has continued to have had bilateral lower extremity numbness and parasthesias since the last visit. She was changed from gabapentin to Lyrica by another provider and states that this has improved her neuropathic symptoms in her feet from a 10/10 pain to 6 or 8 out of 10.  "She additionally requested a referral to psychiatry "at her  request". Reports no seizures. Compliant with warfarin, although with fluctuating levels - being monitored by [facility]. She has not yet completed recommended workup with RADHA and cardiac monitoring. Patient currently has insurance coverage for 90 days.     Interval History 9/2/22:  Patient presented to clinic for follow up. The patient had previously been seen folllowing stroke of undetermined etiology (ESUS v thrombotic state). The patient reports that since the last clinic visit, she has had frequent falls. Patient states that she is feeling "off balance". Has not had any associated head trauma.Patient reports that it is worse at night. Patient feels as though she is missing parts of her day. States that she will "forget an hour, or something"  When prompted about the activity that she can not remember the events recalled to her. Still having continued bilateral lower extremity parasthesias. She states that it feels like sandpaper and that her "legs feel heavy".  states that the patient will have frequent slurred speech and being off balance  Patient states that mornings are typically better symptomatically then later in the day. No visual complaints. Endorsed bilateral lower extremity weakness and dropping objects.Since her last visit, the patient had seen hematology who deferred AC decision to neurology given a lack of evidence to support a diagnosis of APS. The patient reports that she still is without insurance. She has been compliant with DAPT but otherwise has never been on AC for a prolonged period of time and has not yet gotten a follow up RADHA (had been previously done at OSH). She also notes that she has been unable to obtain her EMG/NCS from OSH as well.        HPI:   Ms. Dori Gamble is a 32 y.o. female with prior L PCA stroke, repeated symptoms of CVA (starting at age 17) complicated by possible stress-related events, " hypothyroidism, migraines and TBI who is presenting to clinic for follow up after a recent hospitalization. The patient had recently been discharged in 4/2022 after being treated for an acute stroke. At that time, the patient had acute onset symptoms of R MCA syndrome for which she could not get tPA (on AC at the time) but had thrombectomy with TICI 3 reperfusion of a R M2/3 thrombus. Subsequent MRI noted R MCA infarct with microhemmorhage in the right posterior temporal lobe. Further workup while inpatient included a TTE (no significant findings), and TCD (no shunt identified). Patient reportedly had a RADHA at OSH, however records not currently available. No further abnormalities were noted during angio at time of thrombectomy. Eliquis was discontinued during hospitalization as it was unclear what indication the patient had for the medication (had a previously abnormal APA of questionable significance). She was discharged on DAPT and Statin and home health. NIH was 3 at time of discharge due to facial droop, dysarthria and a partial hemianopia. The patient reports having had difficulty with walking since discharge, but notes that it had remained unchanged since TBI in fall of 2021. Additionally she reported some b/l LE parasthesias and bilateral sciatic pain (R>L). She states that it feels as though her legs are heavy and numb and that they will oftentimes have feelings of pins and needles. She reports this pain to be intermittent in nature but states that it is most often present to varying degrees of severity. She additionally notes that it is exacerbated by prolonged activity on her feet but notes that she does still have the abnormal sensation and pain at rest as well. NCS/EMG completed at OSH, however records not currently available. She and  both stated that the patient had also had difficulties with memory since her TBI but note that it was further exacerbated since her stroke. Finally, they also state  that the patient has been increasingly emotional since discharge from the hospital; Patient was tearful in the exam room as well.     NIH in clinic remained 3, stable since discharge. Several labs, ordered during hospitalization, had resulted. As part of her previously ordered hypercoaguable workup, the patients APA had returned abnormal once again, but with lower titers. The patient is currently uninsured and therefore is hesitant to try too much which would cost very much prior to obtaining insurance.      PAST MEDICAL HISTORY:  Past Medical History:   Diagnosis Date    Anticoagulant long-term use     Anxiety     Depression     GERD (gastroesophageal reflux disease)     Hypothyroidism     Migraine headache     Stroke        PAST SURGICAL HISTORY:  Past Surgical History:   Procedure Laterality Date    BRAIN SURGERY       SECTION      HYSTERECTOMY         CURRENT MEDS:  Current Outpatient Medications   Medication Sig Dispense Refill    atenoloL (TENORMIN) 50 MG tablet Take 1 tablet (50 mg total) by mouth once daily. 30 tablet 11    atorvastatin (LIPITOR) 40 MG tablet Take 1 tablet (40 mg total) by mouth once daily. 90 tablet 3    EScitalopram oxalate (LEXAPRO) 20 MG tablet Take 1 tablet (20 mg total) by mouth once daily. 30 tablet 11    levothyroxine (SYNTHROID) 75 MCG tablet Take 1 tablet (75 mcg total) by mouth before breakfast. 30 tablet 11    pregabalin (LYRICA) 200 MG Cap Take 1 capsule (200 mg total) by mouth 3 (three) times daily. 90 capsule 5    traZODone (DESYREL) 100 MG tablet Take 2 tablets (200 mg total) by mouth every evening. 60 tablet 11    warfarin (COUMADIN) 5 MG tablet Take 1 tablet (5 mg total) by mouth Daily. 30 tablet 11    omeprazole (PRILOSEC) 10 MG capsule Take 1 capsule (10 mg total) by mouth once daily. 30 capsule 11    venlafaxine (EFFEXOR-XR) 37.5 MG 24 hr capsule Take 1 capsule (37.5 mg total) by mouth once daily. 30 capsule 11     No current facility-administered medications for  "this visit.       ALLERGIES:  Review of patient's allergies indicates:   Allergen Reactions    Latex, natural rubber Rash    Latex Hives and Rash       FAMILY HISTORY:  Family History   Problem Relation Age of Onset    Depression Mother     Heart disease Mother     Stroke Mother     Heart disease Father     Kidney disease Sister     Depression Sister     Arthritis Maternal Grandmother        SOCIAL HISTORY:  Social History     Tobacco Use    Smoking status: Former     Packs/day: 0.50     Years: 17.00     Pack years: 8.50     Types: Cigarettes     Quit date: 2021     Years since quittin.3    Smokeless tobacco: Never   Substance Use Topics    Alcohol use: Yes     Comment: occ.     Drug use: Not Currently       Review of Systems:  12 system review of systems is negative except for the symptoms mentioned in HPI.      Objective:     Vitals:    23 1254   BP: 115/80   Pulse: 81   Weight: 83.5 kg (184 lb)   Height: 5' 2" (1.575 m)     General: NAD, well nourished   Eyes: no tearing, discharge, no erythema   ENT: moist mucous membranes of the oral cavity, nares patent    Neck: Supple, full range of motion  Cardiovascular: Warm and well perfused, pulses equal and symmetrical  Lungs: Normal work of breathing, normal chest wall excursions  Skin: No rash, lesions, or breakdown on exposed skin  Psychiatry: Increased mood lability and crying.   Abdomen: soft, non tender, non distended  Extremeties: No cyanosis, clubbing or edema.  +Imbalance with falls  +Memory difficulties  +R hand numbness  +R visual peripheral loss  +HA    Neurological   MENTAL STATUS: Alert and oriented to person, place, and time. Attention and concentration within normal limits. Speech without dysarthria, able to name and repeat without difficulty. Recent and remote memory within normal limits   CRANIAL NERVES: Nearly complete R hemianopia. PERRL. EOMI. Facial sensation intact. Mild L facial droop. Hearing grossly intact. Full shoulder shrug " bilaterally. Tongue protrudes midline   SENSORY: Sensation is diminished to light touch and temperature in LUE and LLE compared to right. Bilateral LE numbness and parasthesias to LT and temperature.  MOTOR: Normal bulk and tone. No pronator drift.  5/5 deltoid, biceps, triceps, interosseous, hand  bilaterally. 5/5 iliopsoas, knee extension/flexion, foot dorsi/plantarflexion bilaterally.    REFLEXES: Symmetric and 2+ throughout. Toes down going bilaterally.   CEREBELLAR/COORDINATION/GAIT: Gait slowed and cautious with normal arm swing and decreased stride length. Finger to nose with mild dysmetria. Normal rapid alternating movements.

## 2023-01-12 ENCOUNTER — LAB VISIT (OUTPATIENT)
Dept: LAB | Facility: HOSPITAL | Age: 33
End: 2023-01-12
Attending: FAMILY MEDICINE

## 2023-01-12 ENCOUNTER — ANTI-COAG VISIT (OUTPATIENT)
Dept: CARDIOLOGY | Facility: CLINIC | Age: 33
End: 2023-01-12
Payer: OTHER MISCELLANEOUS

## 2023-01-12 DIAGNOSIS — Z79.01 LONG TERM (CURRENT) USE OF ANTICOAGULANTS: ICD-10-CM

## 2023-01-12 DIAGNOSIS — I63.411 EMBOLIC STROKE INVOLVING RIGHT MIDDLE CEREBRAL ARTERY: Primary | ICD-10-CM

## 2023-01-12 LAB
INR PPP: 1.7 (ref 0.8–1.2)
PROTHROMBIN TIME: 17.4 SEC (ref 9–12.5)

## 2023-01-12 PROCEDURE — 93793 ANTICOAG MGMT PT WARFARIN: CPT | Mod: S$GLB,,,

## 2023-01-12 PROCEDURE — 93793 PR ANTICOAGULANT MGMT FOR PT TAKING WARFARIN: ICD-10-PCS | Mod: S$GLB,,,

## 2023-01-12 PROCEDURE — 36415 COLL VENOUS BLD VENIPUNCTURE: CPT | Performed by: FAMILY MEDICINE

## 2023-01-12 PROCEDURE — 85610 PROTHROMBIN TIME: CPT | Performed by: FAMILY MEDICINE

## 2023-01-12 NOTE — PROGRESS NOTES
INR not at goal. Medications, chart, and patient findings reviewed. Patient reports eating broccoli on Wednesday and had beer on Monday, no other changes noted. See calendar for adjustments to dose and follow up plan.

## 2023-01-13 ENCOUNTER — CLINICAL SUPPORT (OUTPATIENT)
Dept: CARDIOLOGY | Facility: HOSPITAL | Age: 33
End: 2023-01-13
Attending: STUDENT IN AN ORGANIZED HEALTH CARE EDUCATION/TRAINING PROGRAM

## 2023-01-13 DIAGNOSIS — I63.411 EMBOLIC STROKE INVOLVING RIGHT MIDDLE CEREBRAL ARTERY: ICD-10-CM

## 2023-01-13 PROCEDURE — 93270 REMOTE 30 DAY ECG REV/REPORT: CPT

## 2023-01-13 PROCEDURE — 93272 ECG/REVIEW INTERPRET ONLY: CPT | Mod: ,,, | Performed by: INTERNAL MEDICINE

## 2023-01-13 PROCEDURE — 93272 CARDIAC EVENT MONITOR (CUPID ONLY): ICD-10-PCS | Mod: ,,, | Performed by: INTERNAL MEDICINE

## 2023-01-23 ENCOUNTER — ANTI-COAG VISIT (OUTPATIENT)
Dept: CARDIOLOGY | Facility: CLINIC | Age: 33
End: 2023-01-23
Payer: OTHER MISCELLANEOUS

## 2023-01-23 ENCOUNTER — LAB VISIT (OUTPATIENT)
Dept: LAB | Facility: HOSPITAL | Age: 33
End: 2023-01-23
Attending: FAMILY MEDICINE

## 2023-01-23 DIAGNOSIS — I63.411 EMBOLIC STROKE INVOLVING RIGHT MIDDLE CEREBRAL ARTERY: Primary | ICD-10-CM

## 2023-01-23 DIAGNOSIS — Z79.01 LONG TERM (CURRENT) USE OF ANTICOAGULANTS: ICD-10-CM

## 2023-01-23 LAB
INR PPP: 1.5 (ref 0.8–1.2)
PROTHROMBIN TIME: 15.8 SEC (ref 9–12.5)

## 2023-01-23 PROCEDURE — 93793 ANTICOAG MGMT PT WARFARIN: CPT | Mod: S$GLB,,,

## 2023-01-23 PROCEDURE — 93793 PR ANTICOAGULANT MGMT FOR PT TAKING WARFARIN: ICD-10-PCS | Mod: S$GLB,,,

## 2023-01-23 PROCEDURE — 36415 COLL VENOUS BLD VENIPUNCTURE: CPT | Performed by: FAMILY MEDICINE

## 2023-01-23 PROCEDURE — 85610 PROTHROMBIN TIME: CPT | Performed by: FAMILY MEDICINE

## 2023-01-23 NOTE — PROGRESS NOTES
INR not at goal. Medications, chart, and patient findings reviewed. Patient reports resuming her green veggies this past week but no other changes. See calendar for adjustments to dose and follow up plan.

## 2023-02-14 NOTE — PROGRESS NOTES
Patient and her  have moved to [place], [place]. Will need a new PCP established soon as her responsible provider here will not want to continue care if patient is no longer being seen at [facility]. Once patient establishes care with PCP in [place], she will need to be discharged from our clinic as we can not service providers that are not associated with [facility].

## 2023-02-14 NOTE — PROGRESS NOTES
Patient states she has moved to [place], [place] recently. In the process of possibly finding a PCP or an outpatient lab to reschedule INR. Advised patient to contact  asap with any updates.

## 2023-02-28 NOTE — PROGRESS NOTES
Called patient regarding lab, he states he currently waiting for insurance to transfer over on 3/1. Transplant coordinator Siomara Corey located at [facility] with Dr. Lawrence Santos will monitor PT/INR; also will obtain labs at local [facility] as well.

## 2023-02-28 NOTE — PROGRESS NOTES
Patient will be receiving care from an outside hospital and will no longer require the services of the [facility]. Will discharge from clinic at this time.

## 2023-03-28 ENCOUNTER — PATIENT MESSAGE (OUTPATIENT)
Dept: FAMILY MEDICINE | Facility: CLINIC | Age: 33
End: 2023-03-28
Payer: OTHER MISCELLANEOUS

## 2024-10-01 NOTE — PROGRESS NOTES
INR not at goal. Medications, chart, and patient findings reviewed. See calendar for adjustments to dose and follow up plan.    
N/A